# Patient Record
Sex: MALE | Race: WHITE
[De-identification: names, ages, dates, MRNs, and addresses within clinical notes are randomized per-mention and may not be internally consistent; named-entity substitution may affect disease eponyms.]

---

## 2018-04-04 ENCOUNTER — HOSPITAL ENCOUNTER (OUTPATIENT)
Dept: HOSPITAL 89 - RAD | Age: 68
End: 2018-04-04
Attending: FAMILY MEDICINE
Payer: COMMERCIAL

## 2018-04-04 DIAGNOSIS — M47.896: Primary | ICD-10-CM

## 2018-04-04 PROCEDURE — 72100 X-RAY EXAM L-S SPINE 2/3 VWS: CPT

## 2018-04-04 NOTE — RADIOLOGY IMAGING REPORT
FACILITY: [facility] - [place] 

 

PATIENT NAME: Darryl Gannon

: 1950

MR: [id number]

V: [id number]

EXAM DATE: 

ORDERING PHYSICIAN: ISRRAEL DONG

TECHNOLOGIST: 

 

Location: [facility] - [place]

Patient: Darryl Gannon

: 1950

MRN: [id number]

Visit/Account:[id number]

Date of Sevice:  2018

 

ACCESSION #: [id number]

 

Exam type: LUMBAR SPINE 2 OR 3 VIEW

 

History: Low back pain, no known injury

 

Comparison: None.

 

Findings:

 

There are five nonrib-bearing lumbar-type vertebral bodies present.  There is no evidence of acute fr
actures or subluxations within the lumbar spine.  Small anterior osteophytes are seen throughout the 
lumbar spine.  There is suggestion of mild disc space narrowing L1-2 and T12-L1 and L5-S1.  Mild vasc
ular calcination occasions are present in the abdominal aorta.

 

IMPRESSION:

 

1.  Mild spondylotic changes lumbar spine as described.  If patient's symptoms persist MR is recommen
ded

 

Report Dictated By: Dottie Hanna MD at 2018 2:21 PM

 

Report E-Signed By: Dottie Hanna MD  at 2018 2:24 PM

 

WSN:[email or web address]

## 2018-05-01 ENCOUNTER — HOSPITAL ENCOUNTER (OUTPATIENT)
Dept: HOSPITAL 89 - CT | Age: 68
LOS: 2 days | Discharge: HOME | End: 2018-05-03
Attending: UROLOGY
Payer: COMMERCIAL

## 2018-05-01 DIAGNOSIS — M54.5: ICD-10-CM

## 2018-05-01 DIAGNOSIS — R31.0: ICD-10-CM

## 2018-05-01 DIAGNOSIS — N20.0: Primary | ICD-10-CM

## 2018-05-01 DIAGNOSIS — N28.1: ICD-10-CM

## 2018-05-01 LAB — PLATELET COUNT, AUTOMATED: 204 K/UL (ref 150–450)

## 2018-05-01 PROCEDURE — 82247 BILIRUBIN TOTAL: CPT

## 2018-05-01 PROCEDURE — 82248 BILIRUBIN DIRECT: CPT

## 2018-05-01 PROCEDURE — 82947 ASSAY GLUCOSE BLOOD QUANT: CPT

## 2018-05-01 PROCEDURE — 84520 ASSAY OF UREA NITROGEN: CPT

## 2018-05-01 PROCEDURE — 84155 ASSAY OF PROTEIN SERUM: CPT

## 2018-05-01 PROCEDURE — 82565 ASSAY OF CREATININE: CPT

## 2018-05-01 PROCEDURE — 85025 COMPLETE CBC W/AUTO DIFF WBC: CPT

## 2018-05-01 PROCEDURE — 84075 ASSAY ALKALINE PHOSPHATASE: CPT

## 2018-05-01 PROCEDURE — 74178 CT ABD&PLV WO CNTR FLWD CNTR: CPT

## 2018-05-01 PROCEDURE — 82040 ASSAY OF SERUM ALBUMIN: CPT

## 2018-05-01 PROCEDURE — 87088 URINE BACTERIA CULTURE: CPT

## 2018-05-01 PROCEDURE — 84295 ASSAY OF SERUM SODIUM: CPT

## 2018-05-01 PROCEDURE — 81001 URINALYSIS AUTO W/SCOPE: CPT

## 2018-05-01 PROCEDURE — 36415 COLL VENOUS BLD VENIPUNCTURE: CPT

## 2018-05-01 PROCEDURE — 84460 ALANINE AMINO (ALT) (SGPT): CPT

## 2018-05-01 PROCEDURE — 84132 ASSAY OF SERUM POTASSIUM: CPT

## 2018-05-01 PROCEDURE — 82374 ASSAY BLOOD CARBON DIOXIDE: CPT

## 2018-05-01 PROCEDURE — 84450 TRANSFERASE (AST) (SGOT): CPT

## 2018-05-01 PROCEDURE — 82435 ASSAY OF BLOOD CHLORIDE: CPT

## 2018-05-01 PROCEDURE — 82310 ASSAY OF CALCIUM: CPT

## 2018-08-06 ENCOUNTER — HOSPITAL ENCOUNTER (EMERGENCY)
Dept: HOSPITAL 89 - ER | Age: 68
Discharge: HOME | End: 2018-08-06
Payer: COMMERCIAL

## 2018-08-06 VITALS — SYSTOLIC BLOOD PRESSURE: 131 MMHG | DIASTOLIC BLOOD PRESSURE: 77 MMHG

## 2018-08-06 DIAGNOSIS — I25.2: ICD-10-CM

## 2018-08-06 DIAGNOSIS — J44.9: ICD-10-CM

## 2018-08-06 DIAGNOSIS — M16.12: ICD-10-CM

## 2018-08-06 DIAGNOSIS — R11.2: Primary | ICD-10-CM

## 2018-08-06 DIAGNOSIS — I10: ICD-10-CM

## 2018-08-06 DIAGNOSIS — F17.210: ICD-10-CM

## 2018-08-06 DIAGNOSIS — Z79.82: ICD-10-CM

## 2018-08-06 DIAGNOSIS — Z79.899: ICD-10-CM

## 2018-08-06 LAB — PLATELET COUNT, AUTOMATED: 234 K/UL (ref 150–450)

## 2018-08-06 PROCEDURE — 84075 ASSAY ALKALINE PHOSPHATASE: CPT

## 2018-08-06 PROCEDURE — 84155 ASSAY OF PROTEIN SERUM: CPT

## 2018-08-06 PROCEDURE — 82247 BILIRUBIN TOTAL: CPT

## 2018-08-06 PROCEDURE — 96375 TX/PRO/DX INJ NEW DRUG ADDON: CPT

## 2018-08-06 PROCEDURE — 84132 ASSAY OF SERUM POTASSIUM: CPT

## 2018-08-06 PROCEDURE — 84520 ASSAY OF UREA NITROGEN: CPT

## 2018-08-06 PROCEDURE — 96374 THER/PROPH/DIAG INJ IV PUSH: CPT

## 2018-08-06 PROCEDURE — 96376 TX/PRO/DX INJ SAME DRUG ADON: CPT

## 2018-08-06 PROCEDURE — 82435 ASSAY OF BLOOD CHLORIDE: CPT

## 2018-08-06 PROCEDURE — 82947 ASSAY GLUCOSE BLOOD QUANT: CPT

## 2018-08-06 PROCEDURE — 82040 ASSAY OF SERUM ALBUMIN: CPT

## 2018-08-06 PROCEDURE — 82310 ASSAY OF CALCIUM: CPT

## 2018-08-06 PROCEDURE — 82374 ASSAY BLOOD CARBON DIOXIDE: CPT

## 2018-08-06 PROCEDURE — 99284 EMERGENCY DEPT VISIT MOD MDM: CPT

## 2018-08-06 PROCEDURE — 84295 ASSAY OF SERUM SODIUM: CPT

## 2018-08-06 PROCEDURE — 81001 URINALYSIS AUTO W/SCOPE: CPT

## 2018-08-06 PROCEDURE — 84460 ALANINE AMINO (ALT) (SGPT): CPT

## 2018-08-06 PROCEDURE — 84450 TRANSFERASE (AST) (SGOT): CPT

## 2018-08-06 PROCEDURE — 82150 ASSAY OF AMYLASE: CPT

## 2018-08-06 PROCEDURE — 85025 COMPLETE CBC W/AUTO DIFF WBC: CPT

## 2018-08-06 PROCEDURE — 82565 ASSAY OF CREATININE: CPT

## 2018-08-06 NOTE — ER REPORT
History and Physical


Time Seen By MD:  14:46


Hx. of Stated Complaint:  


VOMITTING FOR 6 DAYS.  NOTHING TO EAT FOR 4 DAYS


HPI/ROS


CHIEF COMPLAINT: Nausea and vomiting





HISTORY OF PRESENT ILLNESS: This is a 67-year-old male who presents to the 

emergency department for nausea and vomiting. Patient states that over the last 

6 days he's had no acute onset of nausea and vomiting, unable to keep anything 

down not able to eat over the last 4 days. Patient states he is scheduled to 

have left hip replacement in about 5 weeks. Patient states he is being very 

careful with his medications avoiding medications such as NSAIDs to prevent a 

delay in the surgery. Patient states his primary care provider gave him some 

tramadol for the hip pain. Patient states he is also very opposed to narcotic 

pain medications. Patient denies fevers, chest pain, shortness of breath. He is 

agreeable to a small dose of morphine and the emergency department for his left 

hip pain.





REVIEW OF SYSTEMS:


Respiratory: No cough, no dyspnea.


Cardiovascular: No chest pain, no palpitations.


Gastrointestinal: As above.


Musculoskeletal: As above.


Allergies:  


Coded Allergies:  


     No Known Drug Allergies (Verified , 11/2/17)


Home Meds


Active Scripts


Ondansetron (ZOFRAN ODT) 4 Mg Tab.rapdis, 4 MG PO Q6H Y for NAUSEA/VOMITING, #

20 TAB.SOL 0 Refills


   Prov:AMELIA MURRAY [facility]         8/6/18


Hydrocodone Bit/Acetaminophen (HYDROCODON-ACETAMINOPHEN 5-325) 1 Each Tablet, 1 

EACH PO Q4-6H Y for PAIN, #8 TAB 0 Refills


   Take 1/2 a tab every 6 hours as needed for pain.


   Prov:AMELIA MURRAY [facility]         8/6/18


Reported Medications


Atorvastatin Calcium (LIPITOR) 40 Mg Tablet, 1 TAB PO QHS, TAB


   8/13/17


Aspirin (ASPIR 81) 81 Mg Tablet.dr, 81 MG PO QDAY, TAB


   8/13/17


Amlodipine Besylate (AMLODIPINE BESYLATE) 10 Mg Tablet, 1 TAB PO QDAY, TAB


   8/13/17


Enalapril Maleate (ENALAPRIL MALEATE) 20 Mg Tablet, 20 MG PO QDAY


   6/18/17


Discontinued Reported Medications


Nitroglycerin (NITROGLYCERIN) 0.4 Mg Tab.subl, 0.4 MG SL Q5MIN Y for PAIN


   8/13/17


Albuterol Sulfate (PROVENTIL HFA) 6.7 Gm Inh, 2 PUFF INH Q6H, INH


   8/13/17


Past Medical/Surgical History


The patient has a past medical surgical history of arthritis, hypertension, 

heart attack, COPD, chronic stomachache, celiac disease, lumbar back pain, back 

pain.


Reviewed Nurses Notes:  Yes


Hx Smoking:  Yes (1/2 PPD)


Smoking Status:  Current: Every Day Smoker


Constitutional





Vital Sign - Last 24 Hours








 8/6/18





 14:38


 


Temp 98.1


 


Pulse 78


 


Resp 16


 


B/P (MAP) 134/101


 


Pulse Ox 94


 


O2 Delivery Room Air








Physical Exam


  General Appearance: The patient is alert, has no immediate need for airway 

protection and no current signs of toxicity, cachectic appearing.


Eyes: Pupils equal and round no injection.


Respiratory: Chest is non tender, lungs are clear to auscultation.


Cardiac: regular rate and rhythm, no murmurs, clicks or rubs.


Gastrointestinal: Abdomen is soft mild discomfort to the epigastrium with 

palpation. No masses, bowel sounds normal.


Musculoskeletal:  Neck: Neck is supple and non tender.


   Extremities have full range of motion and are non tender.


Skin: No rashes or lesions.





DIFFERENTIAL DIAGNOSIS: After history and physical exam differential diagnosis 

was considered for nausea and vomiting including but not limited to 

gastroenteritis, gastritis, appendicitis, and medication side effect.





Medical Decision Making


Data Points


Result Diagram:  


8/6/18 1449                                                                    

            8/6/18 1449





Laboratory





Hematology








Test


  8/6/18


14:49 8/6/18


16:26


 


Red Blood Count


  4.21 M/uL


(4.00-5.60) 


 


 


Mean Corpuscular Volume


  101.4 fL


(80.0-96.0) 


 


 


Mean Corpuscular Hemoglobin


  36.0 pg


(26.0-33.0) 


 


 


Mean Corpuscular Hemoglobin


Concent 35.5 g/dL


(32.0-36.0) 


 


 


Red Cell Distribution Width


  13.2 %


(11.5-14.5) 


 


 


Mean Platelet Volume


  7.2 fL


(7.2-11.1) 


 


 


Neutrophils (%) (Auto)


  78.9 %


(39.4-72.5) 


 


 


Lymphocytes (%) (Auto)


  11.9 %


(17.6-49.6) 


 


 


Monocytes (%) (Auto)


  7.7 %


(4.1-12.4) 


 


 


Eosinophils (%) (Auto)


  0.1 %


(0.4-6.7) 


 


 


Basophils (%) (Auto)


  1.4 %


(0.3-1.4) 


 


 


Nucleated RBC Relative Count


(auto) 0.0 /100WBC 


  


 


 


Neutrophils # (Auto)


  7.6 K/uL


(2.0-7.4) 


 


 


Lymphocytes # (Auto)


  1.1 K/uL


(1.3-3.6) 


 


 


Monocytes # (Auto)


  0.7 K/uL


(0.3-1.0) 


 


 


Eosinophils # (Auto)


  0.0 K/uL


(0.0-0.5) 


 


 


Basophils # (Auto)


  0.1 K/uL


(0.0-0.1) 


 


 


Nucleated RBC Absolute Count


(auto) 0.00 K/uL 


  


 


 


Peripheral Blood Smear No Y/N  


 


Sodium Level


  140 mmol/L


(137-145) 


 


 


Potassium Level


  4.0 mmol/L


(3.5-5.0) 


 


 


Chloride Level


  105 mmol/L


() 


 


 


Carbon Dioxide Level


  20 mmol/L


(22-30) 


 


 


Blood Urea Nitrogen


  25 mg/dl


(9-21) 


 


 


Creatinine


  1.30 mg/dl


(0.66-1.25) 


 


 


Glomerular Filtration Rate


Calc 54.9 


  


 


 


Random Glucose


  96 mg/dl


() 


 


 


Calcium Level


  9.0 mg/dl


(8.4-10.2) 


 


 


Total Bilirubin


  0.8 mg/dl


(0.2-1.3) 


 


 


Aspartate Amino Transf


(AST/SGOT) 23 U/L (0-35) 


  


 


 


Alanine Aminotransferase


(ALT/SGPT) 15 U/L (0-56) 


  


 


 


Alkaline Phosphatase 64 U/L (0-126)  


 


Total Protein


  6.4 g/dl


(6.3-8.2) 


 


 


Albumin


  4.0 g/dl


(3.5-5.0) 


 


 


Amylase Level 65 U/L (0-110)  


 


Urine Color  Yellow 


 


Urine Clarity  Clear 


 


Urine pH


  


  5.0 pH


(4.8-9.5)


 


Urine Specific Gravity  1.023 


 


Urine Protein


  


  30 mg/dL


(NEGATIVE)


 


Urine Glucose (UA)


  


  Negative mg/dL


(NEGATIVE)


 


Urine Ketones


  


  80 mg/dL


(NEGATIVE)


 


Urine Blood


  


  Negative


(NEGATIVE)


 


Urine Nitrite


  


  Negative


(NEGATIVE)


 


Urine Bilirubin


  


  Negative


(NEGATIVE)


 


Urine Urobilinogen


  


  2.0 mg/dL


(0.2-1.9)


 


Urine Leukocyte Esterase


  


  Negative


(NEGATIVE)


 


Urine RBC


  


  1 /HPF


(0-2/HPF)


 


Urine WBC


  


  1 /HPF


(0-5/HPF)


 


Urine Squamous Epithelial


Cells 


  None /LPF


(</=FEW)


 


Urine Bacteria


  


  Negative /HPF


(NONE-FEW)


 


Urine Mucus


  


  Few /HPF


(NONE-FEW)








Chemistry








Test


  8/6/18


14:49 8/6/18


16:26


 


White Blood Count


  9.6 k/uL


(4.5-11.0) 


 


 


Red Blood Count


  4.21 M/uL


(4.00-5.60) 


 


 


Hemoglobin


  15.1 g/dL


(14.0-18.0) 


 


 


Hematocrit


  42.7 %


(42.0-52.0) 


 


 


Mean Corpuscular Volume


  101.4 fL


(80.0-96.0) 


 


 


Mean Corpuscular Hemoglobin


  36.0 pg


(26.0-33.0) 


 


 


Mean Corpuscular Hemoglobin


Concent 35.5 g/dL


(32.0-36.0) 


 


 


Red Cell Distribution Width


  13.2 %


(11.5-14.5) 


 


 


Platelet Count


  234 K/uL


(150-450) 


 


 


Mean Platelet Volume


  7.2 fL


(7.2-11.1) 


 


 


Neutrophils (%) (Auto)


  78.9 %


(39.4-72.5) 


 


 


Lymphocytes (%) (Auto)


  11.9 %


(17.6-49.6) 


 


 


Monocytes (%) (Auto)


  7.7 %


(4.1-12.4) 


 


 


Eosinophils (%) (Auto)


  0.1 %


(0.4-6.7) 


 


 


Basophils (%) (Auto)


  1.4 %


(0.3-1.4) 


 


 


Nucleated RBC Relative Count


(auto) 0.0 /100WBC 


  


 


 


Neutrophils # (Auto)


  7.6 K/uL


(2.0-7.4) 


 


 


Lymphocytes # (Auto)


  1.1 K/uL


(1.3-3.6) 


 


 


Monocytes # (Auto)


  0.7 K/uL


(0.3-1.0) 


 


 


Eosinophils # (Auto)


  0.0 K/uL


(0.0-0.5) 


 


 


Basophils # (Auto)


  0.1 K/uL


(0.0-0.1) 


 


 


Nucleated RBC Absolute Count


(auto) 0.00 K/uL 


  


 


 


Peripheral Blood Smear No Y/N  


 


Glomerular Filtration Rate


Calc 54.9 


  


 


 


Calcium Level


  9.0 mg/dl


(8.4-10.2) 


 


 


Total Bilirubin


  0.8 mg/dl


(0.2-1.3) 


 


 


Aspartate Amino Transf


(AST/SGOT) 23 U/L (0-35) 


  


 


 


Alanine Aminotransferase


(ALT/SGPT) 15 U/L (0-56) 


  


 


 


Alkaline Phosphatase 64 U/L (0-126)  


 


Total Protein


  6.4 g/dl


(6.3-8.2) 


 


 


Albumin


  4.0 g/dl


(3.5-5.0) 


 


 


Amylase Level 65 U/L (0-110)  


 


Urine Color  Yellow 


 


Urine Clarity  Clear 


 


Urine pH


  


  5.0 pH


(4.8-9.5)


 


Urine Specific Gravity  1.023 


 


Urine Protein


  


  30 mg/dL


(NEGATIVE)


 


Urine Glucose (UA)


  


  Negative mg/dL


(NEGATIVE)


 


Urine Ketones


  


  80 mg/dL


(NEGATIVE)


 


Urine Blood


  


  Negative


(NEGATIVE)


 


Urine Nitrite


  


  Negative


(NEGATIVE)


 


Urine Bilirubin


  


  Negative


(NEGATIVE)


 


Urine Urobilinogen


  


  2.0 mg/dL


(0.2-1.9)


 


Urine Leukocyte Esterase


  


  Negative


(NEGATIVE)


 


Urine RBC


  


  1 /HPF


(0-2/HPF)


 


Urine WBC


  


  1 /HPF


(0-5/HPF)


 


Urine Squamous Epithelial


Cells 


  None /LPF


(</=FEW)


 


Urine Bacteria


  


  Negative /HPF


(NONE-FEW)


 


Urine Mucus


  


  Few /HPF


(NONE-FEW)








Urinalysis








Test


  8/6/18


16:26


 


Urine Color Yellow 


 


Urine Clarity Clear 


 


Urine pH


  5.0 pH


(4.8-9.5)


 


Urine Specific Gravity 1.023 


 


Urine Protein


  30 mg/dL


(NEGATIVE)


 


Urine Glucose (UA)


  Negative mg/dL


(NEGATIVE)


 


Urine Ketones


  80 mg/dL


(NEGATIVE)


 


Urine Blood


  Negative


(NEGATIVE)


 


Urine Nitrite


  Negative


(NEGATIVE)


 


Urine Bilirubin


  Negative


(NEGATIVE)


 


Urine Urobilinogen


  2.0 mg/dL


(0.2-1.9)


 


Urine Leukocyte Esterase


  Negative


(NEGATIVE)


 


Urine RBC


  1 /HPF


(0-2/HPF)


 


Urine WBC


  1 /HPF


(0-5/HPF)


 


Urine Squamous Epithelial


Cells None /LPF


(</=FEW)


 


Urine Bacteria


  Negative /HPF


(NONE-FEW)


 


Urine Mucus


  Few /HPF


(NONE-FEW)











ED Course/Re-evaluation


Clinical Indication for ER IV:  Hydration, IV Access


ED Course


The patient was admitted to room. A history and physical were obtained. 

Differential diagnoses were considered. IV was started. CBC, CMP and UA were 

obtained. .4, MCH 36.0, I did compare these to the previous studies and 

this is within his normal limits. Chemistry showing BUN 25, creatinine 1.30 

this is similar to previous studies. UA showing ketones and concentrated urine 

otherwise unremarkable. Patient was given a 1 L normal saline bolus. 4 mg IV 

Zofran, 4 mg IV morphine for his hip pain. Patient states he is feeling better 

however still having a little nausea, did repeat a 4 mg IV Zofran, he also 

received 20 mg IV and famotidine. Patient states he is feeling much better and 

would like to go home. I did send a prescription in for her Zofran and 

hydrocodone. I did tell the patient that he can go ahead and take the 

hydrocodone as needed for his discomfort till he follows up with his surgeon. 

The patient expressed understanding and is willing to do this as long as it 

doesn't delay his surgery. There were no other questions or concerns at this 

time and the patient was discharged home. Patient was feeling significantly 

better at the time of discharge.


Decision to Disposition Date:  Aug 6, 2018


Decision to Disposition Time:  16:42





Depart


Departure


Latest Vital Signs





Vital Signs








  Date Time  Temp Pulse Resp B/P (MAP) Pulse Ox O2 Delivery O2 Flow Rate FiO2


 


8/6/18 14:38 98.1 78 16 134/101 94 Room Air  








Impression:  


 Primary Impression:  


 Nausea & vomiting


 Additional Impression:  


 Hip arthritis


Condition:  Improved


Disposition:  HOME OR SELF-CARE


Referrals:  


ISRRAEL DONG MD (PCP)


2 Days


New Scripts


Ondansetron (ZOFRAN ODT) 4 Mg Tab.rapdis


4 MG PO Q6H Y for NAUSEA/VOMITING, #20 TAB.SOL 0 Refills


   Prov: AMELIA MURRAY [facility]         8/6/18 


Hydrocodone Bit/Acetaminophen (HYDROCODON-ACETAMINOPHEN 5-325) 1 Each Tablet


1 EACH PO Q4-6H Y for PAIN, #8 TAB 0 Refills


   Take 1/2 a tab every 6 hours as needed for pain.


   Prov: AMELIA MURRAY [facility]         8/6/18


Patient Instructions:  Acute Nausea and Vomiting (ED), Clear Liquid Diet (ED), 

Narcotic Pain Management (ED)





Additional Instructions:  


Try the Zofran for nausea and vomiting.


Clear liquid diet for the next 12 hours then slowly progress into a regular 

diet.


I did sent a prescription for hydrocodone to your pharmacy which you can use 

for severe pain until you have your surgery.


Take Tylenol as needed if not using hydrocodone, do not take both together.


Follow up with your primary care provider in 2-4 days if no improvement.


Return to the ED for any other concerns or worsening symptoms.





Problem Qualifiers








 Primary Impression:  


 Nausea & vomiting


 Vomiting type:  unspecified  Vomiting Intractability:  non-intractable  

Qualified Codes:  R11.2 - Nausea with vomiting, unspecified








AMELIA MURRAY [facility] Aug 6, 2018 14:50

## 2018-08-30 RX ORDER — TRAZODONE HYDROCHLORIDE 100 MG/1
100 TABLET ORAL NIGHTLY
COMMUNITY
End: 2019-05-16

## 2018-08-30 RX ORDER — ALPRAZOLAM 1 MG/1
1 TABLET ORAL 3 TIMES DAILY PRN
COMMUNITY

## 2018-08-30 RX ORDER — BUDESONIDE AND FORMOTEROL FUMARATE DIHYDRATE 160; 4.5 UG/1; UG/1
2 AEROSOL RESPIRATORY (INHALATION) DAILY
COMMUNITY

## 2018-09-05 ENCOUNTER — APPOINTMENT (OUTPATIENT)
Dept: LAB | Facility: HOSPITAL | Age: 68
End: 2018-09-05

## 2018-09-05 ENCOUNTER — OFFICE VISIT (OUTPATIENT)
Dept: GASTROENTEROLOGY | Facility: CLINIC | Age: 68
End: 2018-09-05

## 2018-09-05 VITALS
TEMPERATURE: 97 F | WEIGHT: 168 LBS | DIASTOLIC BLOOD PRESSURE: 80 MMHG | SYSTOLIC BLOOD PRESSURE: 124 MMHG | HEIGHT: 70 IN | HEART RATE: 100 BPM | BODY MASS INDEX: 24.05 KG/M2 | OXYGEN SATURATION: 96 %

## 2018-09-05 DIAGNOSIS — K74.60 CIRRHOSIS OF LIVER WITHOUT ASCITES, UNSPECIFIED HEPATIC CIRRHOSIS TYPE (HCC): Primary | ICD-10-CM

## 2018-09-05 LAB
ALBUMIN SERPL-MCNC: 4.5 G/DL (ref 3.5–5)
ALBUMIN/GLOB SERPL: 1.1 G/DL (ref 1.1–2.5)
ALP SERPL-CCNC: 257 U/L (ref 24–120)
ALT SERPL W P-5'-P-CCNC: 39 U/L (ref 0–54)
ANION GAP SERPL CALCULATED.3IONS-SCNC: 11 MMOL/L (ref 4–13)
AST SERPL-CCNC: 73 U/L (ref 7–45)
BILIRUB SERPL-MCNC: 1 MG/DL (ref 0.1–1)
BUN BLD-MCNC: 5 MG/DL (ref 5–21)
BUN/CREAT SERPL: 7.9 (ref 7–25)
CALCIUM SPEC-SCNC: 9.8 MG/DL (ref 8.4–10.4)
CHLORIDE SERPL-SCNC: 99 MMOL/L (ref 98–110)
CO2 SERPL-SCNC: 31 MMOL/L (ref 24–31)
CREAT BLD-MCNC: 0.63 MG/DL (ref 0.5–1.4)
DEPRECATED RDW RBC AUTO: 49.7 FL (ref 40–54)
ERYTHROCYTE [DISTWIDTH] IN BLOOD BY AUTOMATED COUNT: 12.9 % (ref 12–15)
GFR SERPL CREATININE-BSD FRML MDRD: 126 ML/MIN/1.73
GLOBULIN UR ELPH-MCNC: 4 GM/DL
GLUCOSE BLD-MCNC: 140 MG/DL (ref 70–100)
HCT VFR BLD AUTO: 45.8 % (ref 40–52)
HGB BLD-MCNC: 15.9 G/DL (ref 14–18)
INR PPP: 1.02 (ref 0.91–1.09)
MCH RBC QN AUTO: 36 PG (ref 28–32)
MCHC RBC AUTO-ENTMCNC: 34.7 G/DL (ref 33–36)
MCV RBC AUTO: 103.6 FL (ref 82–95)
PLATELET # BLD AUTO: 112 10*3/MM3 (ref 130–400)
PMV BLD AUTO: 10.4 FL (ref 6–12)
POTASSIUM BLD-SCNC: 4.2 MMOL/L (ref 3.5–5.3)
PROT SERPL-MCNC: 8.5 G/DL (ref 6.3–8.7)
PROTHROMBIN TIME: 13.7 SECONDS (ref 11.9–14.6)
RBC # BLD AUTO: 4.42 10*6/MM3 (ref 4.8–5.9)
SODIUM BLD-SCNC: 141 MMOL/L (ref 135–145)
WBC NRBC COR # BLD: 11.98 10*3/MM3 (ref 4.8–10.8)

## 2018-09-05 PROCEDURE — 82105 ALPHA-FETOPROTEIN SERUM: CPT | Performed by: NURSE PRACTITIONER

## 2018-09-05 PROCEDURE — 80053 COMPREHEN METABOLIC PANEL: CPT | Performed by: NURSE PRACTITIONER

## 2018-09-05 PROCEDURE — 85610 PROTHROMBIN TIME: CPT | Performed by: NURSE PRACTITIONER

## 2018-09-05 PROCEDURE — 99204 OFFICE O/P NEW MOD 45 MIN: CPT | Performed by: NURSE PRACTITIONER

## 2018-09-05 PROCEDURE — 36415 COLL VENOUS BLD VENIPUNCTURE: CPT | Performed by: NURSE PRACTITIONER

## 2018-09-05 PROCEDURE — 85027 COMPLETE CBC AUTOMATED: CPT | Performed by: NURSE PRACTITIONER

## 2018-09-05 NOTE — PROGRESS NOTES
Chief Complaint   Patient presents with   • Liver Eval     had ct showed spot on liver        Subjective     HPI    Diagnosed with cirrhosis during hospitalization at [facility] Aug 2018.  Pt treated for bladder cancer (found on CT, eval for hematuria).  Repeat CT at [facility] showed  suspicious bowel perforation, cirrhotic liver with 2.1 cm lesion to right hepatic lobe.  He has a significant hx of etoh consumption.  Currently consumes 6-8 beer per day and has drank larger amounts in the past.  Denies heartburn/indigestion/dysphagia.  No abdominal pain.  No changes in bowels.  No BRBPR or melena.  Weight is stable.  No tattoo, no hx of blood transfusion, mother had liver disease.    No previous cscope/egd    Past Medical History:   Diagnosis Date   • Anxiety    • COPD (chronic obstructive pulmonary disease) (CMS/HCC)        History reviewed. No pertinent surgical history.    Outpatient Prescriptions Marked as Taking for the 9/5/18 encounter (Office Visit) with Jarrod Cherry APRN   Medication Sig Dispense Refill   • ALPRAZolam (XANAX) 1 MG tablet Take 1 mg by mouth 2 (Two) Times a Day As Needed for Anxiety.     • budesonide-formoterol (SYMBICORT) 160-4.5 MCG/ACT inhaler Inhale 2 puffs 2 (Two) Times a Day.     • traZODone (DESYREL) 100 MG tablet Take 100 mg by mouth Every Night.         Allergies   Allergen Reactions   • Penicillins Other (See Comments)     unsure       Social History     Social History   • Marital status:      Spouse name: N/A   • Number of children: N/A   • Years of education: N/A     Occupational History   • Not on file.     Social History Main Topics   • Smoking status: Current Every Day Smoker     Packs/day: 1.00     Years: 40.00   • Smokeless tobacco: Never Used   • Alcohol use Yes      Comment: daily    • Drug use: No   • Sexual activity: Not on file     Other Topics Concern   • Not on file     Social History Narrative   • No narrative on file       Family History   Problem  "Relation Age of Onset   • Colon cancer Father        Review of Systems   Constitutional: Negative for fatigue, fever and unexpected weight change.   HENT: Negative for hearing loss, sore throat and voice change.    Eyes: Negative for visual disturbance.   Respiratory: Negative for cough, shortness of breath and wheezing.    Cardiovascular: Negative for chest pain and palpitations.   Gastrointestinal: Negative for abdominal pain, blood in stool and vomiting.   Endocrine: Negative for polydipsia and polyuria.   Genitourinary: Negative for difficulty urinating, dysuria, hematuria and urgency.   Musculoskeletal: Negative for joint swelling and myalgias.   Skin: Negative for color change, rash and wound.   Neurological: Negative for dizziness, tremors, seizures and syncope.   Hematological: Does not bruise/bleed easily.   Psychiatric/Behavioral: Negative for agitation and confusion. The patient is not nervous/anxious.        Objective     Vitals:    09/05/18 1028   BP: 124/80   Pulse: 100   Temp: 97 °F (36.1 °C)   SpO2: 96%   Weight: 76.2 kg (168 lb)   Height: 177.8 cm (70\")     Body mass index is 24.11 kg/m².    Physical Exam   Constitutional: He is oriented to person, place, and time. He appears well-developed and well-nourished. He is cooperative.   HENT:   Head: Normocephalic and atraumatic.   Eyes: Pupils are equal, round, and reactive to light. Conjunctivae are normal. No scleral icterus.   Neck: Normal range of motion. Neck supple. No JVD present. No thyroid mass and no thyromegaly present.   Cardiovascular: Normal rate, regular rhythm and normal heart sounds.  Exam reveals no gallop and no friction rub.    No murmur heard.  Pulmonary/Chest: Effort normal and breath sounds normal. No accessory muscle usage. No respiratory distress. He has no wheezes. He has no rales.   Abdominal: Soft. Normal appearance and bowel sounds are normal. He exhibits no distension, no ascites and no mass. There is no hepatosplenomegaly. " There is no tenderness. There is no rebound and no guarding.   Musculoskeletal: Normal range of motion. He exhibits no edema or tenderness.     Vascular Status -  His right foot exhibits normal foot vasculature  and no edema. His left foot exhibits normal foot vasculature  and no edema.  Lymphadenopathy:     He has no cervical adenopathy.   Neurological: He is alert and oriented to person, place, and time. He has normal strength. Gait normal.   Skin: Skin is warm, dry and intact. No rash noted.       Imaging Results (most recent)     None          Body mass index is 24.11 kg/m².    Assessment/Plan     Elder was seen today for liver eval.    Diagnoses and all orders for this visit:    Cirrhosis of liver without ascites, unspecified hepatic cirrhosis type (CMS/HCC)  -     MRI Abdomen With & Without Contrast; Future  -     AFP Tumor Marker  -     Comprehensive Metabolic Panel  -     Protime-INR  -     CBC (No Diff)  -     Case Request; Standing  -     Follow Anesthesia Guidelines / Standing Orders; Future  -     Implement Anesthesia Orders Day of Procedure; Standing  -     Obtain Informed Consent; Standing  -     Case Request        ESOPHAGOGASTRODUODENOSCOPY WITH ANESTHESIA (N/A)    Case discussed with Dr Barahona, agreement with treatment plan  CT triple phase v MRI discussed with Dr LIBAN Varela, Radiology  MRI prior to EGD, Dr Barahona will review result at time of EGD  EGD to r/o varices  1500 mg Na diet, avoid etoh  Encouraged daily weight, call office with greater than 10 lb gain  Explained concern for HCC, cirrhosis places pt at risk for HCC  Recommend cscope, not until verify perforation healed  Will request notes from [place]  Further recommendation pending result of ordered testing    The risk of the endoscopy were discussed in detail.  We discussed the risk of perforation (one out of [id number], riskier with dilation), bleeding (one out of 500), and the rare risks of infection, adverse reaction to anesthesia,  respiratory failure, cardiac failure including MI and adverse reaction to medications, etc.  We discussed consequences that could occur if a risk were to develop such as the need for hospitalization, blood transfusion, surgical intervention, medications, pain and disability and death.  Alternatives include not doing anything, or pursuing an UGI series which only offers a diagnosis with potential less accuracy compared to egd.  The patient verbalizes understanding and agrees to proceed.        There are no Patient Instructions on file for this visit.

## 2018-09-06 PROBLEM — K74.60 CIRRHOSIS OF LIVER WITHOUT ASCITES (HCC): Status: ACTIVE | Noted: 2018-09-06

## 2018-09-06 LAB — AFP-TM SERPL-MCNC: 13.1 NG/ML (ref 0–8.3)

## 2018-09-10 ENCOUNTER — TELEPHONE (OUTPATIENT)
Dept: GASTROENTEROLOGY | Facility: CLINIC | Age: 68
End: 2018-09-10

## 2018-09-10 ENCOUNTER — OFFICE VISIT (OUTPATIENT)
Dept: UROLOGY | Facility: CLINIC | Age: 68
End: 2018-09-10

## 2018-09-10 VITALS — BODY MASS INDEX: 24.34 KG/M2 | WEIGHT: 170 LBS | TEMPERATURE: 98.7 F | HEIGHT: 70 IN

## 2018-09-10 DIAGNOSIS — C67.9 MALIGNANT NEOPLASM OF URINARY BLADDER, UNSPECIFIED SITE (HCC): Primary | ICD-10-CM

## 2018-09-10 LAB
BILIRUB BLD-MCNC: NEGATIVE MG/DL
CLARITY, POC: CLEAR
COLOR UR: YELLOW
GLUCOSE UR STRIP-MCNC: NEGATIVE MG/DL
KETONES UR QL: NEGATIVE
LEUKOCYTE EST, POC: ABNORMAL
NITRITE UR-MCNC: NEGATIVE MG/ML
PH UR: 5.5 [PH] (ref 5–8)
PROT UR STRIP-MCNC: NEGATIVE MG/DL
RBC # UR STRIP: ABNORMAL /UL
SP GR UR: 1.01 (ref 1–1.03)
UROBILINOGEN UR QL: NORMAL

## 2018-09-10 PROCEDURE — 99203 OFFICE O/P NEW LOW 30 MIN: CPT | Performed by: UROLOGY

## 2018-09-10 PROCEDURE — 81001 URINALYSIS AUTO W/SCOPE: CPT | Performed by: UROLOGY

## 2018-09-10 NOTE — PROGRESS NOTES
Mr. Mina is 68 y.o. male    Chief Complaint   Patient presents with   • Blood in Urine       Blood in Urine   This is a new problem. The current episode started 1 to 4 weeks ago. The problem has been resolved since onset. He describes the hematuria as gross hematuria. He is experiencing no pain. He describes his urine color as dark red. Irritative symptoms do not include frequency or urgency. Pertinent negatives include no abdominal pain, chills, dysuria, facial swelling, fever, flank pain, nausea or vomiting. His past medical history is significant for tobacco use.       The following portions of the patient's history were reviewed and updated as appropriate: allergies, current medications, past family history, past medical history, past social history, past surgical history and problem list.    Review of Systems   Constitutional: Negative for appetite change, chills, fever and unexpected weight change.   HENT: Negative for congestion, ear pain, facial swelling, hearing loss, nosebleeds, trouble swallowing and voice change.    Eyes: Negative for photophobia, pain, discharge and visual disturbance.   Respiratory: Negative for cough, choking, chest tightness and shortness of breath.    Cardiovascular: Negative for chest pain and palpitations.   Gastrointestinal: Negative for abdominal distention, abdominal pain, blood in stool, constipation, diarrhea, nausea and vomiting.   Endocrine: Negative for cold intolerance, heat intolerance and polydipsia.   Genitourinary: Negative for decreased urine volume, difficulty urinating, discharge, dysuria, enuresis, flank pain, frequency, genital sores, hematuria, penile pain, penile swelling, scrotal swelling, testicular pain and urgency.   Musculoskeletal: Negative for arthralgias, joint swelling, neck pain and neck stiffness.   Skin: Negative for pallor and rash.   Allergic/Immunologic: Negative for immunocompromised state.   Neurological: Negative for dizziness, tremors,  "seizures, syncope, light-headedness and headaches.   Hematological: Negative for adenopathy. Does not bruise/bleed easily.   Psychiatric/Behavioral: Negative for agitation, confusion, dysphoric mood, hallucinations, self-injury and suicidal ideas.         Current Outpatient Prescriptions:   •  ALPRAZolam (XANAX) 1 MG tablet, Take 1 mg by mouth 2 (Two) Times a Day As Needed for Anxiety., Disp: , Rfl:   •  budesonide-formoterol (SYMBICORT) 160-4.5 MCG/ACT inhaler, Inhale 2 puffs 2 (Two) Times a Day., Disp: , Rfl:   •  traZODone (DESYREL) 100 MG tablet, Take 100 mg by mouth Every Night., Disp: , Rfl:     Past Medical History:   Diagnosis Date   • Anxiety    • Cancer (CMS/HCC)     bladder - karla dr trotter   • COPD (chronic obstructive pulmonary disease) (CMS/HCC)        History reviewed. No pertinent surgical history.    Social History     Social History   • Marital status:      Social History Main Topics   • Smoking status: Current Every Day Smoker     Packs/day: 1.00     Years: 40.00   • Smokeless tobacco: Never Used   • Alcohol use Yes      Comment: daily    • Drug use: No   • Sexual activity: Defer     Other Topics Concern   • Not on file       Family History   Problem Relation Age of Onset   • Colon cancer Father        Objective    Temp 98.7 °F (37.1 °C)   Ht 177.8 cm (70\")   Wt 77.1 kg (170 lb)   BMI 24.39 kg/m²     Physical Exam  Constitutional: Well nourished, Well developed; No apparent distress  Psychiatric: Appropriate affect; Alert and oriented  Eyes: Unremarkable  Musculoskeletal: Normal gait and station  GI: Abdomen is soft, non-tender  Respiratory: No distress; Unlabored movement; No accessory musculature needed with symmetric movements  Skin: No pallor or diaphoresis  ; Penis and testicles are normal; Prostate 40 mL without nodule        Office Visit on 09/05/2018   Component Date Value Ref Range Status   • AFP Tumor Marker 09/05/2018 13.1* 0.0 - 8.3 ng/mL Final    Roche ECLIA methodology "   • Glucose 09/05/2018 140* 70 - 100 mg/dL Final   • BUN 09/05/2018 5  5 - 21 mg/dL Final   • Creatinine 09/05/2018 0.63  0.50 - 1.40 mg/dL Final   • Sodium 09/05/2018 141  135 - 145 mmol/L Final   • Potassium 09/05/2018 4.2  3.5 - 5.3 mmol/L Final   • Chloride 09/05/2018 99  98 - 110 mmol/L Final   • CO2 09/05/2018 31.0  24.0 - 31.0 mmol/L Final   • Calcium 09/05/2018 9.8  8.4 - 10.4 mg/dL Final   • Total Protein 09/05/2018 8.5  6.3 - 8.7 g/dL Final   • Albumin 09/05/2018 4.50  3.50 - 5.00 g/dL Final   • ALT (SGPT) 09/05/2018 39  0 - 54 U/L Final   • AST (SGOT) 09/05/2018 73* 7 - 45 U/L Final   • Alkaline Phosphatase 09/05/2018 257* 24 - 120 U/L Final   • Total Bilirubin 09/05/2018 1.0  0.1 - 1.0 mg/dL Final   • eGFR Non African Amer 09/05/2018 126  >60 mL/min/1.73 Final   • Globulin 09/05/2018 4.0  gm/dL Final   • A/G Ratio 09/05/2018 1.1  1.1 - 2.5 g/dL Final   • BUN/Creatinine Ratio 09/05/2018 7.9  7.0 - 25.0 Final   • Anion Gap 09/05/2018 11.0  4.0 - 13.0 mmol/L Final   • Protime 09/05/2018 13.7  11.9 - 14.6 Seconds Final   • INR 09/05/2018 1.02  0.91 - 1.09 Final   • WBC 09/05/2018 11.98* 4.80 - 10.80 10*3/mm3 Final   • RBC 09/05/2018 4.42* 4.80 - 5.90 10*6/mm3 Final   • Hemoglobin 09/05/2018 15.9  14.0 - 18.0 g/dL Final   • Hematocrit 09/05/2018 45.8  40.0 - 52.0 % Final   • MCV 09/05/2018 103.6* 82.0 - 95.0 fL Final   • MCH 09/05/2018 36.0* 28.0 - 32.0 pg Final   • MCHC 09/05/2018 34.7  33.0 - 36.0 g/dL Final   • RDW 09/05/2018 12.9  12.0 - 15.0 % Final   • RDW-SD 09/05/2018 49.7  40.0 - 54.0 fl Final   • MPV 09/05/2018 10.4  6.0 - 12.0 fL Final   • Platelets 09/05/2018 112* 130 - 400 10*3/mm3 Final       Results for orders placed or performed in visit on 09/10/18   POC Urinalysis Dipstick, Multipro   Result Value Ref Range    Color Yellow Yellow, Straw, Dark Yellow, Gabrielle    Clarity, UA Clear Clear    Glucose, UA Negative Negative, 1000 mg/dL (3+) mg/dL    Bilirubin Negative Negative    Ketones, UA Negative  Negative    Specific Gravity  1.010 1.005 - 1.030    Blood, UA Trace (A) Negative    pH, Urine 5.5 5.0 - 8.0    Protein, POC Negative Negative mg/dL    Urobilinogen, UA Normal Normal    Nitrite, UA Negative Negative    Leukocytes Small (1+) (A) Negative     Assessment and Plan    Elder was seen today for blood in urine.    Diagnoses and all orders for this visit:    Malignant neoplasm of urinary bladder, unspecified site (CMS/HCC)  -     POC Urinalysis Dipstick, Multipro    I reviewed the records that I received from his primary care physician.  It does sound like at the beginning of August he had an episode of gross hematuria and was having abdominal pain was seen in the emergency room at [facility].  At that point he was transferred to [place] due to the finding of a bladder mass as well as free air.  Unfortunately I do not have any of the records from [place].  Per patient, he underwent a exploratory laparoscopy and they found a mass which was removed but I do not have any pathology results from this and the patient has not received pathology from this.  He did undergo a transurethral resection of tumor per patient and was told that this was a cancer.  Unfortunately again he does not have the pathology for this.    At this point, I am unable to make any clinical decisions on his care moving forward without the records from [place] to ascertain what was done for him laparoscopically as well as the pathology from this, as and most importantly from my standpoint the pathology from the bladder.  I did discuss with him that superficial cancer is typically treated with repeat cystoscopy.  High-grade or T1 disease is typically treated with BCG, but I would be hesitant to start this on him because he does have cirrhosis of the liver and is high-definition immunocompromised.    I will see him back next week with his records from [place] have asked him #release today.

## 2018-09-10 NOTE — TELEPHONE ENCOUNTER
Alk phos level from [place] reviewed   Resulted 9/1/18    Alk phos - 255 (h)    Fractionated: liver - 71, Bone - 27, intestinal 2 (all WNL)

## 2018-09-12 ENCOUNTER — HOSPITAL ENCOUNTER (OUTPATIENT)
Dept: MRI IMAGING | Facility: HOSPITAL | Age: 68
Discharge: HOME | End: 2018-09-12

## 2018-09-12 DIAGNOSIS — K74.60 CIRRHOSIS OF LIVER WITHOUT ASCITES, UNSPECIFIED HEPATIC CIRRHOSIS TYPE (HCC): ICD-10-CM

## 2018-09-14 ENCOUNTER — HOSPITAL ENCOUNTER (OUTPATIENT)
Dept: MRI IMAGING | Facility: HOSPITAL | Age: 68
Discharge: HOME | End: 2018-09-14
Admitting: NURSE PRACTITIONER

## 2018-09-14 PROCEDURE — 74183 MRI ABD W/O CNTR FLWD CNTR: CPT

## 2018-09-14 PROCEDURE — A9577 INJ MULTIHANCE: HCPCS | Performed by: NURSE PRACTITIONER

## 2018-09-14 PROCEDURE — 0 GADOBENATE DIMEGLUMINE 529 MG/ML SOLUTION: Performed by: NURSE PRACTITIONER

## 2018-09-14 RX ADMIN — GADOBENATE DIMEGLUMINE 10 ML: 529 INJECTION, SOLUTION INTRAVENOUS at 09:23

## 2018-09-16 ENCOUNTER — TELEPHONE (OUTPATIENT)
Dept: GASTROENTEROLOGY | Facility: CLINIC | Age: 68
End: 2018-09-16

## 2018-09-16 NOTE — TELEPHONE ENCOUNTER
Ct from 8/13/18 - [place]    Multiple foci of extraluminal shanice posterior to cecum  Abdominopelvic free fluid with large cetnrally calcified structure in pouch of Cyrus.  Potentially r/t possible bowel perf, however, more than likely this is suspected to more likely be chronic and of indeterminate etiology.  A clear urinary or biliary source is not identified  Borderline cirrhotic hepatic configuration, probable mild varices adjacent to distal esophagus and prox stomach.  Non specific fat stranding in upper abdominal mesentery and retroperitoneum.  Mild gastrohepatic adenopathy, uncertain etiology possibly reactive; neoplastic etiology cannot be excluded.      Physicians concerned he had a perforated colon cancer v diverticulitis.    Urology pathology:  Left bladder wall - noninvasive high grade papillary urothelial carcinoma  Right bladder wall -  Noninvasive high grade papillary urothelial carcinoma with extensive cautery artifact    I will place records to be scanned to chart

## 2018-09-18 ENCOUNTER — OFFICE VISIT (OUTPATIENT)
Dept: UROLOGY | Facility: CLINIC | Age: 68
End: 2018-09-18

## 2018-09-18 VITALS — HEIGHT: 70 IN | WEIGHT: 170 LBS | BODY MASS INDEX: 24.34 KG/M2 | TEMPERATURE: 98.7 F

## 2018-09-18 DIAGNOSIS — C67.9 MALIGNANT NEOPLASM OF URINARY BLADDER, UNSPECIFIED SITE (HCC): Primary | ICD-10-CM

## 2018-09-18 LAB
BILIRUB BLD-MCNC: NEGATIVE MG/DL
CLARITY, POC: CLEAR
COLOR UR: YELLOW
GLUCOSE UR STRIP-MCNC: NEGATIVE MG/DL
KETONES UR QL: NEGATIVE
LEUKOCYTE EST, POC: ABNORMAL
NITRITE UR-MCNC: NEGATIVE MG/ML
PH UR: 5 [PH] (ref 5–8)
PROT UR STRIP-MCNC: NEGATIVE MG/DL
RBC # UR STRIP: ABNORMAL /UL
SP GR UR: 1 (ref 1–1.03)
UROBILINOGEN UR QL: NORMAL

## 2018-09-18 PROCEDURE — 99213 OFFICE O/P EST LOW 20 MIN: CPT | Performed by: UROLOGY

## 2018-09-18 PROCEDURE — 81001 URINALYSIS AUTO W/SCOPE: CPT | Performed by: UROLOGY

## 2018-09-18 NOTE — PROGRESS NOTES
Mr. Mina is 68 y.o. 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 This is a new diagnois diagnosis.. The patient was initially diagnosed several week(s) ago. Severity is best is explained as HG no muscle. Previous management includes TURBT. Symptoms include no hematuria, dysuria or flank pain.  Last upper tract imaging was CT urogram done approximately  1  month(s) ago.             The following portions of the patient's history were reviewed and updated as appropriate: allergies, current medications, past family history, past medical history, past social history, past surgical history and problem list.    Review of Systems   Constitutional: Negative for chills and fever.   Gastrointestinal: Negative for abdominal pain, anal bleeding and blood in stool.   Genitourinary: Negative for decreased urine volume, difficulty urinating, discharge, dysuria, enuresis, flank pain, frequency, genital sores, hematuria, penile pain, penile swelling, scrotal swelling, testicular pain and urgency.         Current Outpatient Prescriptions:   •  ALPRAZolam (XANAX) 1 MG tablet, Take 1 mg by mouth 2 (Two) Times a Day As Needed for Anxiety., Disp: , Rfl:   •  budesonide-formoterol (SYMBICORT) 160-4.5 MCG/ACT inhaler, Inhale 2 puffs 2 (Two) Times a Day., Disp: , Rfl:   •  traZODone (DESYREL) 100 MG tablet, Take 100 mg by mouth Every Night., Disp: , Rfl:     Past Medical History:   Diagnosis Date   • Anxiety    • Cancer (CMS/[facility])     bladder - karla trotter   • COPD (chronic obstructive pulmonary disease) (CMS/[facility])        History reviewed. No pertinent surgical history.    Social History     Social History   • Marital status:      Social History Main Topics   • Smoking status: Current Every Day Smoker     Packs/day: 1.00     Years: 40.00   • Smokeless tobacco: Never Used   • Alcohol use Yes     "  Comment: daily    • Drug use: No   • Sexual activity: Defer     Other Topics Concern   • Not on file       Family History   Problem Relation Age of Onset   • Colon cancer Father        Objective    Temp 98.7 °F (37.1 °C)   Ht 177.8 cm (70\")   Wt 77.1 kg (170 lb)   BMI 24.39 kg/m²     Physical Exam    Office Visit on 09/10/2018   Component Date Value Ref Range Status   • Color 09/10/2018 Yellow  Yellow, Straw, Dark Yellow, Gabrielle Final   • Clarity, UA 09/10/2018 Clear  Clear Final   • Glucose, UA 09/10/2018 Negative  Negative, 1000 mg/dL (3+) mg/dL Final   • Bilirubin 09/10/2018 Negative  Negative Final   • Ketones, UA 09/10/2018 Negative  Negative Final   • Specific Gravity  09/10/2018 1.010  1.005 - 1.030 Final   • Blood, UA 09/10/2018 Trace* Negative Final   • pH, Urine 09/10/2018 5.5  5.0 - 8.0 Final   • Protein, POC 09/10/2018 Negative  Negative mg/dL Final   • Urobilinogen, UA 09/10/2018 Normal  Normal Final   • Nitrite, UA 09/10/2018 Negative  Negative Final   • Leukocytes 09/10/2018 Small (1+)* Negative Final       Results for orders placed or performed in visit on 09/18/18   POC Urinalysis Dipstick, Multipro   Result Value Ref Range    Color Yellow Yellow, Straw, Dark Yellow, Gabrielle    Clarity, UA Clear Clear    Glucose, UA Negative Negative, 1000 mg/dL (3+) mg/dL    Bilirubin Negative Negative    Ketones, UA Negative Negative    Specific Gravity  1.005 1.005 - 1.030    Blood, UA Trace (A) Negative    pH, Urine 5.0 5.0 - 8.0    Protein, POC Negative Negative mg/dL    Urobilinogen, UA Normal Normal    Nitrite, UA Negative Negative    Leukocytes Trace (A) Negative     Assessment and Plan    Elder was seen today for bladder cancer.    Diagnoses and all orders for this visit:    Malignant neoplasm of urinary bladder, unspecified site (CMS/HCC)  -     POC Urinalysis Dipstick, Multipro    I was able to review the records from [place].  In terms of the general surgery findings, I will defer to his general " surgeon for further discussion of these.  His urologic findings were high grade disease, with no muscle in the final specimen.    I did discuss with Mr. Mina that high-grade disease with no muscle in the specimen needs a repeat resection to determine the depth of invasion.  I did also discuss with him the outcomes of this reresection.  I discussed with him that T2 disease typically need cystectomy or chemotherapy and radiation, and that T1 disease or even high grade superficial disease often requires a BCG treatments.  With that in mind, I discussed with him that his cirrhosis of the liver puts him in an immunocompromised state and I would be hesitant to start him on BCG.  Because of this, I discussed with him that any of the pathology results for resection if done here I would like for him to seek a second opinion at [facility] to see if he qualifies for further intervention with either cystectomy or BCG.  I would however be happy to do his reresection here in order to save him an extra trip to [place].  We discussed the risks and benefits of a resection and this would include bleeding, infection, damage to the urethra with urethral stricture, postoperative pain, bladder perforation requiring catheterization or open surgical repair.    At this point he is unsure if he would like to have the reresection done at our facility or [facility] and he will contact me further in the future.  I will make an appointment to see me in a few weeks so that he is not lost to follow-up.    Approximately 15 minutes was spent with the patient with greater than 50% spent counseling

## 2018-09-20 ENCOUNTER — ANESTHESIA EVENT (OUTPATIENT)
Dept: GASTROENTEROLOGY | Facility: HOSPITAL | Age: 68
End: 2018-09-20

## 2018-09-20 ENCOUNTER — ANESTHESIA (OUTPATIENT)
Dept: GASTROENTEROLOGY | Facility: HOSPITAL | Age: 68
End: 2018-09-20

## 2018-09-20 ENCOUNTER — HOSPITAL ENCOUNTER (OUTPATIENT)
Facility: HOSPITAL | Age: 68
Setting detail: HOSPITAL OUTPATIENT SURGERY
Discharge: HOME | End: 2018-09-20
Attending: INTERNAL MEDICINE | Admitting: INTERNAL MEDICINE

## 2018-09-20 VITALS
RESPIRATION RATE: 19 BRPM | WEIGHT: 161 LBS | SYSTOLIC BLOOD PRESSURE: 123 MMHG | DIASTOLIC BLOOD PRESSURE: 59 MMHG | BODY MASS INDEX: 23.05 KG/M2 | HEART RATE: 102 BPM | TEMPERATURE: 97.2 F | OXYGEN SATURATION: 96 % | HEIGHT: 70 IN

## 2018-09-20 DIAGNOSIS — K74.60 CIRRHOSIS OF LIVER WITHOUT ASCITES, UNSPECIFIED HEPATIC CIRRHOSIS TYPE (HCC): ICD-10-CM

## 2018-09-20 PROCEDURE — 43239 EGD BIOPSY SINGLE/MULTIPLE: CPT | Performed by: INTERNAL MEDICINE

## 2018-09-20 PROCEDURE — 25010000002 PROPOFOL 10 MG/ML EMULSION: Performed by: NURSE ANESTHETIST, CERTIFIED REGISTERED

## 2018-09-20 PROCEDURE — 87081 CULTURE SCREEN ONLY: CPT | Performed by: INTERNAL MEDICINE

## 2018-09-20 RX ORDER — LIDOCAINE HYDROCHLORIDE 20 MG/ML
INJECTION, SOLUTION INFILTRATION; PERINEURAL AS NEEDED
Status: DISCONTINUED | OUTPATIENT
Start: 2018-09-20 | End: 2018-09-20 | Stop reason: SURG

## 2018-09-20 RX ORDER — SODIUM CHLORIDE 9 MG/ML
500 INJECTION, SOLUTION INTRAVENOUS CONTINUOUS PRN
Status: DISCONTINUED | OUTPATIENT
Start: 2018-09-20 | End: 2018-09-20 | Stop reason: HOSPADM

## 2018-09-20 RX ORDER — SODIUM CHLORIDE 0.9 % (FLUSH) 0.9 %
3 SYRINGE (ML) INJECTION AS NEEDED
Status: DISCONTINUED | OUTPATIENT
Start: 2018-09-20 | End: 2018-09-20 | Stop reason: HOSPADM

## 2018-09-20 RX ORDER — PROPOFOL 10 MG/ML
VIAL (ML) INTRAVENOUS AS NEEDED
Status: DISCONTINUED | OUTPATIENT
Start: 2018-09-20 | End: 2018-09-20 | Stop reason: SURG

## 2018-09-20 RX ADMIN — SODIUM CHLORIDE 500 ML: 9 INJECTION, SOLUTION INTRAVENOUS at 08:25

## 2018-09-20 RX ADMIN — PROPOFOL 50 MG: 10 INJECTION, EMULSION INTRAVENOUS at 09:20

## 2018-09-20 RX ADMIN — LIDOCAINE HYDROCHLORIDE 100 MG: 20 INJECTION, SOLUTION INFILTRATION; PERINEURAL at 09:20

## 2018-09-20 RX ADMIN — LIDOCAINE HYDROCHLORIDE 0.5 ML: 10 INJECTION, SOLUTION EPIDURAL; INFILTRATION; INTRACAUDAL; PERINEURAL at 08:25

## 2018-09-20 RX ADMIN — PROPOFOL 50 MG: 10 INJECTION, EMULSION INTRAVENOUS at 09:23

## 2018-09-20 NOTE — H&P (VIEW-ONLY)
Chief Complaint   Patient presents with   • Liver Eval     had ct showed spot on liver        Subjective     HPI    Diagnosed with cirrhosis during hospitalization at [place] Aug 2018.  Pt treated for bladder cancer (found on CT, eval for hematuria).  Repeat CT at [place] showed  suspicious bowel perforation, cirrhotic liver with 2.1 cm lesion to right hepatic lobe.  He has a significant hx of etoh consumption.  Currently consumes 6-8 beer per day and has drank larger amounts in the past.  Denies heartburn/indigestion/dysphagia.  No abdominal pain.  No changes in bowels.  No BRBPR or melena.  Weight is stable.  No tattoo, no hx of blood transfusion, mother had liver disease.    No previous cscope/egd    Past Medical History:   Diagnosis Date   • Anxiety    • COPD (chronic obstructive pulmonary disease) (CMS/HCC)        History reviewed. No pertinent surgical history.    Outpatient Prescriptions Marked as Taking for the 9/5/18 encounter (Office Visit) with Jarrod Cherry APRN   Medication Sig Dispense Refill   • ALPRAZolam (XANAX) 1 MG tablet Take 1 mg by mouth 2 (Two) Times a Day As Needed for Anxiety.     • budesonide-formoterol (SYMBICORT) 160-4.5 MCG/ACT inhaler Inhale 2 puffs 2 (Two) Times a Day.     • traZODone (DESYREL) 100 MG tablet Take 100 mg by mouth Every Night.         Allergies   Allergen Reactions   • Penicillins Other (See Comments)     unsure       Social History     Social History   • Marital status:      Spouse name: N/A   • Number of children: N/A   • Years of education: N/A     Occupational History   • Not on file.     Social History Main Topics   • Smoking status: Current Every Day Smoker     Packs/day: 1.00     Years: 40.00   • Smokeless tobacco: Never Used   • Alcohol use Yes      Comment: daily    • Drug use: No   • Sexual activity: Not on file     Other Topics Concern   • Not on file     Social History Narrative   • No narrative on file       Family History   Problem  "Relation Age of Onset   • Colon cancer Father        Review of Systems   Constitutional: Negative for fatigue, fever and unexpected weight change.   HENT: Negative for hearing loss, sore throat and voice change.    Eyes: Negative for visual disturbance.   Respiratory: Negative for cough, shortness of breath and wheezing.    Cardiovascular: Negative for chest pain and palpitations.   Gastrointestinal: Negative for abdominal pain, blood in stool and vomiting.   Endocrine: Negative for polydipsia and polyuria.   Genitourinary: Negative for difficulty urinating, dysuria, hematuria and urgency.   Musculoskeletal: Negative for joint swelling and myalgias.   Skin: Negative for color change, rash and wound.   Neurological: Negative for dizziness, tremors, seizures and syncope.   Hematological: Does not bruise/bleed easily.   Psychiatric/Behavioral: Negative for agitation and confusion. The patient is not nervous/anxious.        Objective     Vitals:    09/05/18 1028   BP: 124/80   Pulse: 100   Temp: 97 °F (36.1 °C)   SpO2: 96%   Weight: 76.2 kg (168 lb)   Height: 177.8 cm (70\")     Body mass index is 24.11 kg/m².    Physical Exam   Constitutional: He is oriented to person, place, and time. He appears well-developed and well-nourished. He is cooperative.   HENT:   Head: Normocephalic and atraumatic.   Eyes: Pupils are equal, round, and reactive to light. Conjunctivae are normal. No scleral icterus.   Neck: Normal range of motion. Neck supple. No JVD present. No thyroid mass and no thyromegaly present.   Cardiovascular: Normal rate, regular rhythm and normal heart sounds.  Exam reveals no gallop and no friction rub.    No murmur heard.  Pulmonary/Chest: Effort normal and breath sounds normal. No accessory muscle usage. No respiratory distress. He has no wheezes. He has no rales.   Abdominal: Soft. Normal appearance and bowel sounds are normal. He exhibits no distension, no ascites and no mass. There is no hepatosplenomegaly. " There is no tenderness. There is no rebound and no guarding.   Musculoskeletal: Normal range of motion. He exhibits no edema or tenderness.     Vascular Status -  His right foot exhibits normal foot vasculature  and no edema. His left foot exhibits normal foot vasculature  and no edema.  Lymphadenopathy:     He has no cervical adenopathy.   Neurological: He is alert and oriented to person, place, and time. He has normal strength. Gait normal.   Skin: Skin is warm, dry and intact. No rash noted.       Imaging Results (most recent)     None          Body mass index is 24.11 kg/m².    Assessment/Plan     Elder was seen today for liver eval.    Diagnoses and all orders for this visit:    Cirrhosis of liver without ascites, unspecified hepatic cirrhosis type (CMS/HCC)  -     MRI Abdomen With & Without Contrast; Future  -     AFP Tumor Marker  -     Comprehensive Metabolic Panel  -     Protime-INR  -     CBC (No Diff)  -     Case Request; Standing  -     Follow Anesthesia Guidelines / Standing Orders; Future  -     Implement Anesthesia Orders Day of Procedure; Standing  -     Obtain Informed Consent; Standing  -     Case Request        ESOPHAGOGASTRODUODENOSCOPY WITH ANESTHESIA (N/A)    Case discussed with Dr Barahona, agreement with treatment plan  CT triple phase v MRI discussed with Dr LIBAN Varela, Radiology  MRI prior to EGD, Dr Barahona will review result at time of EGD  EGD to r/o varices  1500 mg Na diet, avoid etoh  Encouraged daily weight, call office with greater than 10 lb gain  Explained concern for HCC, cirrhosis places pt at risk for HCC  Recommend cscope, not until verify perforation healed  Will request notes from [facility]  Further recommendation pending result of ordered testing    The risk of the endoscopy were discussed in detail.  We discussed the risk of perforation (one out of [id number], riskier with dilation), bleeding (one out of 500), and the rare risks of infection, adverse reaction to anesthesia,  respiratory failure, cardiac failure including MI and adverse reaction to medications, etc.  We discussed consequences that could occur if a risk were to develop such as the need for hospitalization, blood transfusion, surgical intervention, medications, pain and disability and death.  Alternatives include not doing anything, or pursuing an UGI series which only offers a diagnosis with potential less accuracy compared to egd.  The patient verbalizes understanding and agrees to proceed.        There are no Patient Instructions on file for this visit.

## 2018-09-20 NOTE — ANESTHESIA PREPROCEDURE EVALUATION
Anesthesia Evaluation     Patient summary reviewed   no history of anesthetic complications:  NPO Solid Status: > 8 hours             Airway   Mallampati: II  TM distance: >3 FB  Neck ROM: full  Dental    (+) edentulous    Pulmonary    (+) a smoker, COPD,   Cardiovascular - negative cardio ROS  Exercise tolerance: good (4-7 METS)        Neuro/Psych- negative ROS  GI/Hepatic/Renal/Endo    (+)   liver disease (cirrhosis),     Musculoskeletal     Abdominal    Substance History      OB/GYN          Other      history of cancer (bladder)                    Anesthesia Plan    ASA 3     general     intravenous induction   Anesthetic plan, all risks, benefits, and alternatives have been provided, discussed and informed consent has been obtained with: patient.

## 2018-09-20 NOTE — ANESTHESIA POSTPROCEDURE EVALUATION
Patient: Elder Mina    Procedure Summary     Date:  09/20/18 Room / Location:  [facility] ENDOSCOPY 6 / [facility]    Anesthesia Start:  0915 Anesthesia Stop:  0926    Procedure:  ESOPHAGOGASTRODUODENOSCOPY WITH ANESTHESIA (N/A ) Diagnosis:       Cirrhosis of liver without ascites, unspecified hepatic cirrhosis type (CMS/HCC)      (Cirrhosis of liver without ascites, unspecified hepatic cirrhosis type (CMS/HCC) [K74.60])    Surgeon:  Sanford Barahona DO Provider:  Abby Lockett CRNA    Anesthesia Type:  general ASA Status:  3          Anesthesia Type: general  Last vitals  BP   142/66 (09/20/18 0808)   Temp   97.2 °F (36.2 °C) (09/20/18 0808)   Pulse   104 (09/20/18 0808)   Resp   20 (09/20/18 0808)     SpO2   93 % (recheck) (09/20/18 0813)     Post Anesthesia Care and Evaluation    Patient location during evaluation: PHASE II  Patient participation: complete - patient participated  Level of consciousness: awake and alert  Pain score: 0  Pain management: adequate  Airway patency: patent  Anesthetic complications: No anesthetic complications    Cardiovascular status: acceptable and stable  Respiratory status: acceptable and unassisted  Hydration status: stable

## 2018-09-21 LAB — UREASE TISS QL: NEGATIVE

## 2018-09-24 ENCOUNTER — TELEPHONE (OUTPATIENT)
Dept: UROLOGY | Facility: CLINIC | Age: 68
End: 2018-09-24

## 2018-09-24 NOTE — TELEPHONE ENCOUNTER
Patient was seen 9/18/19 and he has been deciding where to have surgery. He has decided he wants Dr Giang to do it here at [facility]. He wants to know if he needs to keep the follow up 10/2/18 or can we just go ahead and set up surgery?

## 2018-09-25 ENCOUNTER — PREP FOR SURGERY (OUTPATIENT)
Dept: OTHER | Facility: HOSPITAL | Age: 68
End: 2018-09-25

## 2018-09-25 DIAGNOSIS — C67.9 MALIGNANT NEOPLASM OF URINARY BLADDER, UNSPECIFIED SITE (HCC): Primary | ICD-10-CM

## 2018-09-25 RX ORDER — SODIUM CHLORIDE 0.9 % (FLUSH) 0.9 %
1-10 SYRINGE (ML) INJECTION AS NEEDED
Status: CANCELLED | OUTPATIENT
Start: 2018-09-25

## 2018-09-25 RX ORDER — LEVOFLOXACIN 5 MG/ML
500 INJECTION, SOLUTION INTRAVENOUS ONCE
Status: CANCELLED | OUTPATIENT
Start: 2018-09-25 | End: 2018-09-25

## 2018-09-26 ENCOUNTER — APPOINTMENT (OUTPATIENT)
Dept: PREADMISSION TESTING | Facility: HOSPITAL | Age: 68
End: 2018-09-26

## 2018-09-26 VITALS
HEIGHT: 70 IN | BODY MASS INDEX: 23.77 KG/M2 | HEART RATE: 95 BPM | OXYGEN SATURATION: 89 % | RESPIRATION RATE: 20 BRPM | WEIGHT: 166.01 LBS | SYSTOLIC BLOOD PRESSURE: 148 MMHG | DIASTOLIC BLOOD PRESSURE: 74 MMHG

## 2018-09-26 LAB
ALBUMIN SERPL-MCNC: 4.2 G/DL (ref 3.5–5)
ALBUMIN/GLOB SERPL: 1.1 G/DL (ref 1.1–2.5)
ALP SERPL-CCNC: 200 U/L (ref 24–120)
ALT SERPL W P-5'-P-CCNC: 56 U/L (ref 0–54)
ANION GAP SERPL CALCULATED.3IONS-SCNC: 11 MMOL/L (ref 4–13)
AST SERPL-CCNC: 80 U/L (ref 7–45)
BILIRUB SERPL-MCNC: 0.9 MG/DL (ref 0.1–1)
BUN BLD-MCNC: 11 MG/DL (ref 5–21)
BUN/CREAT SERPL: 16.7 (ref 7–25)
CALCIUM SPEC-SCNC: 9.8 MG/DL (ref 8.4–10.4)
CHLORIDE SERPL-SCNC: 99 MMOL/L (ref 98–110)
CO2 SERPL-SCNC: 30 MMOL/L (ref 24–31)
CREAT BLD-MCNC: 0.66 MG/DL (ref 0.5–1.4)
DEPRECATED RDW RBC AUTO: 50.4 FL (ref 40–54)
ERYTHROCYTE [DISTWIDTH] IN BLOOD BY AUTOMATED COUNT: 13 % (ref 12–15)
GFR SERPL CREATININE-BSD FRML MDRD: 120 ML/MIN/1.73
GLOBULIN UR ELPH-MCNC: 3.7 GM/DL
GLUCOSE BLD-MCNC: 102 MG/DL (ref 70–100)
HCT VFR BLD AUTO: 44.2 % (ref 40–52)
HGB BLD-MCNC: 15.3 G/DL (ref 14–18)
MCH RBC QN AUTO: 35.8 PG (ref 28–32)
MCHC RBC AUTO-ENTMCNC: 34.6 G/DL (ref 33–36)
MCV RBC AUTO: 103.5 FL (ref 82–95)
PLATELET # BLD AUTO: 113 10*3/MM3 (ref 130–400)
PMV BLD AUTO: 10.9 FL (ref 6–12)
POTASSIUM BLD-SCNC: 4.1 MMOL/L (ref 3.5–5.3)
PROT SERPL-MCNC: 7.9 G/DL (ref 6.3–8.7)
RBC # BLD AUTO: 4.27 10*6/MM3 (ref 4.8–5.9)
SODIUM BLD-SCNC: 140 MMOL/L (ref 135–145)
WBC NRBC COR # BLD: 9.41 10*3/MM3 (ref 4.8–10.8)

## 2018-09-26 PROCEDURE — 93010 ELECTROCARDIOGRAM REPORT: CPT | Performed by: INTERNAL MEDICINE

## 2018-09-26 PROCEDURE — 36415 COLL VENOUS BLD VENIPUNCTURE: CPT

## 2018-09-26 PROCEDURE — 93005 ELECTROCARDIOGRAM TRACING: CPT

## 2018-09-26 PROCEDURE — 80053 COMPREHEN METABOLIC PANEL: CPT | Performed by: UROLOGY

## 2018-09-26 PROCEDURE — 85027 COMPLETE CBC AUTOMATED: CPT | Performed by: UROLOGY

## 2018-09-28 ENCOUNTER — HOSPITAL ENCOUNTER (OUTPATIENT)
Facility: HOSPITAL | Age: 68
Setting detail: HOSPITAL OUTPATIENT SURGERY
Discharge: HOME | End: 2018-09-28
Attending: UROLOGY | Admitting: UROLOGY

## 2018-09-28 ENCOUNTER — ANESTHESIA EVENT (OUTPATIENT)
Dept: PERIOP | Facility: HOSPITAL | Age: 68
End: 2018-09-28

## 2018-09-28 ENCOUNTER — ANESTHESIA (OUTPATIENT)
Dept: PERIOP | Facility: HOSPITAL | Age: 68
End: 2018-09-28

## 2018-09-28 VITALS
TEMPERATURE: 97.2 F | DIASTOLIC BLOOD PRESSURE: 54 MMHG | HEART RATE: 82 BPM | RESPIRATION RATE: 16 BRPM | OXYGEN SATURATION: 95 % | SYSTOLIC BLOOD PRESSURE: 122 MMHG

## 2018-09-28 DIAGNOSIS — C67.9 MALIGNANT NEOPLASM OF URINARY BLADDER, UNSPECIFIED SITE (HCC): ICD-10-CM

## 2018-09-28 PROCEDURE — 88307 TISSUE EXAM BY PATHOLOGIST: CPT | Performed by: UROLOGY

## 2018-09-28 PROCEDURE — 25010000002 FENTANYL CITRATE (PF) 100 MCG/2ML SOLUTION: Performed by: NURSE ANESTHETIST, CERTIFIED REGISTERED

## 2018-09-28 PROCEDURE — 25010000002 MIDAZOLAM PER 1 MG: Performed by: ANESTHESIOLOGY

## 2018-09-28 PROCEDURE — 25010000002 PROPOFOL 10 MG/ML EMULSION: Performed by: NURSE ANESTHETIST, CERTIFIED REGISTERED

## 2018-09-28 PROCEDURE — 52235 CYSTOSCOPY AND TREATMENT: CPT | Performed by: UROLOGY

## 2018-09-28 PROCEDURE — 25010000002 LEVOFLOXACIN PER 250 MG: Performed by: UROLOGY

## 2018-09-28 PROCEDURE — 88342 IMHCHEM/IMCYTCHM 1ST ANTB: CPT | Performed by: UROLOGY

## 2018-09-28 RX ORDER — FAMOTIDINE 10 MG/ML
20 INJECTION, SOLUTION INTRAVENOUS
Status: DISCONTINUED | OUTPATIENT
Start: 2018-09-28 | End: 2018-09-28 | Stop reason: HOSPADM

## 2018-09-28 RX ORDER — MIDAZOLAM HYDROCHLORIDE 1 MG/ML
2 INJECTION INTRAMUSCULAR; INTRAVENOUS
Status: DISCONTINUED | OUTPATIENT
Start: 2018-09-28 | End: 2018-09-28 | Stop reason: HOSPADM

## 2018-09-28 RX ORDER — ONDANSETRON 2 MG/ML
4 INJECTION INTRAMUSCULAR; INTRAVENOUS ONCE AS NEEDED
Status: DISCONTINUED | OUTPATIENT
Start: 2018-09-28 | End: 2018-09-28 | Stop reason: HOSPADM

## 2018-09-28 RX ORDER — FENTANYL CITRATE 50 UG/ML
INJECTION, SOLUTION INTRAMUSCULAR; INTRAVENOUS AS NEEDED
Status: DISCONTINUED | OUTPATIENT
Start: 2018-09-28 | End: 2018-09-28 | Stop reason: SURG

## 2018-09-28 RX ORDER — SODIUM CHLORIDE 0.9 % (FLUSH) 0.9 %
1-10 SYRINGE (ML) INJECTION AS NEEDED
Status: DISCONTINUED | OUTPATIENT
Start: 2018-09-28 | End: 2018-09-28 | Stop reason: HOSPADM

## 2018-09-28 RX ORDER — ACETAMINOPHEN 500 MG
1000 TABLET ORAL ONCE
Status: COMPLETED | OUTPATIENT
Start: 2018-09-28 | End: 2018-09-28

## 2018-09-28 RX ORDER — PROPOFOL 10 MG/ML
VIAL (ML) INTRAVENOUS AS NEEDED
Status: DISCONTINUED | OUTPATIENT
Start: 2018-09-28 | End: 2018-09-28 | Stop reason: SURG

## 2018-09-28 RX ORDER — SORBITOL 30 G/1000ML
IRRIGANT IRRIGATION AS NEEDED
Status: DISCONTINUED | OUTPATIENT
Start: 2018-09-28 | End: 2018-09-28 | Stop reason: HOSPADM

## 2018-09-28 RX ORDER — OXYCODONE AND ACETAMINOPHEN 7.5; 325 MG/1; MG/1
2 TABLET ORAL ONCE AS NEEDED
Status: DISCONTINUED | OUTPATIENT
Start: 2018-09-28 | End: 2018-09-28 | Stop reason: HOSPADM

## 2018-09-28 RX ORDER — MIDAZOLAM HYDROCHLORIDE 1 MG/ML
1 INJECTION INTRAMUSCULAR; INTRAVENOUS
Status: DISCONTINUED | OUTPATIENT
Start: 2018-09-28 | End: 2018-09-28 | Stop reason: HOSPADM

## 2018-09-28 RX ORDER — SODIUM CHLORIDE 0.9 % (FLUSH) 0.9 %
3 SYRINGE (ML) INJECTION AS NEEDED
Status: DISCONTINUED | OUTPATIENT
Start: 2018-09-28 | End: 2018-09-28 | Stop reason: HOSPADM

## 2018-09-28 RX ORDER — SODIUM CHLORIDE, SODIUM LACTATE, POTASSIUM CHLORIDE, CALCIUM CHLORIDE 600; 310; 30; 20 MG/100ML; MG/100ML; MG/100ML; MG/100ML
1000 INJECTION, SOLUTION INTRAVENOUS CONTINUOUS
Status: DISCONTINUED | OUTPATIENT
Start: 2018-09-28 | End: 2018-09-28 | Stop reason: HOSPADM

## 2018-09-28 RX ORDER — HYDROCODONE BITARTRATE AND ACETAMINOPHEN 5; 325 MG/1; MG/1
1 TABLET ORAL EVERY 6 HOURS PRN
Qty: 12 TABLET | Refills: 0 | Status: SHIPPED | OUTPATIENT
Start: 2018-09-28 | End: 2019-01-16

## 2018-09-28 RX ORDER — METOCLOPRAMIDE HYDROCHLORIDE 5 MG/ML
5 INJECTION INTRAMUSCULAR; INTRAVENOUS
Status: DISCONTINUED | OUTPATIENT
Start: 2018-09-28 | End: 2018-09-28 | Stop reason: HOSPADM

## 2018-09-28 RX ORDER — MEPERIDINE HYDROCHLORIDE 25 MG/ML
12.5 INJECTION INTRAMUSCULAR; INTRAVENOUS; SUBCUTANEOUS
Status: DISCONTINUED | OUTPATIENT
Start: 2018-09-28 | End: 2018-09-28 | Stop reason: HOSPADM

## 2018-09-28 RX ORDER — NALOXONE HCL 0.4 MG/ML
0.4 VIAL (ML) INJECTION AS NEEDED
Status: DISCONTINUED | OUTPATIENT
Start: 2018-09-28 | End: 2018-09-28 | Stop reason: HOSPADM

## 2018-09-28 RX ORDER — LIDOCAINE HYDROCHLORIDE 20 MG/ML
INJECTION, SOLUTION INFILTRATION; PERINEURAL AS NEEDED
Status: DISCONTINUED | OUTPATIENT
Start: 2018-09-28 | End: 2018-09-28 | Stop reason: SURG

## 2018-09-28 RX ORDER — OXYCODONE HYDROCHLORIDE AND ACETAMINOPHEN 5; 325 MG/1; MG/1
1 TABLET ORAL ONCE AS NEEDED
Status: DISCONTINUED | OUTPATIENT
Start: 2018-09-28 | End: 2018-09-28 | Stop reason: HOSPADM

## 2018-09-28 RX ORDER — LEVOFLOXACIN 5 MG/ML
500 INJECTION, SOLUTION INTRAVENOUS ONCE
Status: COMPLETED | OUTPATIENT
Start: 2018-09-28 | End: 2018-09-28

## 2018-09-28 RX ORDER — LABETALOL HYDROCHLORIDE 5 MG/ML
5 INJECTION, SOLUTION INTRAVENOUS
Status: DISCONTINUED | OUTPATIENT
Start: 2018-09-28 | End: 2018-09-28 | Stop reason: HOSPADM

## 2018-09-28 RX ORDER — SODIUM CHLORIDE, SODIUM LACTATE, POTASSIUM CHLORIDE, CALCIUM CHLORIDE 600; 310; 30; 20 MG/100ML; MG/100ML; MG/100ML; MG/100ML
100 INJECTION, SOLUTION INTRAVENOUS CONTINUOUS
Status: DISCONTINUED | OUTPATIENT
Start: 2018-09-28 | End: 2018-09-28 | Stop reason: HOSPADM

## 2018-09-28 RX ORDER — FENTANYL CITRATE 50 UG/ML
25 INJECTION, SOLUTION INTRAMUSCULAR; INTRAVENOUS AS NEEDED
Status: DISCONTINUED | OUTPATIENT
Start: 2018-09-28 | End: 2018-09-28 | Stop reason: HOSPADM

## 2018-09-28 RX ORDER — OXYCODONE AND ACETAMINOPHEN 10; 325 MG/1; MG/1
1 TABLET ORAL ONCE AS NEEDED
Status: COMPLETED | OUTPATIENT
Start: 2018-09-28 | End: 2018-09-28

## 2018-09-28 RX ORDER — MAGNESIUM HYDROXIDE 1200 MG/15ML
LIQUID ORAL AS NEEDED
Status: DISCONTINUED | OUTPATIENT
Start: 2018-09-28 | End: 2018-09-28 | Stop reason: HOSPADM

## 2018-09-28 RX ORDER — IBUPROFEN 600 MG/1
600 TABLET ORAL ONCE AS NEEDED
Status: DISCONTINUED | OUTPATIENT
Start: 2018-09-28 | End: 2018-09-28 | Stop reason: HOSPADM

## 2018-09-28 RX ORDER — IPRATROPIUM BROMIDE AND ALBUTEROL SULFATE 2.5; .5 MG/3ML; MG/3ML
3 SOLUTION RESPIRATORY (INHALATION) ONCE
Status: COMPLETED | OUTPATIENT
Start: 2018-09-28 | End: 2018-09-28

## 2018-09-28 RX ORDER — SULFAMETHOXAZOLE AND TRIMETHOPRIM 800; 160 MG/1; MG/1
1 TABLET ORAL 2 TIMES DAILY
Qty: 6 TABLET | Refills: 0 | Status: SHIPPED | OUTPATIENT
Start: 2018-09-28 | End: 2019-01-09

## 2018-09-28 RX ORDER — IPRATROPIUM BROMIDE AND ALBUTEROL SULFATE 2.5; .5 MG/3ML; MG/3ML
3 SOLUTION RESPIRATORY (INHALATION) ONCE AS NEEDED
Status: DISCONTINUED | OUTPATIENT
Start: 2018-09-28 | End: 2018-09-28 | Stop reason: HOSPADM

## 2018-09-28 RX ADMIN — LEVOFLOXACIN 500 MG: 5 INJECTION, SOLUTION INTRAVENOUS at 13:28

## 2018-09-28 RX ADMIN — SODIUM CHLORIDE, POTASSIUM CHLORIDE, SODIUM LACTATE AND CALCIUM CHLORIDE 1000 ML: 600; 310; 30; 20 INJECTION, SOLUTION INTRAVENOUS at 11:50

## 2018-09-28 RX ADMIN — PROPOFOL 150 MG: 10 INJECTION, EMULSION INTRAVENOUS at 14:30

## 2018-09-28 RX ADMIN — ACETAMINOPHEN 1000 MG: 500 TABLET, FILM COATED ORAL at 13:57

## 2018-09-28 RX ADMIN — OXYCODONE HYDROCHLORIDE AND ACETAMINOPHEN 1 TABLET: 10; 325 TABLET ORAL at 15:16

## 2018-09-28 RX ADMIN — LIDOCAINE HYDROCHLORIDE 0.5 ML: 10 INJECTION, SOLUTION EPIDURAL; INFILTRATION; INTRACAUDAL; PERINEURAL at 11:50

## 2018-09-28 RX ADMIN — IPRATROPIUM BROMIDE AND ALBUTEROL SULFATE 3 ML: 2.5; .5 SOLUTION RESPIRATORY (INHALATION) at 13:57

## 2018-09-28 RX ADMIN — FENTANYL CITRATE 100 MCG: 50 INJECTION, SOLUTION INTRAMUSCULAR; INTRAVENOUS at 14:30

## 2018-09-28 RX ADMIN — MIDAZOLAM HYDROCHLORIDE 2 MG: 1 INJECTION, SOLUTION INTRAMUSCULAR; INTRAVENOUS at 13:57

## 2018-09-28 RX ADMIN — FAMOTIDINE 20 MG: 10 INJECTION, SOLUTION INTRAVENOUS at 13:56

## 2018-09-28 RX ADMIN — LIDOCAINE HYDROCHLORIDE 80 MG: 20 INJECTION, SOLUTION INFILTRATION; PERINEURAL at 14:30

## 2018-09-28 NOTE — ANESTHESIA PROCEDURE NOTES
Airway  Urgency: elective    Airway not difficult    General Information and Staff    Patient location during procedure: OR  CRNA: ELANA ROWE    Indications and Patient Condition  Indications for airway management: airway protection    Preoxygenated: yes  MILS maintained throughout  Mask difficulty assessment: 1 - vent by mask    Final Airway Details  Final airway type: supraglottic airway      Successful airway: I-gel  Size 5    Number of attempts at approach: 1

## 2018-09-28 NOTE — ANESTHESIA PREPROCEDURE EVALUATION
Anesthesia Evaluation     Patient summary reviewed and Nursing notes reviewed   no history of anesthetic complications:  NPO Solid Status: > 8 hours  NPO Liquid Status: > 8 hours           Airway   Mallampati: I  TM distance: >3 FB  Neck ROM: full  No difficulty expected  Dental    (+) edentulous    Pulmonary     breath sounds clear to auscultation  (+) a smoker Current, COPD,   Cardiovascular   Exercise tolerance: poor (<4 METS)    ECG reviewed  Rhythm: regular  Rate: normal        Neuro/Psych  (+) psychiatric history Anxiety,     (-) seizures, TIA, CVA  GI/Hepatic/Renal/Endo    (+)   liver disease,   (-) no renal disease, diabetes    Musculoskeletal     Abdominal    Substance History   (+) alcohol use (now 3-5 beers/day, prior to a couple weeks ago was 9 daily),      OB/GYN          Other      history of cancer (bladder cancer) active                    Anesthesia Plan    ASA 3     general     intravenous induction   Anesthetic plan, all risks, benefits, and alternatives have been provided, discussed and informed consent has been obtained with: patient.

## 2018-09-28 NOTE — ANESTHESIA POSTPROCEDURE EVALUATION
Patient: Elder Mina    Procedure Summary     Date:  09/28/18 Room / Location:   [facility]  [facility]    Anesthesia Start:  1426 Anesthesia Stop:  1500    Procedure:  CYSTOSCOPY TRANSURETHRAL RESECTION OF BLADDER TUMOR (N/A Bladder) Diagnosis:       Malignant neoplasm of urinary bladder, unspecified site (CMS/HCC)      (Malignant neoplasm of urinary bladder, unspecified site (CMS/HCC) [C67.9])    Surgeon:  Paul Giang MD Provider:  Adam Hemphill CRNA    Anesthesia Type:  general ASA Status:  3          Anesthesia Type: general  Last vitals  BP   122/54 (09/28/18 1600)   Temp   97.2 °F (36.2 °C) (09/28/18 1523)   Pulse   82 (09/28/18 1600)   Resp   16 (09/28/18 1600)     SpO2   95 % (09/28/18 1600)     Post Anesthesia Care and Evaluation    Patient location during evaluation: PACU  Patient participation: complete - patient participated  Level of consciousness: awake and alert  Pain management: adequate  Airway patency: patent  Anesthetic complications: No anesthetic complications  PONV Status: none  Cardiovascular status: acceptable and hemodynamically stable  Respiratory status: acceptable  Hydration status: acceptable    Comments: Blood pressure 122/54, pulse 82, temperature 97.2 °F (36.2 °C), temperature source Temporal Artery , resp. rate 16, SpO2 95 %.    Patient discharged from PACU based upon Arielle score. Please see RN notes for further details

## 2018-10-02 LAB
CYTO UR: NORMAL
LAB AP CASE REPORT: NORMAL
LAB AP SYNOPTIC CHECKLIST: NORMAL
PATH REPORT.FINAL DX SPEC: NORMAL
PATH REPORT.GROSS SPEC: NORMAL

## 2018-10-03 ENCOUNTER — TELEPHONE (OUTPATIENT)
Dept: GASTROENTEROLOGY | Facility: CLINIC | Age: 68
End: 2018-10-03

## 2018-10-03 DIAGNOSIS — R16.0 MASS OF RIGHT LOBE OF LIVER: Primary | ICD-10-CM

## 2018-10-12 ENCOUNTER — OFFICE VISIT (OUTPATIENT)
Dept: UROLOGY | Facility: CLINIC | Age: 68
End: 2018-10-12

## 2018-10-12 VITALS — BODY MASS INDEX: 23.77 KG/M2 | WEIGHT: 166 LBS | TEMPERATURE: 98.7 F | HEIGHT: 70 IN

## 2018-10-12 DIAGNOSIS — C67.9 MALIGNANT NEOPLASM OF URINARY BLADDER, UNSPECIFIED SITE (HCC): Primary | ICD-10-CM

## 2018-10-12 LAB
BILIRUB BLD-MCNC: NEGATIVE MG/DL
CLARITY, POC: CLEAR
COLOR UR: YELLOW
GLUCOSE UR STRIP-MCNC: NEGATIVE MG/DL
KETONES UR QL: NEGATIVE
LEUKOCYTE EST, POC: ABNORMAL
NITRITE UR-MCNC: NEGATIVE MG/ML
PH UR: 7 [PH] (ref 5–8)
PROT UR STRIP-MCNC: NEGATIVE MG/DL
RBC # UR STRIP: ABNORMAL /UL
SP GR UR: 1.02 (ref 1–1.03)
UROBILINOGEN UR QL: ABNORMAL

## 2018-10-12 PROCEDURE — 81001 URINALYSIS AUTO W/SCOPE: CPT | Performed by: UROLOGY

## 2018-10-12 PROCEDURE — 99213 OFFICE O/P EST LOW 20 MIN: CPT | Performed by: UROLOGY

## 2018-10-12 NOTE — PROGRESS NOTES
Mr. Mina is 68 y.o. 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 This is a Previously diagnosed diagnosis.. The patient was initially diagnosed several week(s) ago. Severity is best is explained as high grade superficial disease. Previous management includes TURBT. Symptoms include no hematuria, dysuria or flank pain.  Last upper tract imaging was CT urogram done approximately  several  week(s) ago.             The following portions of the patient's history were reviewed and updated as appropriate: allergies, current medications, past family history, past medical history, past social history, past surgical history and problem list.    Review of Systems   Constitutional: Negative for chills and fever.   Gastrointestinal: Negative for abdominal pain, anal bleeding and blood in stool.   Genitourinary: Negative for decreased urine volume, difficulty urinating, discharge, dysuria, enuresis, flank pain, frequency, genital sores, hematuria, penile pain, penile swelling, scrotal swelling, testicular pain and urgency.         Current Outpatient Prescriptions:   •  ALPRAZolam (XANAX) 1 MG tablet, Take 1 mg by mouth 2 (Two) Times a Day As Needed for Anxiety., Disp: , Rfl:   •  budesonide-formoterol (SYMBICORT) 160-4.5 MCG/ACT inhaler, Inhale 2 puffs 2 (Two) Times a Day., Disp: , Rfl:   •  HYDROcodone-acetaminophen (NORCO) 5-325 MG per tablet, Take 1 tablet by mouth Every 6 (Six) Hours As Needed for Moderate Pain ., Disp: 12 tablet, Rfl: 0  •  ipratropium-albuterol (COMBIVENT RESPIMAT)  MCG/ACT inhaler, Inhale 1 puff 4 (Four) Times a Day As Needed for Wheezing., Disp: , Rfl:   •  sulfamethoxazole-trimethoprim (BACTRIM DS) 800-160 MG per tablet, Take 1 tablet by mouth 2 (Two) Times a Day., Disp: 6 tablet, Rfl: 0  •  traZODone (DESYREL) 100 MG tablet, Take 100 mg by mouth Every Night.,  "Disp: , Rfl:     Past Medical History:   Diagnosis Date   • Anxiety    • Cancer (CMS/HCC)     bladder - kar[facility] dr trotter   • Cirrhosis (CMS/HCC)    • COPD (chronic obstructive pulmonary disease) (CMS/HCC)    • Hematuria        Past Surgical History:   Procedure Laterality Date   • CYSTOSCOPY     • ENDOSCOPY N/A 9/20/2018    Procedure: ESOPHAGOGASTRODUODENOSCOPY WITH ANESTHESIA;  Surgeon: Sanford Barahona DO;  Location: [facility] ENDOSCOPY;  Service: Gastroenterology   • EXPLORATORY LAPAROTOMY     • TRANSURETHRAL RESECTION OF BLADDER TUMOR N/A 9/28/2018    Procedure: CYSTOSCOPY TRANSURETHRAL RESECTION OF BLADDER TUMOR;  Surgeon: Paul Giang MD;  Location: [facility] OR;  Service: Urology       Social History     Social History   • Marital status:      Social History Main Topics   • Smoking status: Current Every Day Smoker     Packs/day: 1.00     Years: 40.00   • Smokeless tobacco: Never Used   • Alcohol use Yes      Comment: 1/2 case beer daily, cutting back to  5 beers a day   • Drug use: No   • Sexual activity: Defer     Other Topics Concern   • Not on file       Family History   Problem Relation Age of Onset   • Colon cancer Father        Objective    Temp 98.7 °F (37.1 °C)   Ht 177 cm (69.69\")   Wt 75.3 kg (166 lb)   BMI 24.03 kg/m²     Physical Exam    Admission on 09/28/2018, Discharged on 09/28/2018   Component Date Value Ref Range Status   • Case Report 09/28/2018    Final                    Value:Surgical Pathology Report                         Case: [id number]                                  Authorizing Provider:  Paul Giang MD    Collected:           09/28/2018 02:48 PM          [facility]  Received:            10/01/2018 07:57 AM          Pathologist:           Yomaira Beltran MD                                                          Specimens:   1) - Urinary Bladder, BLADDER TUMOR LATERAL                                                    "      2) - Urinary Bladder, BLADDER TUMOR POSTERIOR                                                       3) - Urinary Bladder, BLADDER TUMOR RIGHT LATERAL BLADDER WALL                            • Final Diagnosis 09/28/2018    Final                    Value:This result contains rich text formatting which cannot be displayed here.   • Synoptic Checklist 09/28/2018    Final                    Value:URINARY BLADDER: Biopsy and Transurethral Resection of Bladder Tumor (TURBT)  (Bladder Bx - All Specimens)                                                                                    SPECIMEN                               Procedure:    TURBT                                                         TUMOR                               Tumor Site:    Posterior wall                                Tumor Site:    Lateral                                Histologic Type:    Papillary urothelial carcinoma, noninvasive                                Histologic Grade:    High-grade                                Tumor Extent:                                     Tumor Extension:    Noninvasive papillary carcinoma                                Accessory Findings:                                     Muscularis Propria Presence:    Muscularis propria (detrusor muscle) present                                  Lymphovascular Invasion:    Not identified      • Gross Description 09/28/2018    Final                    Value:This result contains rich text formatting which cannot be displayed here.   • Microscopic Description 09/28/2018    Final                    Value:This result contains rich text formatting which cannot be displayed here.       Results for orders placed or performed in visit on 10/12/18   POC Urinalysis Dipstick, Multipro   Result Value Ref Range    Color Yellow Yellow, Straw, Dark Yellow, Gabrielle    Clarity, UA Clear Clear    Glucose, UA Negative Negative, 1000 mg/dL (3+) mg/dL    Bilirubin Negative Negative    Ketones,  UA Negative Negative    Specific Gravity  1.020 1.005 - 1.030    Blood, UA Small (A) Negative    pH, Urine 7.0 5.0 - 8.0    Protein, POC Negative Negative mg/dL    Urobilinogen, UA 1 E.U./dL  (A) Normal    Nitrite, UA Negative Negative    Leukocytes Moderate (2+) (A) Negative     Assessment and Plan    Elder was seen today for bladder cancer.    Diagnoses and all orders for this visit:    Malignant neoplasm of urinary bladder, unspecified site (CMS/HCC)  -     POC Urinalysis Dipstick, Multipro    Today we discussed pathology.  High-grade TA disease.  I discussed with him that typically in the presence of a high grade superficial tumor I would offer BCG.  However, he does have cirrhosis of the liver and is at baseline immunocompromised due to his poor health and I am somewhat concerned of this.  We discussed this and he is interested in hearing is second opinion at [facility] where he was initially diagnosed of whether or not he is a candidate for BCG.  Patient is scheduled to see them on October 23.  He will call me in the future if he needs to see me back to further manage this.

## 2018-10-15 ENCOUNTER — TELEPHONE (OUTPATIENT)
Dept: GASTROENTEROLOGY | Facility: CLINIC | Age: 68
End: 2018-10-15

## 2018-10-24 ENCOUNTER — TELEPHONE (OUTPATIENT)
Dept: GASTROENTEROLOGY | Facility: CLINIC | Age: 68
End: 2018-10-24

## 2018-10-24 NOTE — TELEPHONE ENCOUNTER
Pt evaluated by Dr Ninfa Hernandez at [place]    Note from 10/23/18 reviewed    Not candidate for liver transplant related to ongoing tx for bladder cancer and ongoing tobacco and etoh use  Recommend to wean off Xanax

## 2018-10-25 ENCOUNTER — TELEPHONE (OUTPATIENT)
Dept: UROLOGY | Facility: CLINIC | Age: 68
End: 2018-10-25

## 2018-10-25 NOTE — TELEPHONE ENCOUNTER
Called and spoke with nurse from Dr. Velasquez's office in [facility]. She clarified that the patient saw Dr. Velasquez's PA and it was explained that Dr. Velasquez nor his PA treat for bladder cancer and they would refer him to another doctor in the practice that treats for bladder cancer, a urology oncologist. This visit with that doctor has to happen and say it is okay for patient to get BCG based on the patient's medical history before Dr. Giang will treat his bladder cancer with BCG here in [place]. Pt's wife voiced understanding.

## 2018-10-25 NOTE — TELEPHONE ENCOUNTER
Nurse called about patient that they are treating for bladder cancer. Patient was telling them that they needed to call and talk to Dr. Giang on how to treat his bladder cancer. The nurse was trying to clarify on treatment plan and to make sure there was nothing that was done that they should know about. She said you could call her back at [phone].

## 2018-10-30 ENCOUNTER — HOSPITAL ENCOUNTER (OUTPATIENT)
Dept: HOSPITAL 89 - US | Age: 68
End: 2018-10-30
Attending: ORTHOPAEDIC SURGERY
Payer: MEDICARE

## 2018-10-30 VITALS — BODY MASS INDEX: 13.14 KG/M2

## 2018-10-30 DIAGNOSIS — M25.475: Primary | ICD-10-CM

## 2018-10-30 NOTE — RADIOLOGY IMAGING REPORT
FACILITY: [facility] [place] 

 

PATIENT NAME: Darryl Gannon

: 1950

MR: [id number]

V: [id number]

EXAM DATE: 

ORDERING PHYSICIAN: KATHERIN LISA

TECHNOLOGIST: 

 

Location: [facility]

Patient: Darryl Gannon

: 1950

MRN: [id number]

Visit/Account:[id number]

Date of Sevice: 10/30/2018

 

ACCESSION #: [id number]

 

Exam type: VENOUS DOPP LOW LEFT EXTREMITY

 

History: Left foot swelling total hip replacement 2018

 

Comparison: None.

 

Findings:

 

The left lower extremity veins were imaged including the left common femoral vein, greater saphenous 
vein superficial femoral vein, popliteal vein, posterior tibial vein, peroneal vein, and anterior tib
ial veins revealing no evidence intraluminal thrombi.  The veins were compressible and demonstrated a
ugmentation.

 

IMPRESSION:

 

1.  No sonographic evidence of DVT involving the left lower extremity veins

 

Report Dictated By: Dottie Hanna MD at 10/30/2018 11:53 AM

 

Report E-Signed By: Dottie Hanna MD  at 10/30/2018 11:56 AM

 

WSN:[id number]

## 2018-11-08 ENCOUNTER — HOSPITAL ENCOUNTER (OUTPATIENT)
Dept: HOSPITAL 89 - US | Age: 68
End: 2018-11-08
Attending: FAMILY MEDICINE
Payer: COMMERCIAL

## 2018-11-08 VITALS — BODY MASS INDEX: 13.14 KG/M2

## 2018-11-08 DIAGNOSIS — I73.9: Primary | ICD-10-CM

## 2018-11-08 NOTE — RADIOLOGY IMAGING REPORT
FACILITY: [facility] [place] 

 

PATIENT NAME: Darryl Ganonn

: 1950

MR: [id number]

V: [id number]

EXAM DATE: 

ORDERING PHYSICIAN: ISRRAEL DONG

TECHNOLOGIST: 

 

Location: [facility] [place]

Patient: Darryl Gannon

: 1950

MRN: [id number]

Visit/Account:[id number]

Date of Sevice: 2018

 

ACCESSION #: [id number]

 

ARTERIAL SEGMENTAL PRESSURES

 

EXAMINATION: Noninvasive vascular imaging of the right and left lower extremities.

 

Technique: There has been calculation of the ankle-brachial indices using brachial artery systolic bl
ood pressure and ankle systolic blood pressure.

 

HISTORY:   Left foot and lower leg swelling.  Cold.

 

COMPARISON:   CT examination the abdomen and pelvis May 3, 2018

 

Findings:

 

Right side:

 

Right JEANETTE: 1.10

Right TBI: 0.95

 

Grayscale imaging findings: Not included.

 

Right thigh pressure: 129 mm Hg.

Low thigh: 149 mmHg

Calf pressure: 156 mm Hg

Ankle pressure (PT): 127 mm Hg

Ankle pressure (DP): 146 mm Hg.

 

Pulse volume recordings.  Mildly decreased amplitude at the right high thigh as compared to the contr
alateral side.  Looking at the CT with contrast, there is not appear to be any evidence of asymmetric
 pelvic inflow disease on the CT examination from May 3, 2018.  There is augmentation from the high t
high to the calf with maintenance of the amplitude from the low thigh to the ankle.  Normal waveform 
morphology with a maintained dicrotic notch is noted.  Normal waveform at the Toe.

 

 

Left side:

 

Left JEANETTE: 1.16

Left TBI: 0.95

 

Grayscale imaging findings: No images obtained.

 

Left thigh pressure: 147 mm Hg

Low thigh pressure: 150 mmHg

Left calf pressure: 160 mm Hg

Left ankle pressure (PT): 143 mm Hg

Left ankle pressure (DP): 154  mm Hg.

 

Pulse volume recording: Normal waveform morphology at the high thigh.  There is slight decreased ampl
itude from the high thigh to the calf suggestive of some degree of underlying femoral-popliteal disea
se.  There is no significant pressure gradient to suggest that this is hemodynamically significant.  
There is augmentation from the low thigh to the ankle.  Normal waveform morphology of the Toe.

 

IMPRESSION:

1.  On the right, JEANETTE 1.10, normal at rest.  Normal toe brachial index without noninvasive evidence o
f hemodynamically significant peripheral artery disease.

2.  On the left: 1.16, normal at rest.  Normal toe brachial index.  No evidence of significant underl
alyssa peripheral artery disease

 

Report Dictated By: Rome Olivas MD at 2018 5:44 PM

 

Report E-Signed By: Rome Olivas MD  at 2018 5:53 PM

 

WSN:[id number]

## 2019-01-09 ENCOUNTER — OFFICE VISIT (OUTPATIENT)
Dept: UROLOGY | Facility: CLINIC | Age: 69
End: 2019-01-09

## 2019-01-09 VITALS — WEIGHT: 165 LBS | TEMPERATURE: 98.7 F | BODY MASS INDEX: 23.62 KG/M2 | HEIGHT: 70 IN

## 2019-01-09 DIAGNOSIS — C67.9 MALIGNANT NEOPLASM OF URINARY BLADDER, UNSPECIFIED SITE (HCC): Primary | ICD-10-CM

## 2019-01-09 LAB
BILIRUB BLD-MCNC: NEGATIVE MG/DL
CLARITY, POC: CLEAR
COLOR UR: YELLOW
GLUCOSE UR STRIP-MCNC: NEGATIVE MG/DL
KETONES UR QL: NEGATIVE
LEUKOCYTE EST, POC: NEGATIVE
NITRITE UR-MCNC: NEGATIVE MG/ML
PH UR: 5.5 [PH] (ref 5–8)
PROT UR STRIP-MCNC: NEGATIVE MG/DL
RBC # UR STRIP: ABNORMAL /UL
SP GR UR: 1.02 (ref 1–1.03)
UROBILINOGEN UR QL: NORMAL

## 2019-01-09 PROCEDURE — 99212 OFFICE O/P EST SF 10 MIN: CPT | Performed by: UROLOGY

## 2019-01-09 PROCEDURE — 81003 URINALYSIS AUTO W/O SCOPE: CPT | Performed by: UROLOGY

## 2019-01-09 NOTE — PROGRESS NOTES
Mr. Mina is 68 y.o. 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 This is a Previously diagnosed diagnosis.. The patient was initially diagnosed 3 month(s) ago. Severity is best is explained as high grade superficial disease. Previous management includes TURBT. Symptoms include no hematuria, dysuria or flank pain.          The following portions of the patient's history were reviewed and updated as appropriate: allergies, current medications, past family history, past medical history, past social history, past surgical history and problem list.    Review of Systems   Constitutional: Negative for chills and fever.   Gastrointestinal: Negative for abdominal pain, anal bleeding and blood in stool.   Genitourinary: Negative for decreased urine volume, difficulty urinating, discharge, dysuria, enuresis, flank pain, frequency, genital sores, hematuria, penile pain, penile swelling, scrotal swelling, testicular pain and urgency.         Current Outpatient Medications:   •  ALPRAZolam (XANAX) 1 MG tablet, Take 1 mg by mouth 2 (Two) Times a Day As Needed for Anxiety., Disp: , Rfl:   •  budesonide-formoterol (SYMBICORT) 160-4.5 MCG/ACT inhaler, Inhale 2 puffs 2 (Two) Times a Day., Disp: , Rfl:   •  HYDROcodone-acetaminophen (NORCO) 5-325 MG per tablet, Take 1 tablet by mouth Every 6 (Six) Hours As Needed for Moderate Pain ., Disp: 12 tablet, Rfl: 0  •  ipratropium-albuterol (COMBIVENT RESPIMAT)  MCG/ACT inhaler, Inhale 1 puff 4 (Four) Times a Day As Needed for Wheezing., Disp: , Rfl:   •  traZODone (DESYREL) 100 MG tablet, Take 100 mg by mouth Every Night., Disp: , Rfl:     Past Medical History:   Diagnosis Date   • Anxiety    • Cancer (CMS/HCC)     bladder - karla dr trotter   • Cirrhosis (CMS/HCC)    • COPD (chronic obstructive pulmonary disease) (CMS/HCC)    • Hematuria   "      Past Surgical History:   Procedure Laterality Date   • CYSTOSCOPY     • ENDOSCOPY N/A 9/20/2018    Procedure: ESOPHAGOGASTRODUODENOSCOPY WITH ANESTHESIA;  Surgeon: Sanford Barahona DO;  Location: [facility] ENDOSCOPY;  Service: Gastroenterology   • EXPLORATORY LAPAROTOMY     • LIVER SURGERY  2018   • TRANSURETHRAL RESECTION OF BLADDER TUMOR N/A 9/28/2018    Procedure: CYSTOSCOPY TRANSURETHRAL RESECTION OF BLADDER TUMOR;  Surgeon: Paul Giang MD;  Location: [facility] OR;  Service: Urology       Social History     Socioeconomic History   • Marital status:      Spouse name: Not on file   • Number of children: Not on file   • Years of education: Not on file   • Highest education level: Not on file   Tobacco Use   • Smoking status: Current Every Day Smoker     Packs/day: 1.00     Years: 40.00     Pack years: 40.00   • Smokeless tobacco: Never Used   Substance and Sexual Activity   • Alcohol use: Yes     Comment: 1/2 case beer daily, cutting back to  5 beers a day   • Drug use: No   • Sexual activity: Defer       Family History   Problem Relation Age of Onset   • Colon cancer Father        Objective    Temp 98.7 °F (37.1 °C)   Ht 177 cm (69.69\")   Wt 74.8 kg (165 lb)   BMI 23.89 kg/m²     Physical Exam    Office Visit on 10/12/2018   Component Date Value Ref Range Status   • Color 10/12/2018 Yellow  Yellow, Straw, Dark Yellow, Gabrielle Final   • Clarity, UA 10/12/2018 Clear  Clear Final   • Glucose, UA 10/12/2018 Negative  Negative, 1000 mg/dL (3+) mg/dL Final   • Bilirubin 10/12/2018 Negative  Negative Final   • Ketones, UA 10/12/2018 Negative  Negative Final   • Specific Gravity  10/12/2018 1.020  1.005 - 1.030 Final   • Blood, UA 10/12/2018 Small* Negative Final   • pH, Urine 10/12/2018 7.0  5.0 - 8.0 Final   • Protein, POC 10/12/2018 Negative  Negative mg/dL Final   • Urobilinogen, UA 10/12/2018 1 E.U./dL * Normal Final   • Nitrite, UA 10/12/2018 Negative  Negative Final   • Leukocytes 10/12/2018 " Moderate (2+)* Negative Final       Results for orders placed or performed in visit on 01/09/19   POC Urinalysis Dipstick, Multipro   Result Value Ref Range    Color Yellow Yellow, Straw, Dark Yellow, Gabrielle    Clarity, UA Clear Clear    Glucose, UA Negative Negative, 1000 mg/dL (3+) mg/dL    Bilirubin Negative Negative    Ketones, UA Negative Negative    Specific Gravity  1.020 1.005 - 1.030    Blood, UA Trace (A) Negative    pH, Urine 5.5 5.0 - 8.0    Protein, POC Negative Negative mg/dL    Urobilinogen, UA Normal Normal    Nitrite, UA Negative Negative    Leukocytes Negative Negative     Patient's Body mass index is 23.89 kg/m². BMI is within normal parameters. No follow-up required.    Assessment and Plan    Elder was seen today for bladder cancer.    Diagnoses and all orders for this visit:    Malignant neoplasm of urinary bladder, unspecified site (CMS/HCC)  -     POC Urinalysis Dipstick, Multipro    Patient with history of high-grade TA bladder cancer.  I initially saw him and his reresection from a previous resection done at [place].  At time he and I discussed doing BCG but because of his issues with his liver I was concerned about starting him on this and his possibly immunocompromised state.  He was sent back to [place] and it was felt by the urologist that he could be placed on BCG.  However this was not started as he did unfortunately have a new finding of a hepatocellular carcinoma.  He underwent radiotherapy of the liver as well as chemotherapy which he finished a few weeks ago.    It is my opinion that I would not consider BCG at this point because he is immunocompromised in the setting of chemotherapy.  He is due for cystoscopy would like to that in the office for him and move forward from there.    Cystoscopy as the definitive lower urinary tract study is discussed . The risks of pain and discomfort, infection, and urethral stricture are discussed with the patient including the technique  used in the office setting.  All patient questions were answered.

## 2019-01-15 ENCOUNTER — PROCEDURE VISIT (OUTPATIENT)
Dept: UROLOGY | Facility: CLINIC | Age: 69
End: 2019-01-15

## 2019-01-15 DIAGNOSIS — C67.9 MALIGNANT NEOPLASM OF URINARY BLADDER, UNSPECIFIED SITE (HCC): Primary | ICD-10-CM

## 2019-01-15 LAB
BILIRUB BLD-MCNC: NEGATIVE MG/DL
CLARITY, POC: CLEAR
COLOR UR: YELLOW
GLUCOSE UR STRIP-MCNC: NEGATIVE MG/DL
KETONES UR QL: NEGATIVE
LEUKOCYTE EST, POC: ABNORMAL
NITRITE UR-MCNC: NEGATIVE MG/ML
PH UR: 5.5 [PH] (ref 5–8)
PROT UR STRIP-MCNC: NEGATIVE MG/DL
RBC # UR STRIP: ABNORMAL /UL
SP GR UR: 1.02 (ref 1–1.03)
UROBILINOGEN UR QL: NORMAL

## 2019-01-15 PROCEDURE — 52000 CYSTOURETHROSCOPY: CPT | Performed by: UROLOGY

## 2019-01-15 PROCEDURE — 99213 OFFICE O/P EST LOW 20 MIN: CPT | Performed by: UROLOGY

## 2019-01-15 PROCEDURE — 81003 URINALYSIS AUTO W/O SCOPE: CPT | Performed by: UROLOGY

## 2019-01-15 NOTE — PROGRESS NOTES
Pre- operative diagnosis:  Bladder cancer surveillance    Post operative diagnosis:  Bladder Tumor    Procedure:  The patient was prepped and draped in a normal sterile fashion.  The urethra was anesthetized with 2% lidocaine jelly.  A flexible cystoscope was introduced per urethra.      Urethra:  Normal    Bladder:  At least one frondular tumor in the posterior aspect the bladder with multiple erythematous patches throughout the bladder concerning for possible disease    Ureteral orifices:  Normal position bilaterally    Prostate:  normal    Patient tolerated the procedure well    Complications: none    Blood loss: minimal    Follow up:    Schedule for OR  TURBT    Mr. Mina is 68 y.o. male    Chief Complaint   Patient presents with   • Bladder Cancer       History of Present Illness  History of bladder cancer treated with transurethral resection of the bladder.  Previous disease is high grade T1.  No BCG was used in this case due to the fact that he does have a liver cancer and is receiving chemotherapy.  Recurrent lesions found on cystoscopy today    The following portions of the patient's history were reviewed and updated as appropriate: allergies, current medications, past family history, past medical history, past social history, past surgical history and problem list.    Review of Systems   Constitutional: Negative for chills and fever.   Gastrointestinal: Negative for abdominal pain, anal bleeding and blood in stool.   Genitourinary: Negative for decreased urine volume, difficulty urinating, discharge, dysuria, enuresis, flank pain, frequency, genital sores, hematuria, penile pain, penile swelling, scrotal swelling, testicular pain and urgency.       I have reviewed the review of systems      Current Outpatient Medications:   •  ALPRAZolam (XANAX) 1 MG tablet, Take 1 mg by mouth 2 (Two) Times a Day As Needed for Anxiety., Disp: , Rfl:   •  budesonide-formoterol (SYMBICORT) 160-4.5 MCG/ACT inhaler, Inhale 2  puffs 2 (Two) Times a Day., Disp: , Rfl:   •  HYDROcodone-acetaminophen (NORCO) 5-325 MG per tablet, Take 1 tablet by mouth Every 6 (Six) Hours As Needed for Moderate Pain ., Disp: 12 tablet, Rfl: 0  •  ipratropium-albuterol (COMBIVENT RESPIMAT)  MCG/ACT inhaler, Inhale 1 puff 4 (Four) Times a Day As Needed for Wheezing., Disp: , Rfl:   •  traZODone (DESYREL) 100 MG tablet, Take 100 mg by mouth Every Night., Disp: , Rfl:     Past Medical History:   Diagnosis Date   • Anxiety    • Cancer (CMS/HCC)     bladder - [facility] dr trotter   • Cirrhosis (CMS/HCC)    • COPD (chronic obstructive pulmonary disease) (CMS/HCC)    • Hematuria        Past Surgical History:   Procedure Laterality Date   • CYSTOSCOPY     • ENDOSCOPY N/A 9/20/2018    Procedure: ESOPHAGOGASTRODUODENOSCOPY WITH ANESTHESIA;  Surgeon: Sanford Barahona DO;  Location: [facility] ENDOSCOPY;  Service: Gastroenterology   • EXPLORATORY LAPAROTOMY     • LIVER SURGERY  2018   • TRANSURETHRAL RESECTION OF BLADDER TUMOR N/A 9/28/2018    Procedure: CYSTOSCOPY TRANSURETHRAL RESECTION OF BLADDER TUMOR;  Surgeon: Paul Giang MD;  Location: [facility] OR;  Service: Urology       Social History     Socioeconomic History   • Marital status:      Spouse name: Not on file   • Number of children: Not on file   • Years of education: Not on file   • Highest education level: Not on file   Tobacco Use   • Smoking status: Current Every Day Smoker     Packs/day: 1.00     Years: 40.00     Pack years: 40.00   • Smokeless tobacco: Never Used   Substance and Sexual Activity   • Alcohol use: Yes     Comment: 1/2 case beer daily, cutting back to  5 beers a day   • Drug use: No   • Sexual activity: Defer       Family History   Problem Relation Age of Onset   • Colon cancer Father        Objective    There were no vitals taken for this visit.    Physical Exam   Constitutional: He is oriented to person, place, and time. He appears well-developed and well-nourished. No distress.    Pulmonary/Chest: Effort normal.   Abdominal: Soft. He exhibits no distension and no mass. There is no tenderness. There is no rebound and no guarding. No hernia.   Neurological: He is alert and oriented to person, place, and time.   Skin: Skin is warm and dry. He is not diaphoretic.   Psychiatric: He has a normal mood and affect.   Vitals reviewed.      Office Visit on 01/09/2019   Component Date Value Ref Range Status   • Color 01/09/2019 Yellow  Yellow, Straw, Dark Yellow, Gabrielle Final   • Clarity, UA 01/09/2019 Clear  Clear Final   • Glucose, UA 01/09/2019 Negative  Negative, 1000 mg/dL (3+) mg/dL Final   • Bilirubin 01/09/2019 Negative  Negative Final   • Ketones, UA 01/09/2019 Negative  Negative Final   • Specific Gravity  01/09/2019 1.020  1.005 - 1.030 Final   • Blood, UA 01/09/2019 Trace* Negative Final   • pH, Urine 01/09/2019 5.5  5.0 - 8.0 Final   • Protein, POC 01/09/2019 Negative  Negative mg/dL Final   • Urobilinogen, UA 01/09/2019 Normal  Normal Final   • Nitrite, UA 01/09/2019 Negative  Negative Final   • Leukocytes 01/09/2019 Negative  Negative Final       Results for orders placed or performed in visit on 01/15/19   POC Urinalysis Dipstick, Multipro   Result Value Ref Range    Color Yellow Yellow, Straw, Dark Yellow, Gabrielle    Clarity, UA Clear Clear    Glucose, UA Negative Negative, 1000 mg/dL (3+) mg/dL    Bilirubin Negative Negative    Ketones, UA Negative Negative    Specific Gravity  1.020 1.005 - 1.030    Blood, UA Trace (A) Negative    pH, Urine 5.5 5.0 - 8.0    Protein, POC Negative Negative mg/dL    Urobilinogen, UA Normal Normal    Nitrite, UA Negative Negative    Leukocytes Trace (A) Negative     Assessment and Plan    Elder was seen today for bladder cancer.    Diagnoses and all orders for this visit:    Malignant neoplasm of urinary bladder, unspecified site (CMS/HCC)  -     POC Urinalysis Dipstick, Multipro  -     Case Request; Standing  -     levoFLOXacin (LEVAQUIN) 500 mg in  Sodium chloride 0.9 % 150 mL IVPB; Infuse 500 mg into a venous catheter 1 (One) Time.  -     Case Request    Other orders  -     Follow Anesthesia Guidelines / Standing Orders; Future  -     Obtain informed consent  -     Provide NPO Instructions to Patient; Future  -     Follow Anesthesia Guidelines / Standing Orders; Standing  -     Verify NPO Status; Standing  -     Verify / Perform Chlorhexidine Skin Prep if Indicated (If Not Already Completed); Standing    Multiple erythematous passages with at least one frondular tumor seen on cystoscopy today.  I do think he likely has recurrence of his bladder cancer.  We discussed options and he would like to undergo transurethral resection of bladder tumor.  He understands the risk including but not limited to bleeding, infection, damage to bladder, bladder perforation requiring repair, urethral stricture or scarring, need for further surgery.    Patient is under the care of an oncologist in [place] for his liver cancer.  He is to follow-up with that oncologist later this week    A 25 modifier was added to this cystoscopy due to the finding of a bladder tumor and the discussion on the risk and benefits of resection.

## 2019-01-15 NOTE — H&P (VIEW-ONLY)
Pre- operative diagnosis:  Bladder cancer surveillance    Post operative diagnosis:  Bladder Tumor    Procedure:  The patient was prepped and draped in a normal sterile fashion.  The urethra was anesthetized with 2% lidocaine jelly.  A flexible cystoscope was introduced per urethra.      Urethra:  Normal    Bladder:  At least one frondular tumor in the posterior aspect the bladder with multiple erythematous patches throughout the bladder concerning for possible disease    Ureteral orifices:  Normal position bilaterally    Prostate:  normal    Patient tolerated the procedure well    Complications: none    Blood loss: minimal    Follow up:    Schedule for OR  TURBT    Mr. Mina is 68 y.o. male    Chief Complaint   Patient presents with   • Bladder Cancer       History of Present Illness  History of bladder cancer treated with transurethral resection of the bladder.  Previous disease is high grade T1.  No BCG was used in this case due to the fact that he does have a liver cancer and is receiving chemotherapy.  Recurrent lesions found on cystoscopy today    The following portions of the patient's history were reviewed and updated as appropriate: allergies, current medications, past family history, past medical history, past social history, past surgical history and problem list.    Review of Systems   Constitutional: Negative for chills and fever.   Gastrointestinal: Negative for abdominal pain, anal bleeding and blood in stool.   Genitourinary: Negative for decreased urine volume, difficulty urinating, discharge, dysuria, enuresis, flank pain, frequency, genital sores, hematuria, penile pain, penile swelling, scrotal swelling, testicular pain and urgency.       I have reviewed the review of systems      Current Outpatient Medications:   •  ALPRAZolam (XANAX) 1 MG tablet, Take 1 mg by mouth 2 (Two) Times a Day As Needed for Anxiety., Disp: , Rfl:   •  budesonide-formoterol (SYMBICORT) 160-4.5 MCG/ACT inhaler, Inhale 2  puffs 2 (Two) Times a Day., Disp: , Rfl:   •  HYDROcodone-acetaminophen (NORCO) 5-325 MG per tablet, Take 1 tablet by mouth Every 6 (Six) Hours As Needed for Moderate Pain ., Disp: 12 tablet, Rfl: 0  •  ipratropium-albuterol (COMBIVENT RESPIMAT)  MCG/ACT inhaler, Inhale 1 puff 4 (Four) Times a Day As Needed for Wheezing., Disp: , Rfl:   •  traZODone (DESYREL) 100 MG tablet, Take 100 mg by mouth Every Night., Disp: , Rfl:     Past Medical History:   Diagnosis Date   • Anxiety    • Cancer (CMS/HCC)     bladder - [facility] dr trotter   • Cirrhosis (CMS/HCC)    • COPD (chronic obstructive pulmonary disease) (CMS/HCC)    • Hematuria        Past Surgical History:   Procedure Laterality Date   • CYSTOSCOPY     • ENDOSCOPY N/A 9/20/2018    Procedure: ESOPHAGOGASTRODUODENOSCOPY WITH ANESTHESIA;  Surgeon: Sanford Barahona DO;  Location: [facility] ENDOSCOPY;  Service: Gastroenterology   • EXPLORATORY LAPAROTOMY     • LIVER SURGERY  2018   • TRANSURETHRAL RESECTION OF BLADDER TUMOR N/A 9/28/2018    Procedure: CYSTOSCOPY TRANSURETHRAL RESECTION OF BLADDER TUMOR;  Surgeon: Paul Giang MD;  Location: [facility] OR;  Service: Urology       Social History     Socioeconomic History   • Marital status:      Spouse name: Not on file   • Number of children: Not on file   • Years of education: Not on file   • Highest education level: Not on file   Tobacco Use   • Smoking status: Current Every Day Smoker     Packs/day: 1.00     Years: 40.00     Pack years: 40.00   • Smokeless tobacco: Never Used   Substance and Sexual Activity   • Alcohol use: Yes     Comment: 1/2 case beer daily, cutting back to  5 beers a day   • Drug use: No   • Sexual activity: Defer       Family History   Problem Relation Age of Onset   • Colon cancer Father        Objective    There were no vitals taken for this visit.    Physical Exam   Constitutional: He is oriented to person, place, and time. He appears well-developed and well-nourished. No distress.    Pulmonary/Chest: Effort normal.   Abdominal: Soft. He exhibits no distension and no mass. There is no tenderness. There is no rebound and no guarding. No hernia.   Neurological: He is alert and oriented to person, place, and time.   Skin: Skin is warm and dry. He is not diaphoretic.   Psychiatric: He has a normal mood and affect.   Vitals reviewed.      Office Visit on 01/09/2019   Component Date Value Ref Range Status   • Color 01/09/2019 Yellow  Yellow, Straw, Dark Yellow, Gabrielle Final   • Clarity, UA 01/09/2019 Clear  Clear Final   • Glucose, UA 01/09/2019 Negative  Negative, 1000 mg/dL (3+) mg/dL Final   • Bilirubin 01/09/2019 Negative  Negative Final   • Ketones, UA 01/09/2019 Negative  Negative Final   • Specific Gravity  01/09/2019 1.020  1.005 - 1.030 Final   • Blood, UA 01/09/2019 Trace* Negative Final   • pH, Urine 01/09/2019 5.5  5.0 - 8.0 Final   • Protein, POC 01/09/2019 Negative  Negative mg/dL Final   • Urobilinogen, UA 01/09/2019 Normal  Normal Final   • Nitrite, UA 01/09/2019 Negative  Negative Final   • Leukocytes 01/09/2019 Negative  Negative Final       Results for orders placed or performed in visit on 01/15/19   POC Urinalysis Dipstick, Multipro   Result Value Ref Range    Color Yellow Yellow, Straw, Dark Yellow, Gabrielle    Clarity, UA Clear Clear    Glucose, UA Negative Negative, 1000 mg/dL (3+) mg/dL    Bilirubin Negative Negative    Ketones, UA Negative Negative    Specific Gravity  1.020 1.005 - 1.030    Blood, UA Trace (A) Negative    pH, Urine 5.5 5.0 - 8.0    Protein, POC Negative Negative mg/dL    Urobilinogen, UA Normal Normal    Nitrite, UA Negative Negative    Leukocytes Trace (A) Negative     Assessment and Plan    Elder was seen today for bladder cancer.    Diagnoses and all orders for this visit:    Malignant neoplasm of urinary bladder, unspecified site (CMS/HCC)  -     POC Urinalysis Dipstick, Multipro  -     Case Request; Standing  -     levoFLOXacin (LEVAQUIN) 500 mg in  Sodium chloride 0.9 % 150 mL IVPB; Infuse 500 mg into a venous catheter 1 (One) Time.  -     Case Request    Other orders  -     Follow Anesthesia Guidelines / Standing Orders; Future  -     Obtain informed consent  -     Provide NPO Instructions to Patient; Future  -     Follow Anesthesia Guidelines / Standing Orders; Standing  -     Verify NPO Status; Standing  -     Verify / Perform Chlorhexidine Skin Prep if Indicated (If Not Already Completed); Standing    Multiple erythematous passages with at least one frondular tumor seen on cystoscopy today.  I do think he likely has recurrence of his bladder cancer.  We discussed options and he would like to undergo transurethral resection of bladder tumor.  He understands the risk including but not limited to bleeding, infection, damage to bladder, bladder perforation requiring repair, urethral stricture or scarring, need for further surgery.    Patient is under the care of an oncologist in [place] for his liver cancer.  He is to follow-up with that oncologist later this week    A 25 modifier was added to this cystoscopy due to the finding of a bladder tumor and the discussion on the risk and benefits of resection.

## 2019-01-16 ENCOUNTER — APPOINTMENT (OUTPATIENT)
Dept: PREADMISSION TESTING | Facility: HOSPITAL | Age: 69
End: 2019-01-16

## 2019-01-16 VITALS
WEIGHT: 163.14 LBS | HEART RATE: 104 BPM | HEIGHT: 69 IN | DIASTOLIC BLOOD PRESSURE: 66 MMHG | OXYGEN SATURATION: 92 % | BODY MASS INDEX: 24.16 KG/M2 | RESPIRATION RATE: 18 BRPM | SYSTOLIC BLOOD PRESSURE: 137 MMHG

## 2019-01-16 LAB
ALBUMIN SERPL-MCNC: 4.2 G/DL (ref 3.5–5)
ALBUMIN/GLOB SERPL: 1 G/DL (ref 1.1–2.5)
ALP SERPL-CCNC: 232 U/L (ref 24–120)
ALT SERPL W P-5'-P-CCNC: 44 U/L (ref 0–54)
ANION GAP SERPL CALCULATED.3IONS-SCNC: 9 MMOL/L (ref 4–13)
AST SERPL-CCNC: 73 U/L (ref 7–45)
BILIRUB SERPL-MCNC: 0.9 MG/DL (ref 0.1–1)
BUN BLD-MCNC: 15 MG/DL (ref 5–21)
BUN/CREAT SERPL: 25.9 (ref 7–25)
CALCIUM SPEC-SCNC: 9.5 MG/DL (ref 8.4–10.4)
CHLORIDE SERPL-SCNC: 96 MMOL/L (ref 98–110)
CO2 SERPL-SCNC: 33 MMOL/L (ref 24–31)
CREAT BLD-MCNC: 0.58 MG/DL (ref 0.5–1.4)
DEPRECATED RDW RBC AUTO: 54.2 FL (ref 40–54)
ERYTHROCYTE [DISTWIDTH] IN BLOOD BY AUTOMATED COUNT: 14.3 % (ref 12–15)
GFR SERPL CREATININE-BSD FRML MDRD: 139 ML/MIN/1.73
GLOBULIN UR ELPH-MCNC: 4.2 GM/DL
GLUCOSE BLD-MCNC: 104 MG/DL (ref 70–100)
HCT VFR BLD AUTO: 44.4 % (ref 40–52)
HGB BLD-MCNC: 15.2 G/DL (ref 14–18)
MCH RBC QN AUTO: 35.3 PG (ref 28–32)
MCHC RBC AUTO-ENTMCNC: 34.2 G/DL (ref 33–36)
MCV RBC AUTO: 103.3 FL (ref 82–95)
PLATELET # BLD AUTO: 134 10*3/MM3 (ref 130–400)
PMV BLD AUTO: 10.5 FL (ref 6–12)
POTASSIUM BLD-SCNC: 4.1 MMOL/L (ref 3.5–5.3)
PROT SERPL-MCNC: 8.4 G/DL (ref 6.3–8.7)
RBC # BLD AUTO: 4.3 10*6/MM3 (ref 4.8–5.9)
SODIUM BLD-SCNC: 138 MMOL/L (ref 135–145)
WBC NRBC COR # BLD: 7.51 10*3/MM3 (ref 4.8–10.8)

## 2019-01-16 PROCEDURE — 85027 COMPLETE CBC AUTOMATED: CPT | Performed by: UROLOGY

## 2019-01-16 PROCEDURE — 36415 COLL VENOUS BLD VENIPUNCTURE: CPT

## 2019-01-16 PROCEDURE — 80053 COMPREHEN METABOLIC PANEL: CPT | Performed by: UROLOGY

## 2019-01-16 NOTE — DISCHARGE INSTRUCTIONS
DAY OF SURGERY INSTRUCTIONS        YOUR SURGEON: IRINA SCOTT    PROCEDURE: CYSTOSCOPY, TRANSURETHRAL RESECTION OF BLADDER TUMOR    DATE OF SURGERY: January 21, 2019    ARRIVAL TIME: AS DIRECTED BY OFFICE    YOU MAY TAKE THE FOLLOWING MEDICATION(S) THE MORNING OF SURGERY WITH A SIP OF WATER: INHALER, XANAX              MANAGING PAIN AFTER SURGERY    We know you are probably wondering what your pain will be like after surgery.  Following surgery it is unrealistic to expect you will not have pain.   Pain is how our bodies let us know that something is wrong or cautions us to be careful.  That said, our goal is to make your pain tolerable.    Methods we may use to treat your pain include (oral or IV medications, PCAs, epidurals, nerve blocks, etc.)   While some procedures require IV pain medications for a short time after surgery, transitioning to pain medications by mouth allows for better management of pain.   Your nurse will encourage you to take oral pain medications whenever possible.  IV medications work almost immediately, but only last a short while.  Taking medications by mouth allows for a more constant level of medication in your blood stream for a longer period of time.      Once your pain is out of control it is harder to get back under control.  It is important you are aware when your next dose of pain medication is due.  If you are admitted, your nurse may write the time of your next dose on the white board in your room to help you remember.      We are interested in your pain and encourage you to inform us about aggravating factors during your visit.   Many times a simple repositioning every few hours can make a big difference.    If your physician says it is okay, do not let your pain prevent you from getting out of bed. Be sure to call your nurse for assistance prior to getting up so you do not fall.      Before surgery, please decide your tolerable pain goal.  These faces help describe the pain  ratings we use on a 0-10 scale.   Be prepared to tell us your goal and whether or not you take pain or anxiety medications at home.            BEFORE YOU COME TO THE HOSPITAL  (Pre-op instructions)  • Do not eat, drink, smoke or chew gum after midnight the night before surgery.  This also includes no mints.  • Morning of surgery take only the medicines you have been instructed with a sip of water unless otherwise instructed  by your physician.  • Do not shave, wear makeup or dark nail polish.  • Remove all jewelry including rings.  • Leave anything you consider valuable at home.  • Leave your suitcase in the car until after your surgery.  • Bring the following with you if applicable:  o Picture ID and insurance, Medicare or Medicaid cards  o Co-pay/deductible required by insurance (cash, check, credit card)  o Copy of advance directive, living will or power-of- documents if not brought to PAT  o CPAP or BIPAP mask and tubing  o Relaxation aids (MP3 player, book, magazine)  • On the day of surgery check in at registration located at the main entrance of the hospital.       Outpatient Surgery Guidelines, Adult  Outpatient procedures are those for which the person having the procedure is allowed to go home the same day as the procedure. Various procedures are done on an outpatient basis. You should follow some general guidelines if you will be having an outpatient procedure.  LET YOUR HEALTH CARE PROVIDER KNOW ABOUT:  · Any allergies you have.  · All medicines you are taking, including vitamins, herbs, eye drops, creams, and over-the-counter medicines.  · Previous problems you or members of your family have had with the use of anesthetics.  · Any blood disorders you have.  · Previous surgeries you have had.  · Medical conditions you have.  RISKS AND COMPLICATIONS  Your health care provider will discuss possible risks and complications with you before surgery. Common risks and complications include:     · Problems due to the use of anesthetics.  · Blood loss and replacement (does not apply to minor surgical procedures).  · Temporary increase in pain due to surgery.  · Uncorrected pain or problems that the surgery was meant to correct.  · Infection.  · New damage.  BEFORE THE PROCEDURE  · Ask your health care provider about changing or stopping your regular medicines. You may need to stop taking certain medicines in the days or weeks before the procedure.  · Stop smoking at least 2 weeks before surgery. This lowers your risk for complications during and after surgery. Ask your health care provider for help with this if needed.  · Eat your usual meals and a light supper the day before surgery. Continue fluid intake. Do not drink alcohol.  · Do not eat or drink after midnight the night before your surgery.   · Arrange for someone to take you home and to stay with you for 24 hours after the procedure. Medicine given for your procedure may affect your ability to drive or to care for yourself.  · Call your health care provider's office if you develop an illness or problem that may prevent you from safely having your procedure.  AFTER THE PROCEDURE  After surgery, you will be taken to a recovery area, where your progress will be monitored. If there are no complications, you will be allowed to go home when you are awake, stable, and taking fluids well. You may have numbness around the surgical site. Healing will take some time. You will have tenderness at the surgical site and may have some swelling and bruising. You may also have some nausea.  HOME CARE INSTRUCTIONS  · Do not drive for 24 hours, or as directed by your health care provider. Do not drive while taking prescription pain medicines.  · Do not drink alcohol for 24 hours.  · Do not make important decisions or sign legal documents for 24 hours.  · You may resume a normal diet and activities as directed.  · Do not lift anything heavier than 10 pounds (4.5 kg) or  play contact sports until your health care provider says it is okay.  · Change your bandages (dressings) as directed.  · Only take over-the-counter or prescription medicines as directed by your health care provider.  · Follow up with your health care provider as directed.  SEEK MEDICAL CARE IF:  · You have increased bleeding (more than a small spot) from the surgical site.  · You have redness, swelling, or increasing pain in the wound.  · You see pus coming from the wound.  · You have a fever.  · You notice a bad smell coming from the wound or dressing.  · You feel lightheaded or faint.  · You develop a rash.  · You have trouble breathing.  · You develop allergies.  MAKE SURE YOU:  · Understand these instructions.  · Will watch your condition.  · Will get help right away if you are not doing well or get worse.     This information is not intended to replace advice given to you by your health care provider. Make sure you discuss any questions you have with your health care provider.     Document Released: 09/12/2002 Document Revised: 05/03/2016 Document Reviewed: 05/22/2014  [employer] Interactive Patient Education ©2016 [employer]       Fall Prevention in Hospitals, Adult  As a hospital patient, your condition and the treatments you receive can increase your risk for falls. Some additional risk factors for falls in a hospital include:  · Being in an unfamiliar environment.  · Being on bed rest.  · Your surgery.  · Taking certain medicines.  · Your tubing requirements, such as intravenous (IV) therapy or catheters.  It is important that you learn how to decrease fall risks while at the hospital. Below are important tips that can help prevent falls.  SAFETY TIPS FOR PREVENTING FALLS  Talk about your risk of falling.  · Ask your health care provider why you are at risk for falling. Is it your medicine, illness, tubing placement, or something else?  · Make a plan with your health care provider to keep you safe from  falls.  · Ask your health care provider or pharmacist about side effects of your medicines. Some medicines can make you dizzy or affect your coordination.  Ask for help.  · Ask for help before getting out of bed. You may need to press your call button.  · Ask for assistance in getting safely to the toilet.  · Ask for a walker or cane to be put at your bedside. Ask that most of the side rails on your bed be placed up before your health care provider leaves the room.  · Ask family or friends to sit with you.  · Ask for things that are out of your reach, such as your glasses, hearing aids, telephone, bedside table, or call button.  Follow these tips to avoid falling:  · Stay lying or seated, rather than standing, while waiting for help.  · Wear rubber-soled slippers or shoes whenever you walk in the hospital.  · Avoid quick, sudden movements.  ¨ Change positions slowly.  ¨ Sit on the side of your bed before standing.  ¨ Stand up slowly and wait before you start to walk.  · Let your health care provider know if there is a spill on the floor.  · Pay careful attention to the medical equipment, electrical cords, and tubes around you.  · When you need help, use your call button by your bed or in the bathroom. Wait for one of your health care providers to help you.  · If you feel dizzy or unsure of your footing, return to bed and wait for assistance.  · Avoid being distracted by the TV, telephone, or another person in your room.  · Do not lean or support yourself on rolling objects, such as IV poles or bedside tables.     This information is not intended to replace advice given to you by your health care provider. Make sure you discuss any questions you have with your health care provider.     Document Released: 12/15/2001 Document Revised: 01/08/2016 Document Reviewed: 08/25/2013  [employer] Interactive Patient Education ©2016 [employer]       Surgical Site Infections FAQs  What is a Surgical Site Infection (SSI)?  A  surgical site infection is an infection that occurs after surgery in the part of the body where the surgery took place. Most patients who have surgery do not develop an infection. However, infections develop in about 1 to 3 out of every 100 patients who have surgery.  Some of the common symptoms of a surgical site infection are:  · Redness and pain around the area where you had surgery  · Drainage of cloudy fluid from your surgical wound  · Fever  Can SSIs be treated?  Yes. Most surgical site infections can be treated with antibiotics. The antibiotic given to you depends on the bacteria (germs) causing the infection. Sometimes patients with SSIs also need another surgery to treat the infection.  What are some of the things that hospitals are doing to prevent SSIs?  To prevent SSIs, doctors, nurses, and other healthcare providers:  · Clean their hands and arms up to their elbows with an antiseptic agent just before the surgery.  · Clean their hands with soap and water or an alcohol-based hand rub before and after caring for each patient.  · May remove some of your hair immediately before your surgery using electric clippers if the hair is in the same area where the procedure will occur. They should not shave you with a razor.  · Wear special hair covers, masks, gowns, and gloves during surgery to keep the surgery area clean.  · Give you antibiotics before your surgery starts. In most cases, you should get antibiotics within 60 minutes before the surgery starts and the antibiotics should be stopped within 24 hours after surgery.  · Clean the skin at the site of your surgery with a special soap that kills germs.  What can I do to help prevent SSIs?  Before your surgery:  · Tell your doctor about other medical problems you may have. Health problems such as allergies, diabetes, and obesity could affect your surgery and your treatment.  · Quit smoking. Patients who smoke get more infections. Talk to your doctor about how  you can quit before your surgery.  · Do not shave near where you will have surgery. Shaving with a razor can irritate your skin and make it easier to develop an infection.  At the time of your surgery:  · Speak up if someone tries to shave you with a razor before surgery. Ask why you need to be shaved and talk with your surgeon if you have any concerns.  · Ask if you will get antibiotics before surgery.  After your surgery:  · Make sure that your healthcare providers clean their hands before examining you, either with soap and water or an alcohol-based hand rub.  · If you do not see your providers clean their hands, please ask them to do so.  · Family and friends who visit you should not touch the surgical wound or dressings.  · Family and friends should clean their hands with soap and water or an alcohol-based hand rub before and after visiting you. If you do not see them clean their hands, ask them to clean their hands.  What do I need to do when I go home from the hospital?  · Before you go home, your doctor or nurse should explain everything you need to know about taking care of your wound. Make sure you understand how to care for your wound before you leave the hospital.  · Always clean your hands before and after caring for your wound.  · Before you go home, make sure you know who to contact if you have questions or problems after you get home.  · If you have any symptoms of an infection, such as redness and pain at the surgery site, drainage, or fever, call your doctor immediately.  If you have additional questions, please ask your doctor or nurse.  Developed and co-sponsored by The Society for Healthcare Epidemiology of [place] (SHEA); Infectious Diseases Society of [place] (IDSA); American Hospital Association; Association for Professionals in Infection Control and Epidemiology (APIC); Centers for Disease Control and Prevention (CDC); and The Joint Commission.     This information is not intended to replace  advice given to you by your health care provider. Make sure you discuss any questions you have with your health care provider.     Document Released: 12/23/2014 Document Revised: 01/08/2016 Document Reviewed: 03/02/2016  [employer] Interactive Patient Education ©2016 [employer]     PATIENT/FAMILY/RESPONSIBLE PARTY VERBALIZES UNDERSTANDING OF ABOVE EDUCATION.  COPY OF PAIN SCALE GIVEN AND REVIEWED WITH VERBALIZED UNDERSTANDING.

## 2019-01-21 ENCOUNTER — ANESTHESIA EVENT (OUTPATIENT)
Dept: PERIOP | Facility: HOSPITAL | Age: 69
End: 2019-01-21

## 2019-01-21 ENCOUNTER — ANESTHESIA (OUTPATIENT)
Dept: PERIOP | Facility: HOSPITAL | Age: 69
End: 2019-01-21

## 2019-01-21 ENCOUNTER — HOSPITAL ENCOUNTER (OUTPATIENT)
Facility: HOSPITAL | Age: 69
Setting detail: HOSPITAL OUTPATIENT SURGERY
Discharge: HOME | End: 2019-01-21
Attending: UROLOGY | Admitting: UROLOGY

## 2019-01-21 VITALS
HEART RATE: 75 BPM | TEMPERATURE: 97.7 F | RESPIRATION RATE: 16 BRPM | DIASTOLIC BLOOD PRESSURE: 49 MMHG | OXYGEN SATURATION: 89 % | SYSTOLIC BLOOD PRESSURE: 95 MMHG

## 2019-01-21 DIAGNOSIS — C67.9 MALIGNANT NEOPLASM OF URINARY BLADDER, UNSPECIFIED SITE (HCC): ICD-10-CM

## 2019-01-21 PROCEDURE — 25010000002 LEVOFLOXACIN PER 250 MG: Performed by: UROLOGY

## 2019-01-21 PROCEDURE — 52235 CYSTOSCOPY AND TREATMENT: CPT | Performed by: UROLOGY

## 2019-01-21 PROCEDURE — 25010000002 LEVOFLOXACIN PER 250 MG: Performed by: NURSE ANESTHETIST, CERTIFIED REGISTERED

## 2019-01-21 PROCEDURE — 88342 IMHCHEM/IMCYTCHM 1ST ANTB: CPT | Performed by: UROLOGY

## 2019-01-21 PROCEDURE — 25010000002 ONDANSETRON PER 1 MG: Performed by: NURSE ANESTHETIST, CERTIFIED REGISTERED

## 2019-01-21 PROCEDURE — 25010000002 DEXAMETHASONE PER 1 MG: Performed by: NURSE ANESTHETIST, CERTIFIED REGISTERED

## 2019-01-21 PROCEDURE — 88305 TISSUE EXAM BY PATHOLOGIST: CPT | Performed by: UROLOGY

## 2019-01-21 PROCEDURE — 25010000002 PROPOFOL 10 MG/ML EMULSION: Performed by: NURSE ANESTHETIST, CERTIFIED REGISTERED

## 2019-01-21 PROCEDURE — 25010000002 SUCCINYLCHOLINE PER 20 MG: Performed by: NURSE ANESTHETIST, CERTIFIED REGISTERED

## 2019-01-21 RX ORDER — LEVOFLOXACIN 5 MG/ML
500 INJECTION, SOLUTION INTRAVENOUS ONCE
Status: COMPLETED | OUTPATIENT
Start: 2019-01-21 | End: 2019-01-21

## 2019-01-21 RX ORDER — LABETALOL HYDROCHLORIDE 5 MG/ML
5 INJECTION, SOLUTION INTRAVENOUS
Status: DISCONTINUED | OUTPATIENT
Start: 2019-01-21 | End: 2019-01-21 | Stop reason: HOSPADM

## 2019-01-21 RX ORDER — IBUPROFEN 600 MG/1
600 TABLET ORAL ONCE AS NEEDED
Status: DISCONTINUED | OUTPATIENT
Start: 2019-01-21 | End: 2019-01-21 | Stop reason: HOSPADM

## 2019-01-21 RX ORDER — IPRATROPIUM BROMIDE AND ALBUTEROL SULFATE 2.5; .5 MG/3ML; MG/3ML
3 SOLUTION RESPIRATORY (INHALATION) ONCE AS NEEDED
Status: COMPLETED | OUTPATIENT
Start: 2019-01-21 | End: 2019-01-21

## 2019-01-21 RX ORDER — LIDOCAINE HYDROCHLORIDE 40 MG/ML
SOLUTION TOPICAL AS NEEDED
Status: DISCONTINUED | OUTPATIENT
Start: 2019-01-21 | End: 2019-01-21 | Stop reason: SURG

## 2019-01-21 RX ORDER — SODIUM CHLORIDE, SODIUM LACTATE, POTASSIUM CHLORIDE, CALCIUM CHLORIDE 600; 310; 30; 20 MG/100ML; MG/100ML; MG/100ML; MG/100ML
100 INJECTION, SOLUTION INTRAVENOUS CONTINUOUS
Status: DISCONTINUED | OUTPATIENT
Start: 2019-01-21 | End: 2019-01-21 | Stop reason: HOSPADM

## 2019-01-21 RX ORDER — SODIUM CHLORIDE, SODIUM LACTATE, POTASSIUM CHLORIDE, CALCIUM CHLORIDE 600; 310; 30; 20 MG/100ML; MG/100ML; MG/100ML; MG/100ML
1000 INJECTION, SOLUTION INTRAVENOUS CONTINUOUS
Status: DISCONTINUED | OUTPATIENT
Start: 2019-01-21 | End: 2019-01-21 | Stop reason: HOSPADM

## 2019-01-21 RX ORDER — LEVOFLOXACIN 5 MG/ML
INJECTION, SOLUTION INTRAVENOUS AS NEEDED
Status: DISCONTINUED | OUTPATIENT
Start: 2019-01-21 | End: 2019-01-21 | Stop reason: SURG

## 2019-01-21 RX ORDER — ROCURONIUM BROMIDE 10 MG/ML
INJECTION, SOLUTION INTRAVENOUS AS NEEDED
Status: DISCONTINUED | OUTPATIENT
Start: 2019-01-21 | End: 2019-01-21 | Stop reason: SURG

## 2019-01-21 RX ORDER — METOCLOPRAMIDE HYDROCHLORIDE 5 MG/ML
5 INJECTION INTRAMUSCULAR; INTRAVENOUS
Status: DISCONTINUED | OUTPATIENT
Start: 2019-01-21 | End: 2019-01-21 | Stop reason: HOSPADM

## 2019-01-21 RX ORDER — MEPERIDINE HYDROCHLORIDE 25 MG/ML
12.5 INJECTION INTRAMUSCULAR; INTRAVENOUS; SUBCUTANEOUS
Status: DISCONTINUED | OUTPATIENT
Start: 2019-01-21 | End: 2019-01-21 | Stop reason: HOSPADM

## 2019-01-21 RX ORDER — SUCCINYLCHOLINE CHLORIDE 20 MG/ML
INJECTION INTRAMUSCULAR; INTRAVENOUS AS NEEDED
Status: DISCONTINUED | OUTPATIENT
Start: 2019-01-21 | End: 2019-01-21 | Stop reason: SURG

## 2019-01-21 RX ORDER — LEVOFLOXACIN 500 MG/1
500 TABLET, FILM COATED ORAL DAILY
Qty: 3 TABLET | Refills: 0 | Status: SHIPPED | OUTPATIENT
Start: 2019-01-21 | End: 2019-01-24

## 2019-01-21 RX ORDER — SODIUM CHLORIDE 0.9 % (FLUSH) 0.9 %
3 SYRINGE (ML) INJECTION EVERY 12 HOURS SCHEDULED
Status: DISCONTINUED | OUTPATIENT
Start: 2019-01-21 | End: 2019-01-21 | Stop reason: HOSPADM

## 2019-01-21 RX ORDER — ONDANSETRON 2 MG/ML
INJECTION INTRAMUSCULAR; INTRAVENOUS AS NEEDED
Status: DISCONTINUED | OUTPATIENT
Start: 2019-01-21 | End: 2019-01-21 | Stop reason: SURG

## 2019-01-21 RX ORDER — OXYCODONE AND ACETAMINOPHEN 7.5; 325 MG/1; MG/1
2 TABLET ORAL EVERY 4 HOURS PRN
Status: DISCONTINUED | OUTPATIENT
Start: 2019-01-21 | End: 2019-01-21 | Stop reason: HOSPADM

## 2019-01-21 RX ORDER — SORBITOL 30 G/1000ML
IRRIGANT IRRIGATION AS NEEDED
Status: DISCONTINUED | OUTPATIENT
Start: 2019-01-21 | End: 2019-01-21 | Stop reason: HOSPADM

## 2019-01-21 RX ORDER — SODIUM CHLORIDE 0.9 % (FLUSH) 0.9 %
3 SYRINGE (ML) INJECTION AS NEEDED
Status: DISCONTINUED | OUTPATIENT
Start: 2019-01-21 | End: 2019-01-21 | Stop reason: HOSPADM

## 2019-01-21 RX ORDER — ONDANSETRON 2 MG/ML
4 INJECTION INTRAMUSCULAR; INTRAVENOUS ONCE AS NEEDED
Status: DISCONTINUED | OUTPATIENT
Start: 2019-01-21 | End: 2019-01-21 | Stop reason: SDUPTHER

## 2019-01-21 RX ORDER — DEXAMETHASONE SODIUM PHOSPHATE 4 MG/ML
INJECTION, SOLUTION INTRA-ARTICULAR; INTRALESIONAL; INTRAMUSCULAR; INTRAVENOUS; SOFT TISSUE AS NEEDED
Status: DISCONTINUED | OUTPATIENT
Start: 2019-01-21 | End: 2019-01-21 | Stop reason: SURG

## 2019-01-21 RX ORDER — MAGNESIUM HYDROXIDE 1200 MG/15ML
LIQUID ORAL AS NEEDED
Status: DISCONTINUED | OUTPATIENT
Start: 2019-01-21 | End: 2019-01-21 | Stop reason: HOSPADM

## 2019-01-21 RX ORDER — VASOPRESSIN 20 U/ML
INJECTION PARENTERAL AS NEEDED
Status: DISCONTINUED | OUTPATIENT
Start: 2019-01-21 | End: 2019-01-21 | Stop reason: SURG

## 2019-01-21 RX ORDER — FENTANYL CITRATE 50 UG/ML
25 INJECTION, SOLUTION INTRAMUSCULAR; INTRAVENOUS AS NEEDED
Status: DISCONTINUED | OUTPATIENT
Start: 2019-01-21 | End: 2019-01-21 | Stop reason: HOSPADM

## 2019-01-21 RX ORDER — OXYCODONE AND ACETAMINOPHEN 10; 325 MG/1; MG/1
1 TABLET ORAL ONCE AS NEEDED
Status: DISCONTINUED | OUTPATIENT
Start: 2019-01-21 | End: 2019-01-21 | Stop reason: HOSPADM

## 2019-01-21 RX ORDER — IPRATROPIUM BROMIDE AND ALBUTEROL SULFATE 2.5; .5 MG/3ML; MG/3ML
3 SOLUTION RESPIRATORY (INHALATION) ONCE
Status: COMPLETED | OUTPATIENT
Start: 2019-01-21 | End: 2019-01-21

## 2019-01-21 RX ORDER — OXYCODONE HYDROCHLORIDE AND ACETAMINOPHEN 5; 325 MG/1; MG/1
1 TABLET ORAL ONCE AS NEEDED
Status: DISCONTINUED | OUTPATIENT
Start: 2019-01-21 | End: 2019-01-21 | Stop reason: HOSPADM

## 2019-01-21 RX ORDER — HYDROCODONE BITARTRATE AND ACETAMINOPHEN 5; 325 MG/1; MG/1
1 TABLET ORAL EVERY 4 HOURS PRN
Qty: 18 TABLET | Refills: 0 | Status: SHIPPED | OUTPATIENT
Start: 2019-01-21 | End: 2019-05-16

## 2019-01-21 RX ORDER — SUFENTANIL CITRATE 50 UG/ML
INJECTION EPIDURAL; INTRAVENOUS AS NEEDED
Status: DISCONTINUED | OUTPATIENT
Start: 2019-01-21 | End: 2019-01-21 | Stop reason: SURG

## 2019-01-21 RX ORDER — ONDANSETRON 2 MG/ML
4 INJECTION INTRAMUSCULAR; INTRAVENOUS ONCE AS NEEDED
Status: DISCONTINUED | OUTPATIENT
Start: 2019-01-21 | End: 2019-01-21 | Stop reason: HOSPADM

## 2019-01-21 RX ORDER — LIDOCAINE HYDROCHLORIDE 20 MG/ML
INJECTION, SOLUTION INFILTRATION; PERINEURAL AS NEEDED
Status: DISCONTINUED | OUTPATIENT
Start: 2019-01-21 | End: 2019-01-21 | Stop reason: SURG

## 2019-01-21 RX ORDER — SODIUM CHLORIDE 0.9 % (FLUSH) 0.9 %
1-10 SYRINGE (ML) INJECTION AS NEEDED
Status: DISCONTINUED | OUTPATIENT
Start: 2019-01-21 | End: 2019-01-21 | Stop reason: HOSPADM

## 2019-01-21 RX ORDER — NALOXONE HCL 0.4 MG/ML
0.4 VIAL (ML) INJECTION AS NEEDED
Status: DISCONTINUED | OUTPATIENT
Start: 2019-01-21 | End: 2019-01-21 | Stop reason: HOSPADM

## 2019-01-21 RX ORDER — PROPOFOL 10 MG/ML
VIAL (ML) INTRAVENOUS AS NEEDED
Status: DISCONTINUED | OUTPATIENT
Start: 2019-01-21 | End: 2019-01-21 | Stop reason: SURG

## 2019-01-21 RX ORDER — ACETAMINOPHEN 500 MG
1000 TABLET ORAL ONCE
Status: COMPLETED | OUTPATIENT
Start: 2019-01-21 | End: 2019-01-21

## 2019-01-21 RX ADMIN — ONDANSETRON HYDROCHLORIDE 4 MG: 2 SOLUTION INTRAMUSCULAR; INTRAVENOUS at 14:15

## 2019-01-21 RX ADMIN — DEXAMETHASONE SODIUM PHOSPHATE 4 MG: 4 INJECTION, SOLUTION INTRAMUSCULAR; INTRAVENOUS at 14:15

## 2019-01-21 RX ADMIN — SUCCINYLCHOLINE CHLORIDE 100 MG: 20 INJECTION, SOLUTION INTRAMUSCULAR; INTRAVENOUS at 13:43

## 2019-01-21 RX ADMIN — LEVOFLOXACIN 500 MG: 500 INJECTION, SOLUTION INTRAVENOUS at 13:43

## 2019-01-21 RX ADMIN — LIDOCAINE HYDROCHLORIDE 1 EACH: 40 SOLUTION TOPICAL at 13:43

## 2019-01-21 RX ADMIN — PROPOFOL 80 MG: 10 INJECTION, EMULSION INTRAVENOUS at 13:43

## 2019-01-21 RX ADMIN — ROCURONIUM BROMIDE 5 MG: 10 INJECTION INTRAVENOUS at 13:43

## 2019-01-21 RX ADMIN — LEVOFLOXACIN 500 MG: 5 INJECTION, SOLUTION INTRAVENOUS at 12:26

## 2019-01-21 RX ADMIN — ACETAMINOPHEN 1000 MG: 500 TABLET, FILM COATED ORAL at 12:41

## 2019-01-21 RX ADMIN — SUFENTANIL CITRATE 20 MCG: 50 INJECTION, SOLUTION EPIDURAL; INTRAVENOUS at 13:43

## 2019-01-21 RX ADMIN — LIDOCAINE HYDROCHLORIDE 0.5 ML: 10 INJECTION, SOLUTION EPIDURAL; INFILTRATION; INTRACAUDAL; PERINEURAL at 11:00

## 2019-01-21 RX ADMIN — IPRATROPIUM BROMIDE AND ALBUTEROL SULFATE 3 ML: 2.5; .5 SOLUTION RESPIRATORY (INHALATION) at 12:41

## 2019-01-21 RX ADMIN — IPRATROPIUM BROMIDE AND ALBUTEROL SULFATE 3 ML: 2.5; .5 SOLUTION RESPIRATORY (INHALATION) at 14:42

## 2019-01-21 RX ADMIN — SODIUM CHLORIDE, POTASSIUM CHLORIDE, SODIUM LACTATE AND CALCIUM CHLORIDE 1000 ML: 600; 310; 30; 20 INJECTION, SOLUTION INTRAVENOUS at 11:00

## 2019-01-21 RX ADMIN — LIDOCAINE HYDROCHLORIDE 100 MG: 20 INJECTION, SOLUTION INFILTRATION; PERINEURAL at 13:43

## 2019-01-21 RX ADMIN — VASOPRESSIN 2 UNITS: 20 INJECTION INTRAVENOUS at 13:43

## 2019-01-21 NOTE — OP NOTE
CYSTOSCOPY TRANSURETHRAL RESECTION OF BLADDER TUMOR  Procedure Note    Elder Mina  1/21/2019    Pre-op Diagnosis:   Malignant neoplasm of urinary bladder, unspecified site (CMS/HCC) [C67.9]    Post-op Diagnosis:     Post-Op Diagnosis Codes:     * Malignant neoplasm of urinary bladder, unspecified site (CMS/HCC) [C67.9]    Procedure/CPT® Codes:      Procedure(s):  CYSTOSCOPY TRANSURETHRAL RESECTION OF BLADDER TUMOR 2-5 cm     Surgeon(s):  Paul Giang MD    Anesthesia: General    Staff:   Circulator: Priscilla Bailey RN  Scrub Person: Nelson Mari; Sonam Burciaga, CASPER    Indications for procedure:  Recurrent bladder tumor    Procedure details:  Patient identified in preoperative anesthesia care unit.  He is admitted back to the operating theater and after induction of general anesthetic a proper timeout was performed.  After all were in agreement of patient procedure patient was prepped and draped in normal sterile fashion.  A 22 Slovenian cystoscope was introduced per urethra.  Anterior and posterior urethra revealed no abnormal lesions.  Bilobar hypertrophy.  Cystoscopy revealed multiple areas of concern.  There is a frondular tumor in the posterior aspect the bladder less than 2 cm with 2 areas of significant erythema and abnormal appearance, one in the posterior aspect the bladder and the second mesh to the right ureteral orifice.  I began by resecting the posterior frondular tumor using the cold cup biopsy forceps and then used a large cautery Bugbee to cauterize this area.  The 2 other areas of concern were then resected using the cold cup biopsy.  Then used electrocautery to achieve hemostasis.  Total area of electrocautery 2-5 cm.  At this point, the bladder was emptied and refilled multiple times.  No evidence of any bleeding.  The bladder was then emptied and the procedure is ended.        Estimated Blood Loss: minimal    Specimens:                ID Type Source Tests Collected by Time    A : POSTERIOR BLADDER TUMOR #1 Tissue Urinary Bladder TISSUE PATHOLOGY EXAM Paul Giang MD 1/21/2019 1356   B : AREA  ABOVE RIGHT UO Tissue Urinary Bladder TISSUE PATHOLOGY EXAM Paul Giang MD 1/21/2019 1356   C : POSTERIOR BLADDER TUMOR #2 Tissue Urinary Bladder TISSUE PATHOLOGY EXAM Paul Giang MD 1/21/2019 1356         Drains:      Complications: none    Follow up:   2 weeks pathology review    Paul Giang MD     Date: 1/21/2019  Time: 2:23 PM

## 2019-01-21 NOTE — DISCHARGE INSTRUCTIONS

## 2019-01-21 NOTE — ANESTHESIA PREPROCEDURE EVALUATION
Anesthesia Evaluation     Patient summary reviewed and Nursing notes reviewed   no history of anesthetic complications:  NPO Solid Status: > 8 hours  NPO Liquid Status: > 8 hours           Airway   Mallampati: I  TM distance: >3 FB  Neck ROM: full  No difficulty expected  Dental    (+) edentulous    Pulmonary     breath sounds clear to auscultation  (+) a smoker Current, COPD,   Cardiovascular   Exercise tolerance: poor (<4 METS)    ECG reviewed  Rhythm: regular  Rate: normal        Neuro/Psych  (+) psychiatric history Anxiety,     (-) seizures, TIA, CVA  GI/Hepatic/Renal/Endo    (+)   liver disease,   (-) no renal disease, diabetes    Musculoskeletal     Abdominal    Substance History   (+) alcohol use (now 2 beers/day, prior to a couple weeks ago was 9 daily),      OB/GYN          Other      history of cancer (bladder cancer) active                      Anesthesia Plan    ASA 3     general   (Spo2 91-92% on room air. Pt denies any new respiratory complaints. Lungs CTA. Will pre tx with duoneb. )  intravenous induction   Anesthetic plan, all risks, benefits, and alternatives have been provided, discussed and informed consent has been obtained with: patient.

## 2019-01-21 NOTE — ANESTHESIA POSTPROCEDURE EVALUATION
Patient: Elder Mina    Procedure Summary     Date:  01/21/19 Room / Location:   [facility] /  [facility]    Anesthesia Start:  1340 Anesthesia Stop:  1427    Procedure:  CYSTOSCOPY TRANSURETHRAL RESECTION OF BLADDER TUMOR (N/A Bladder) Diagnosis:       Malignant neoplasm of urinary bladder, unspecified site (CMS/HCC)      (Malignant neoplasm of urinary bladder, unspecified site (CMS/HCC) [C67.9])    Surgeon:  Paul Giang MD Provider:  Fred Ford CRNA    Anesthesia Type:  general ASA Status:  3          Anesthesia Type: general  Last vitals  BP   90/51 (01/21/19 1615)   Temp   97.7 °F (36.5 °C) (01/21/19 1500)   Pulse   76 (01/21/19 1615)   Resp   16 (01/21/19 1545)     SpO2   92 % (01/21/19 1615)     Post Anesthesia Care and Evaluation    PONV Status: none  Comments: Patient d/c from PACU prior to anes eval based on Arielle score.  Please see RN notes for details of d/c criteria.    Blood pressure 90/51, pulse 76, temperature 97.7 °F (36.5 °C), temperature source Temporal, resp. rate 16, SpO2 92 %.

## 2019-01-25 LAB
CYTO UR: NORMAL
LAB AP CASE REPORT: NORMAL
LAB AP INTRADEPARTMENTAL CONSULT: NORMAL
PATH REPORT.FINAL DX SPEC: NORMAL
PATH REPORT.GROSS SPEC: NORMAL

## 2019-01-28 ENCOUNTER — TELEPHONE (OUTPATIENT)
Dept: UROLOGY | Facility: CLINIC | Age: 69
End: 2019-01-28

## 2019-01-28 NOTE — TELEPHONE ENCOUNTER
"It is not abnormal to have hematuria post TURBT. The tissue he is passing is \"scab\" like tissue which is normal post TURBT.  If the patient starts having trouble urinating due to the clots being passed he will need to go to the ER since Dr. Giang is out of the office all week. If pt has any fever he will need to go to the ER.   "

## 2019-01-28 NOTE — TELEPHONE ENCOUNTER
Called pt and gave instructions to him when he would need to go to ER and explained that the hematuria he is experiencing is normal post op; verified understanding.

## 2019-01-28 NOTE — TELEPHONE ENCOUNTER
Patient of Dr Giang post bladder resection 1/21/19. He states that over the weekend he started having increased hematuria. He said he passed some pieces of tissue about the size of a quarter on Saturday and the hematuria worsened. He denies fever or other symptoms. Please advise.

## 2019-02-05 ENCOUNTER — OFFICE VISIT (OUTPATIENT)
Dept: UROLOGY | Facility: CLINIC | Age: 69
End: 2019-02-05

## 2019-02-05 VITALS
DIASTOLIC BLOOD PRESSURE: 70 MMHG | TEMPERATURE: 97.2 F | SYSTOLIC BLOOD PRESSURE: 108 MMHG | HEIGHT: 70 IN | WEIGHT: 157 LBS | BODY MASS INDEX: 22.48 KG/M2

## 2019-02-05 DIAGNOSIS — C67.9 MALIGNANT NEOPLASM OF URINARY BLADDER, UNSPECIFIED SITE (HCC): Primary | ICD-10-CM

## 2019-02-05 LAB
BILIRUB BLD-MCNC: NEGATIVE MG/DL
CLARITY, POC: CLEAR
COLOR UR: YELLOW
GLUCOSE UR STRIP-MCNC: NEGATIVE MG/DL
KETONES UR QL: NEGATIVE
LEUKOCYTE EST, POC: ABNORMAL
NITRITE UR-MCNC: NEGATIVE MG/ML
PH UR: 5.5 [PH] (ref 5–8)
PROT UR STRIP-MCNC: ABNORMAL MG/DL
RBC # UR STRIP: ABNORMAL /UL
SP GR UR: 1.02 (ref 1–1.03)
UROBILINOGEN UR QL: NORMAL

## 2019-02-05 PROCEDURE — 99212 OFFICE O/P EST SF 10 MIN: CPT | Performed by: UROLOGY

## 2019-02-05 PROCEDURE — 81003 URINALYSIS AUTO W/O SCOPE: CPT | Performed by: UROLOGY

## 2019-02-05 NOTE — PROGRESS NOTES
Mr. Mina is 68 y.o. 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 This is a recurrence of a previous diagnosis diagnosis.. The patient was initially diagnosed several month(s) ago. Severity is best is explained as carcinoma in situ. Previous management includes TURBT. Symptoms include obstructive symptoms including weak stream.  Last upper tract imaging was recently.             The following portions of the patient's history were reviewed and updated as appropriate: allergies, current medications, past family history, past medical history, past social history, past surgical history and problem list.    Review of Systems   Constitutional: Negative for chills and fever.   Gastrointestinal: Negative for abdominal pain, anal bleeding and blood in stool.   Genitourinary: Positive for difficulty urinating (incomp emptying), dysuria, hematuria and urgency. Negative for decreased urine volume, discharge, enuresis, flank pain, frequency, genital sores, penile pain, penile swelling, scrotal swelling and testicular pain.       I have reviewed the review of systems      Current Outpatient Medications:   •  ALPRAZolam (XANAX) 1 MG tablet, Take 1 mg by mouth 2 (Two) Times a Day As Needed for Anxiety., Disp: , Rfl:   •  budesonide-formoterol (SYMBICORT) 160-4.5 MCG/ACT inhaler, Inhale 2 puffs Daily., Disp: , Rfl:   •  HYDROcodone-acetaminophen (NORCO) 5-325 MG per tablet, Take 1 tablet by mouth Every 4 (Four) Hours As Needed for Moderate Pain ., Disp: 18 tablet, Rfl: 0  •  ipratropium-albuterol (COMBIVENT RESPIMAT)  MCG/ACT inhaler, Inhale 1 puff 4 (Four) Times a Day As Needed for Wheezing., Disp: , Rfl:   •  traZODone (DESYREL) 100 MG tablet, Take 100 mg by mouth Every Night., Disp: , Rfl:     Past Medical History:   Diagnosis Date   • Anxiety    • Cancer (CMS/HCC)     bladder - karla  "dr trotter   • Cataracts, bilateral    • Cirrhosis (CMS/HCC)    • COPD (chronic obstructive pulmonary disease) (CMS/HCC)    • Glaucoma    • Hematuria    • Liver cancer (CMS/HCC)    • Urinary retention        Past Surgical History:   Procedure Laterality Date   • CYSTOSCOPY     • ENDOSCOPY N/A 9/20/2018    Procedure: ESOPHAGOGASTRODUODENOSCOPY WITH ANESTHESIA;  Surgeon: Sanford Barahona DO;  Location: [facility] ENDOSCOPY;  Service: Gastroenterology   • EXPLORATORY LAPAROTOMY     • LIVER SURGERY  12/06/2018    went up through groin to cancerous liver nodule and cut off its blood supply and injected chemo into it   • TRANSURETHRAL RESECTION OF BLADDER TUMOR N/A 9/28/2018    Procedure: CYSTOSCOPY TRANSURETHRAL RESECTION OF BLADDER TUMOR;  Surgeon: Paul Giang MD;  Location:  [facility] OR;  Service: Urology   • TRANSURETHRAL RESECTION OF BLADDER TUMOR N/A 1/21/2019    Procedure: CYSTOSCOPY TRANSURETHRAL RESECTION OF BLADDER TUMOR;  Surgeon: Paul Giang MD;  Location:  [facility] OR;  Service: Urology       Social History     Socioeconomic History   • Marital status:      Spouse name: Not on file   • Number of children: Not on file   • Years of education: Not on file   • Highest education level: Not on file   Tobacco Use   • Smoking status: Current Every Day Smoker     Packs/day: 1.00     Years: 40.00     Pack years: 40.00   • Smokeless tobacco: Never Used   Substance and Sexual Activity   • Alcohol use: No     Frequency: Never   • Drug use: No   • Sexual activity: Defer       Family History   Problem Relation Age of Onset   • Colon cancer Father        Objective    /70   Temp 97.2 °F (36.2 °C)   Ht 177 cm (69.69\")   Wt 71.2 kg (157 lb)   BMI 22.73 kg/m²     Physical Exam    Admission on 01/21/2019, Discharged on 01/21/2019   Component Date Value Ref Range Status   • Case Report 01/21/2019    Final                    Value:Surgical Pathology Report                         Case: [id number]          "                         Authorizing Provider:  Paul Giang MD    Collected:           01/21/2019 01:56 PM          Ordering Location:     [facility] OR  Received:            01/22/2019 09:09 AM          Pathologist:           Maude Goodrich MD                                                         Specimens:   1) - Urinary Bladder, POSTERIOR BLADDER TUMOR #1                                                    2) - Urinary Bladder, AREA  ABOVE RIGHT UO                                                          3) - Urinary Bladder, POSTERIOR BLADDER TUMOR #2                                          • Final Diagnosis 01/21/2019    Final                    Value:This result contains rich text formatting which cannot be displayed here.   • Intradepartmental Consult 01/21/2019    Final                    Value:This result contains rich text formatting which cannot be displayed here.   • Gross Description 01/21/2019    Final                    Value:This result contains rich text formatting which cannot be displayed here.   • Microscopic Description 01/21/2019    Final                    Value:This result contains rich text formatting which cannot be displayed here.       Results for orders placed or performed in visit on 02/05/19   POC Urinalysis Dipstick, Multipro   Result Value Ref Range    Color Yellow Yellow, Straw, Dark Yellow, Gabrielle    Clarity, UA Clear Clear    Glucose, UA Negative Negative, 1000 mg/dL (3+) mg/dL    Bilirubin Negative Negative    Ketones, UA Negative Negative    Specific Gravity  1.025 1.005 - 1.030    Blood, UA Large (A) Negative    pH, Urine 5.5 5.0 - 8.0    Protein, POC 30 mg/dL (A) Negative mg/dL    Urobilinogen, UA Normal Normal    Nitrite, UA Negative Negative    Leukocytes Small (1+) (A) Negative     Patient's Body mass index is 22.73 kg/m². BMI is within normal parameters. No follow-up required..      Assessment and Plan    Elder was seen today for bladder  cancer.    Diagnoses and all orders for this visit:    Malignant neoplasm of urinary bladder, unspecified site (CMS/HCC)  -     POC Urinalysis Dipstick, Multipro    I reviewed his pathology.  To the 3 lesions were high-grade superficial cancers, third lesion biopsied was carcinoma in situ.  I did discuss with both him and his wife the aggressive nature of carcinoma in situ.  Atypical recommendation for carcinoma in situ is starting BCG.  Unfortunately, within the last few months patient has been diagnosed with hepatocellular carcinoma and is on radiation and chemotherapy for this.  I discussed with him that I have 2 concerns specifically for the use of BCG in treating his bladder cancer.  #1, BCG works to Insight and immune response within the bladder and he is immune compromised due to the chemotherapy.  In addition, he would be at a much higher risk for BCG sepsis due to his immunocompromise state.  As such I do think the risk of BCG much outweighs the benefit.  We did discuss BCG in this setting, and both he and his wife agree that he would like to continue with just monitoring.  I will see him back in 3 months with cystoscopy

## 2019-02-05 NOTE — PATIENT INSTRUCTIONS

## 2019-05-07 ENCOUNTER — PROCEDURE VISIT (OUTPATIENT)
Dept: UROLOGY | Facility: CLINIC | Age: 69
End: 2019-05-07

## 2019-05-07 DIAGNOSIS — C67.9 MALIGNANT NEOPLASM OF URINARY BLADDER, UNSPECIFIED SITE (HCC): Primary | ICD-10-CM

## 2019-05-07 LAB
BILIRUB BLD-MCNC: NEGATIVE MG/DL
CLARITY, POC: CLEAR
COLOR UR: YELLOW
GLUCOSE UR STRIP-MCNC: NEGATIVE MG/DL
KETONES UR QL: NEGATIVE
LEUKOCYTE EST, POC: NEGATIVE
NITRITE UR-MCNC: NEGATIVE MG/ML
PH UR: 5.5 [PH] (ref 5–8)
PROT UR STRIP-MCNC: ABNORMAL MG/DL
RBC # UR STRIP: ABNORMAL /UL
SP GR UR: 1.02 (ref 1–1.03)
UROBILINOGEN UR QL: NORMAL

## 2019-05-07 PROCEDURE — 99213 OFFICE O/P EST LOW 20 MIN: CPT | Performed by: UROLOGY

## 2019-05-07 PROCEDURE — 81003 URINALYSIS AUTO W/O SCOPE: CPT | Performed by: UROLOGY

## 2019-05-07 PROCEDURE — 52000 CYSTOURETHROSCOPY: CPT | Performed by: UROLOGY

## 2019-05-07 NOTE — PROGRESS NOTES
Pre- operative diagnosis:  Bladder cancer surveillance    Post operative diagnosis:  Bladder Tumor    Procedure:  The patient was prepped and draped in a normal sterile fashion.  The urethra was anesthetized with 2% lidocaine jelly.  A flexible cystoscope was introduced per urethra.      Urethra:  Normal    Bladder:  Erythematous patch with raised lesion on the right aspect of the bladder superior to the right ureteral orifice.  There is a frondular raised lesion on the left aspect of the bladder above the left ureteral orifice as well    Ureteral orifices:  Normal position bilaterally    Prostate:  normal    Patient tolerated the procedure well    Complications: none    Blood loss: minimal    Mr. Mina is 68 y.o. male    Chief Complaint   Patient presents with   • Bladder Cancer       History of Present Illness  History of bladder cancer diagnosed several years ago.  He has had multiple recurrence since then.  He has a history of hepatocellular carcinoma treated with radiation and chemotherapy.      The following portions of the patient's history were reviewed and updated as appropriate: allergies, current medications, past family history, past medical history, past social history, past surgical history and problem list.    Review of Systems   Constitutional: Negative for chills and fever.   Gastrointestinal: Negative for abdominal pain, anal bleeding and blood in stool.   Genitourinary: Positive for difficulty urinating (incomp emptying), dysuria, hematuria and urgency. Negative for decreased urine volume, discharge, enuresis, flank pain, frequency, genital sores, penile pain, penile swelling, scrotal swelling and testicular pain.       I have reviewed the review of systems      Current Outpatient Medications:   •  ALPRAZolam (XANAX) 1 MG tablet, Take 1 mg by mouth 2 (Two) Times a Day As Needed for Anxiety., Disp: , Rfl:   •  budesonide-formoterol (SYMBICORT) 160-4.5 MCG/ACT inhaler, Inhale 2 puffs Daily., Disp:  , Rfl:   •  HYDROcodone-acetaminophen (NORCO) 5-325 MG per tablet, Take 1 tablet by mouth Every 4 (Four) Hours As Needed for Moderate Pain ., Disp: 18 tablet, Rfl: 0  •  ipratropium-albuterol (COMBIVENT RESPIMAT)  MCG/ACT inhaler, Inhale 1 puff 4 (Four) Times a Day As Needed for Wheezing., Disp: , Rfl:   •  traZODone (DESYREL) 100 MG tablet, Take 100 mg by mouth Every Night., Disp: , Rfl:     Past Medical History:   Diagnosis Date   • Anxiety    • Cancer (CMS/HCC)     bladder - karla dr trotter   • Cataracts, bilateral    • Cirrhosis (CMS/HCC)    • COPD (chronic obstructive pulmonary disease) (CMS/HCC)    • Glaucoma    • Hematuria    • Liver cancer (CMS/HCC)    • Urinary retention        Past Surgical History:   Procedure Laterality Date   • CYSTOSCOPY     • ENDOSCOPY N/A 9/20/2018    Procedure: ESOPHAGOGASTRODUODENOSCOPY WITH ANESTHESIA;  Surgeon: Sanford Barahona DO;  Location: [facility] ENDOSCOPY;  Service: Gastroenterology   • EXPLORATORY LAPAROTOMY     • LIVER SURGERY  12/06/2018    went up through groin to cancerous liver nodule and cut off its blood supply and injected chemo into it   • TRANSURETHRAL RESECTION OF BLADDER TUMOR N/A 9/28/2018    Procedure: CYSTOSCOPY TRANSURETHRAL RESECTION OF BLADDER TUMOR;  Surgeon: Paul Giang MD;  Location: [facility] OR;  Service: Urology   • TRANSURETHRAL RESECTION OF BLADDER TUMOR N/A 1/21/2019    Procedure: CYSTOSCOPY TRANSURETHRAL RESECTION OF BLADDER TUMOR;  Surgeon: Paul Giang MD;  Location: [facility] OR;  Service: Urology       Social History     Socioeconomic History   • Marital status:      Spouse name: Not on file   • Number of children: Not on file   • Years of education: Not on file   • Highest education level: Not on file   Tobacco Use   • Smoking status: Current Every Day Smoker     Packs/day: 1.00     Years: 40.00     Pack years: 40.00   • Smokeless tobacco: Never Used   Substance and Sexual Activity   • Alcohol use: No     Frequency:  Never   • Drug use: No   • Sexual activity: Defer       Family History   Problem Relation Age of Onset   • Colon cancer Father        Objective    There were no vitals taken for this visit.    Physical Exam   Constitutional: He is oriented to person, place, and time. He appears well-developed and well-nourished. No distress.   Pulmonary/Chest: Effort normal.   Abdominal: Soft. He exhibits no distension and no mass. There is no tenderness. There is no rebound and no guarding. No hernia.   Neurological: He is alert and oriented to person, place, and time.   Skin: Skin is warm and dry. He is not diaphoretic.   Psychiatric: He has a normal mood and affect.   Vitals reviewed.      Office Visit on 02/05/2019   Component Date Value Ref Range Status   • Color 02/05/2019 Yellow  Yellow, Straw, Dark Yellow, Gabrielle Final   • Clarity, UA 02/05/2019 Clear  Clear Final   • Glucose, UA 02/05/2019 Negative  Negative, 1000 mg/dL (3+) mg/dL Final   • Bilirubin 02/05/2019 Negative  Negative Final   • Ketones, UA 02/05/2019 Negative  Negative Final   • Specific Gravity  02/05/2019 1.025  1.005 - 1.030 Final   • Blood, UA 02/05/2019 Large* Negative Final   • pH, Urine 02/05/2019 5.5  5.0 - 8.0 Final   • Protein, POC 02/05/2019 30 mg/dL* Negative mg/dL Final   • Urobilinogen, UA 02/05/2019 Normal  Normal Final   • Nitrite, UA 02/05/2019 Negative  Negative Final   • Leukocytes 02/05/2019 Small (1+)* Negative Final       Results for orders placed or performed in visit on 05/07/19   POC Urinalysis Dipstick, Multipro   Result Value Ref Range    Color Yellow Yellow, Straw, Dark Yellow, Gabrielle    Clarity, UA Clear Clear    Glucose, UA Negative Negative, 1000 mg/dL (3+) mg/dL    Bilirubin Negative Negative    Ketones, UA Negative Negative    Specific Gravity  1.025 1.005 - 1.030    Blood, UA Large (A) Negative    pH, Urine 5.5 5.0 - 8.0    Protein, POC 30 mg/dL (A) Negative mg/dL    Urobilinogen, UA Normal Normal    Nitrite, UA Negative Negative     Leukocytes Negative Negative     Assessment and Plan    Elder was seen today for bladder cancer.    Diagnoses and all orders for this visit:    Malignant neoplasm of urinary bladder, unspecified site (CMS/HCC)  -     POC Urinalysis Dipstick, Multipro  -     Case Request; Standing  -     clindamycin (CLEOCIN) 900 mg in dextrose (D5W) 5 % 100 mL IVPB  -     Case Request    Other orders  -     Follow Anesthesia Guidelines / Standing Orders; Future  -     Obtain informed consent  -     Provide NPO Instructions to Patient; Future  -     Follow Anesthesia Guidelines / Standing Orders; Standing  -     Verify NPO Status; Standing  -     Verify / Perform Chlorhexidine Skin Prep if Indicated (If Not Already Completed); Standing      New finding on cystoscopy of recurrent bladder cancer.  He has 2 lesions that need to be evaluated by transurethral resection of the bladder tumor.  In total this will likely be a medium sized resection.  I discussed these findings with the patient and have given him alternatives and he has chosen to proceed with transurethral resection of bladder tumor.  He does understand the risk of this including but not limited to bleeding, infection, bladder perforation requiring open repair or prolonged catheterization, urethral strictures, damage to any other organs.    Patient does have a history of hepatocellular carcinoma treated with radiation and chemotherapy.  Previously he was not started on BCG because of this.  He has since stopped the chemotherapy which apparently was a single dose regimen.  Per patient the liver cancer is under control at this point.  I think after this resection depending on the pathology he may benefit from BCG moving forward.  He did get a CT scan done in [place] several months ago and I would like to get a report of this and I have requested this today.  25 modifier added to this cystoscopy due to the finding of bladder cancer and discussion of risk benefits and  procedures.

## 2019-05-16 ENCOUNTER — APPOINTMENT (OUTPATIENT)
Dept: PREADMISSION TESTING | Facility: HOSPITAL | Age: 69
End: 2019-05-16

## 2019-05-16 ENCOUNTER — TELEPHONE (OUTPATIENT)
Dept: UROLOGY | Facility: CLINIC | Age: 69
End: 2019-05-16

## 2019-05-16 VITALS
HEIGHT: 70 IN | DIASTOLIC BLOOD PRESSURE: 56 MMHG | OXYGEN SATURATION: 90 % | SYSTOLIC BLOOD PRESSURE: 119 MMHG | WEIGHT: 156.31 LBS | HEART RATE: 94 BPM | RESPIRATION RATE: 20 BRPM | BODY MASS INDEX: 22.38 KG/M2

## 2019-05-16 LAB
ANION GAP SERPL CALCULATED.3IONS-SCNC: 7 MMOL/L (ref 4–13)
BUN BLD-MCNC: 23 MG/DL (ref 5–21)
BUN/CREAT SERPL: 35.4 (ref 7–25)
CALCIUM SPEC-SCNC: 9.7 MG/DL (ref 8.4–10.4)
CHLORIDE SERPL-SCNC: 101 MMOL/L (ref 98–110)
CO2 SERPL-SCNC: 32 MMOL/L (ref 24–31)
CREAT BLD-MCNC: 0.65 MG/DL (ref 0.5–1.4)
DEPRECATED RDW RBC AUTO: 51 FL (ref 40–54)
ERYTHROCYTE [DISTWIDTH] IN BLOOD BY AUTOMATED COUNT: 14 % (ref 12–15)
GFR SERPL CREATININE-BSD FRML MDRD: 122 ML/MIN/1.73
GLUCOSE BLD-MCNC: 85 MG/DL (ref 70–100)
HCT VFR BLD AUTO: 42.4 % (ref 40–52)
HGB BLD-MCNC: 14.4 G/DL (ref 14–18)
MCH RBC QN AUTO: 33.3 PG (ref 28–32)
MCHC RBC AUTO-ENTMCNC: 34 G/DL (ref 33–36)
MCV RBC AUTO: 98.1 FL (ref 82–95)
PLATELET # BLD AUTO: 98 10*3/MM3 (ref 130–400)
PMV BLD AUTO: 10.6 FL (ref 6–12)
POTASSIUM BLD-SCNC: 4.2 MMOL/L (ref 3.5–5.3)
RBC # BLD AUTO: 4.32 10*6/MM3 (ref 4.8–5.9)
SODIUM BLD-SCNC: 140 MMOL/L (ref 135–145)
WBC NRBC COR # BLD: 7.13 10*3/MM3 (ref 4.8–10.8)

## 2019-05-16 PROCEDURE — 93010 ELECTROCARDIOGRAM REPORT: CPT | Performed by: INTERNAL MEDICINE

## 2019-05-16 PROCEDURE — 36415 COLL VENOUS BLD VENIPUNCTURE: CPT

## 2019-05-16 PROCEDURE — 85027 COMPLETE CBC AUTOMATED: CPT | Performed by: UROLOGY

## 2019-05-16 PROCEDURE — 93005 ELECTROCARDIOGRAM TRACING: CPT

## 2019-05-16 PROCEDURE — 80048 BASIC METABOLIC PNL TOTAL CA: CPT | Performed by: UROLOGY

## 2019-05-16 NOTE — TELEPHONE ENCOUNTER
Called and informed pt of surgery change. Moved surgery to Monday 5/20 arrive at 6:00 am NPO after midnight. Pt confirmed understanding.

## 2019-05-16 NOTE — DISCHARGE INSTRUCTIONS
DAY OF SURGERY INSTRUCTIONS        YOUR SURGEON: Dr. Paul Giang    PROCEDURE: Cystoscopy transurethral resection of bladder tumor    DATE OF SURGERY: May 20, 2019    ARRIVAL TIME: AS DIRECTED BY OFFICE    YOU MAY TAKE THE FOLLOWING MEDICATION(S) THE MORNING OF SURGERY WITH A SIP OF WATER: Inhalers, xanax    ALL OTHER HOME MEDICATIONS CHECK WITH YOUR DOCTOR              MANAGING PAIN AFTER SURGERY    We know you are probably wondering what your pain will be like after surgery.  Following surgery it is unrealistic to expect you will not have pain.   Pain is how our bodies let us know that something is wrong or cautions us to be careful.  That said, our goal is to make your pain tolerable.    Methods we may use to treat your pain include (oral or IV medications, PCAs, epidurals, nerve blocks, etc.)   While some procedures require IV pain medications for a short time after surgery, transitioning to pain medications by mouth allows for better management of pain.   Your nurse will encourage you to take oral pain medications whenever possible.  IV medications work almost immediately, but only last a short while.  Taking medications by mouth allows for a more constant level of medication in your blood stream for a longer period of time.      Once your pain is out of control it is harder to get back under control.  It is important you are aware when your next dose of pain medication is due.  If you are admitted, your nurse may write the time of your next dose on the white board in your room to help you remember.      We are interested in your pain and encourage you to inform us about aggravating factors during your visit.   Many times a simple repositioning every few hours can make a big difference.    If your physician says it is okay, do not let your pain prevent you from getting out of bed. Be sure to call your nurse for assistance prior to getting up so you do not fall.      Before surgery, please decide your  tolerable pain goal.  These faces help describe the pain ratings we use on a 0-10 scale.   Be prepared to tell us your goal and whether or not you take pain or anxiety medications at home.            BEFORE YOU COME TO THE HOSPITAL  (Pre-op instructions)  • Do not eat, drink, smoke or chew gum after midnight the night before surgery.  This also includes no mints.  • Morning of surgery take only the medicines you have been instructed with a sip of water unless otherwise instructed  by your physician.  • Do not shave, wear makeup or dark nail polish.  • Remove all jewelry including rings.  • Leave anything you consider valuable at home.  • Leave your suitcase in the car until after your surgery.  • Bring the following with you if applicable:  o Picture ID and insurance, Medicare or Medicaid cards  o Co-pay/deductible required by insurance (cash, check, credit card)  o Copy of advance directive, living will or power-of- documents if not brought to PAT  o CPAP or BIPAP mask and tubing  o Relaxation aids (MP3 player, book, magazine)  • On the day of surgery check in at registration located at the main entrance of the hospital.       Outpatient Surgery Guidelines, Adult  Outpatient procedures are those for which the person having the procedure is allowed to go home the same day as the procedure. Various procedures are done on an outpatient basis. You should follow some general guidelines if you will be having an outpatient procedure.  LET YOUR HEALTH CARE PROVIDER KNOW ABOUT:  · Any allergies you have.  · All medicines you are taking, including vitamins, herbs, eye drops, creams, and over-the-counter medicines.  · Previous problems you or members of your family have had with the use of anesthetics.  · Any blood disorders you have.  · Previous surgeries you have had.  · Medical conditions you have.  RISKS AND COMPLICATIONS  Your health care provider will discuss possible risks and complications with you before  surgery. Common risks and complications include:    · Problems due to the use of anesthetics.  · Blood loss and replacement (does not apply to minor surgical procedures).  · Temporary increase in pain due to surgery.  · Uncorrected pain or problems that the surgery was meant to correct.  · Infection.  · New damage.  BEFORE THE PROCEDURE  · Ask your health care provider about changing or stopping your regular medicines. You may need to stop taking certain medicines in the days or weeks before the procedure.  · Stop smoking at least 2 weeks before surgery. This lowers your risk for complications during and after surgery. Ask your health care provider for help with this if needed.  · Eat your usual meals and a light supper the day before surgery. Continue fluid intake. Do not drink alcohol.  · Do not eat or drink after midnight the night before your surgery.   · Arrange for someone to take you home and to stay with you for 24 hours after the procedure. Medicine given for your procedure may affect your ability to drive or to care for yourself.  · Call your health care provider's office if you develop an illness or problem that may prevent you from safely having your procedure.  AFTER THE PROCEDURE  After surgery, you will be taken to a recovery area, where your progress will be monitored. If there are no complications, you will be allowed to go home when you are awake, stable, and taking fluids well. You may have numbness around the surgical site. Healing will take some time. You will have tenderness at the surgical site and may have some swelling and bruising. You may also have some nausea.  HOME CARE INSTRUCTIONS  · Do not drive for 24 hours, or as directed by your health care provider. Do not drive while taking prescription pain medicines.  · Do not drink alcohol for 24 hours.  · Do not make important decisions or sign legal documents for 24 hours.  · You may resume a normal diet and activities as directed.  · Do not  lift anything heavier than 10 pounds (4.5 kg) or play contact sports until your health care provider says it is okay.  · Change your bandages (dressings) as directed.  · Only take over-the-counter or prescription medicines as directed by your health care provider.  · Follow up with your health care provider as directed.  SEEK MEDICAL CARE IF:  · You have increased bleeding (more than a small spot) from the surgical site.  · You have redness, swelling, or increasing pain in the wound.  · You see pus coming from the wound.  · You have a fever.  · You notice a bad smell coming from the wound or dressing.  · You feel lightheaded or faint.  · You develop a rash.  · You have trouble breathing.  · You develop allergies.  MAKE SURE YOU:  · Understand these instructions.  · Will watch your condition.  · Will get help right away if you are not doing well or get worse.     This information is not intended to replace advice given to you by your health care provider. Make sure you discuss any questions you have with your health care provider.     Document Released: 09/12/2002 Document Revised: 05/03/2016 Document Reviewed: 05/22/2014  [employer] Interactive Patient Education ©2016 [employer]       Fall Prevention in Hospitals, Adult  As a hospital patient, your condition and the treatments you receive can increase your risk for falls. Some additional risk factors for falls in a hospital include:  · Being in an unfamiliar environment.  · Being on bed rest.  · Your surgery.  · Taking certain medicines.  · Your tubing requirements, such as intravenous (IV) therapy or catheters.  It is important that you learn how to decrease fall risks while at the hospital. Below are important tips that can help prevent falls.  SAFETY TIPS FOR PREVENTING FALLS  Talk about your risk of falling.  · Ask your health care provider why you are at risk for falling. Is it your medicine, illness, tubing placement, or something else?  · Make a plan with  your health care provider to keep you safe from falls.  · Ask your health care provider or pharmacist about side effects of your medicines. Some medicines can make you dizzy or affect your coordination.  Ask for help.  · Ask for help before getting out of bed. You may need to press your call button.  · Ask for assistance in getting safely to the toilet.  · Ask for a walker or cane to be put at your bedside. Ask that most of the side rails on your bed be placed up before your health care provider leaves the room.  · Ask family or friends to sit with you.  · Ask for things that are out of your reach, such as your glasses, hearing aids, telephone, bedside table, or call button.  Follow these tips to avoid falling:  · Stay lying or seated, rather than standing, while waiting for help.  · Wear rubber-soled slippers or shoes whenever you walk in the hospital.  · Avoid quick, sudden movements.  ¨ Change positions slowly.  ¨ Sit on the side of your bed before standing.  ¨ Stand up slowly and wait before you start to walk.  · Let your health care provider know if there is a spill on the floor.  · Pay careful attention to the medical equipment, electrical cords, and tubes around you.  · When you need help, use your call button by your bed or in the bathroom. Wait for one of your health care providers to help you.  · If you feel dizzy or unsure of your footing, return to bed and wait for assistance.  · Avoid being distracted by the TV, telephone, or another person in your room.  · Do not lean or support yourself on rolling objects, such as IV poles or bedside tables.     This information is not intended to replace advice given to you by your health care provider. Make sure you discuss any questions you have with your health care provider.     Document Released: 12/15/2001 Document Revised: 01/08/2016 Document Reviewed: 08/25/2013  [employer] Interactive Patient Education ©2016 [employer]       Surgical Site Infections  FAQs  What is a Surgical Site Infection (SSI)?  A surgical site infection is an infection that occurs after surgery in the part of the body where the surgery took place. Most patients who have surgery do not develop an infection. However, infections develop in about 1 to 3 out of every 100 patients who have surgery.  Some of the common symptoms of a surgical site infection are:  · Redness and pain around the area where you had surgery  · Drainage of cloudy fluid from your surgical wound  · Fever  Can SSIs be treated?  Yes. Most surgical site infections can be treated with antibiotics. The antibiotic given to you depends on the bacteria (germs) causing the infection. Sometimes patients with SSIs also need another surgery to treat the infection.  What are some of the things that hospitals are doing to prevent SSIs?  To prevent SSIs, doctors, nurses, and other healthcare providers:  · Clean their hands and arms up to their elbows with an antiseptic agent just before the surgery.  · Clean their hands with soap and water or an alcohol-based hand rub before and after caring for each patient.  · May remove some of your hair immediately before your surgery using electric clippers if the hair is in the same area where the procedure will occur. They should not shave you with a razor.  · Wear special hair covers, masks, gowns, and gloves during surgery to keep the surgery area clean.  · Give you antibiotics before your surgery starts. In most cases, you should get antibiotics within 60 minutes before the surgery starts and the antibiotics should be stopped within 24 hours after surgery.  · Clean the skin at the site of your surgery with a special soap that kills germs.  What can I do to help prevent SSIs?  Before your surgery:  · Tell your doctor about other medical problems you may have. Health problems such as allergies, diabetes, and obesity could affect your surgery and your treatment.  · Quit smoking. Patients who smoke  get more infections. Talk to your doctor about how you can quit before your surgery.  · Do not shave near where you will have surgery. Shaving with a razor can irritate your skin and make it easier to develop an infection.  At the time of your surgery:  · Speak up if someone tries to shave you with a razor before surgery. Ask why you need to be shaved and talk with your surgeon if you have any concerns.  · Ask if you will get antibiotics before surgery.  After your surgery:  · Make sure that your healthcare providers clean their hands before examining you, either with soap and water or an alcohol-based hand rub.  · If you do not see your providers clean their hands, please ask them to do so.  · Family and friends who visit you should not touch the surgical wound or dressings.  · Family and friends should clean their hands with soap and water or an alcohol-based hand rub before and after visiting you. If you do not see them clean their hands, ask them to clean their hands.  What do I need to do when I go home from the hospital?  · Before you go home, your doctor or nurse should explain everything you need to know about taking care of your wound. Make sure you understand how to care for your wound before you leave the hospital.  · Always clean your hands before and after caring for your wound.  · Before you go home, make sure you know who to contact if you have questions or problems after you get home.  · If you have any symptoms of an infection, such as redness and pain at the surgery site, drainage, or fever, call your doctor immediately.  If you have additional questions, please ask your doctor or nurse.  Developed and co-sponsored by The Society for Healthcare Epidemiology of [place] (SHEA); Infectious Diseases Society of [place] (IDSA); American Hospital Association; Association for Professionals in Infection Control and Epidemiology (APIC); Centers for Disease Control and Prevention (CDC); and The Joint  Commission.     This information is not intended to replace advice given to you by your health care provider. Make sure you discuss any questions you have with your health care provider.     Document Released: 12/23/2014 Document Revised: 01/08/2016 Document Reviewed: 03/02/2016  [employer] Interactive Patient Education ©2016 [employer]     PATIENT/FAMILY/RESPONSIBLE PARTY VERBALIZES UNDERSTANDING OF ABOVE EDUCATION.  COPY OF PAIN SCALE GIVEN AND REVIEWED WITH VERBALIZED UNDERSTANDING.

## 2019-05-20 ENCOUNTER — ANESTHESIA (OUTPATIENT)
Dept: PERIOP | Facility: HOSPITAL | Age: 69
End: 2019-05-20

## 2019-05-20 ENCOUNTER — HOSPITAL ENCOUNTER (OUTPATIENT)
Facility: HOSPITAL | Age: 69
Setting detail: HOSPITAL OUTPATIENT SURGERY
Discharge: HOME | End: 2019-05-20
Attending: UROLOGY | Admitting: UROLOGY

## 2019-05-20 ENCOUNTER — ANESTHESIA EVENT (OUTPATIENT)
Dept: PERIOP | Facility: HOSPITAL | Age: 69
End: 2019-05-20

## 2019-05-20 VITALS
HEART RATE: 72 BPM | RESPIRATION RATE: 16 BRPM | TEMPERATURE: 97.6 F | SYSTOLIC BLOOD PRESSURE: 119 MMHG | DIASTOLIC BLOOD PRESSURE: 58 MMHG | OXYGEN SATURATION: 90 %

## 2019-05-20 DIAGNOSIS — C67.9 MALIGNANT NEOPLASM OF URINARY BLADDER, UNSPECIFIED SITE (HCC): ICD-10-CM

## 2019-05-20 PROCEDURE — 25010000002 PROPOFOL 10 MG/ML EMULSION: Performed by: NURSE ANESTHETIST, CERTIFIED REGISTERED

## 2019-05-20 PROCEDURE — 25010000002 FENTANYL CITRATE (PF) 100 MCG/2ML SOLUTION: Performed by: NURSE ANESTHETIST, CERTIFIED REGISTERED

## 2019-05-20 PROCEDURE — 88305 TISSUE EXAM BY PATHOLOGIST: CPT | Performed by: UROLOGY

## 2019-05-20 PROCEDURE — 52235 CYSTOSCOPY AND TREATMENT: CPT | Performed by: UROLOGY

## 2019-05-20 PROCEDURE — 25010000002 SUCCINYLCHOLINE PER 20 MG: Performed by: NURSE ANESTHETIST, CERTIFIED REGISTERED

## 2019-05-20 RX ORDER — OXYCODONE AND ACETAMINOPHEN 7.5; 325 MG/1; MG/1
2 TABLET ORAL EVERY 4 HOURS PRN
Status: DISCONTINUED | OUTPATIENT
Start: 2019-05-20 | End: 2019-05-20 | Stop reason: HOSPADM

## 2019-05-20 RX ORDER — FENTANYL CITRATE 50 UG/ML
INJECTION, SOLUTION INTRAMUSCULAR; INTRAVENOUS AS NEEDED
Status: DISCONTINUED | OUTPATIENT
Start: 2019-05-20 | End: 2019-05-20 | Stop reason: SURG

## 2019-05-20 RX ORDER — SODIUM CHLORIDE 0.9 % (FLUSH) 0.9 %
3 SYRINGE (ML) INJECTION EVERY 12 HOURS SCHEDULED
Status: DISCONTINUED | OUTPATIENT
Start: 2019-05-20 | End: 2019-05-20 | Stop reason: HOSPADM

## 2019-05-20 RX ORDER — MEPERIDINE HYDROCHLORIDE 25 MG/ML
12.5 INJECTION INTRAMUSCULAR; INTRAVENOUS; SUBCUTANEOUS
Status: DISCONTINUED | OUTPATIENT
Start: 2019-05-20 | End: 2019-05-20 | Stop reason: HOSPADM

## 2019-05-20 RX ORDER — LIDOCAINE HYDROCHLORIDE 20 MG/ML
INJECTION, SOLUTION INFILTRATION; PERINEURAL AS NEEDED
Status: DISCONTINUED | OUTPATIENT
Start: 2019-05-20 | End: 2019-05-20 | Stop reason: SURG

## 2019-05-20 RX ORDER — CLINDAMYCIN PHOSPHATE 900 MG/50ML
900 INJECTION INTRAVENOUS ONCE
Status: COMPLETED | OUTPATIENT
Start: 2019-05-20 | End: 2019-05-20

## 2019-05-20 RX ORDER — OXYCODONE HYDROCHLORIDE AND ACETAMINOPHEN 5; 325 MG/1; MG/1
1 TABLET ORAL EVERY 6 HOURS PRN
Qty: 12 TABLET | Refills: 0 | Status: SHIPPED | OUTPATIENT
Start: 2019-05-20 | End: 2019-10-31

## 2019-05-20 RX ORDER — PROPOFOL 10 MG/ML
VIAL (ML) INTRAVENOUS AS NEEDED
Status: DISCONTINUED | OUTPATIENT
Start: 2019-05-20 | End: 2019-05-20 | Stop reason: SURG

## 2019-05-20 RX ORDER — SODIUM CHLORIDE, SODIUM LACTATE, POTASSIUM CHLORIDE, CALCIUM CHLORIDE 600; 310; 30; 20 MG/100ML; MG/100ML; MG/100ML; MG/100ML
100 INJECTION, SOLUTION INTRAVENOUS CONTINUOUS
Status: DISCONTINUED | OUTPATIENT
Start: 2019-05-20 | End: 2019-05-20 | Stop reason: HOSPADM

## 2019-05-20 RX ORDER — ONDANSETRON 2 MG/ML
4 INJECTION INTRAMUSCULAR; INTRAVENOUS ONCE AS NEEDED
Status: DISCONTINUED | OUTPATIENT
Start: 2019-05-20 | End: 2019-05-20 | Stop reason: HOSPADM

## 2019-05-20 RX ORDER — METOCLOPRAMIDE HYDROCHLORIDE 5 MG/ML
5 INJECTION INTRAMUSCULAR; INTRAVENOUS
Status: DISCONTINUED | OUTPATIENT
Start: 2019-05-20 | End: 2019-05-20 | Stop reason: HOSPADM

## 2019-05-20 RX ORDER — SODIUM CHLORIDE 0.9 % (FLUSH) 0.9 %
3 SYRINGE (ML) INJECTION AS NEEDED
Status: DISCONTINUED | OUTPATIENT
Start: 2019-05-20 | End: 2019-05-20 | Stop reason: HOSPADM

## 2019-05-20 RX ORDER — SUCCINYLCHOLINE CHLORIDE 20 MG/ML
INJECTION INTRAMUSCULAR; INTRAVENOUS AS NEEDED
Status: DISCONTINUED | OUTPATIENT
Start: 2019-05-20 | End: 2019-05-20 | Stop reason: SURG

## 2019-05-20 RX ORDER — LABETALOL HYDROCHLORIDE 5 MG/ML
5 INJECTION, SOLUTION INTRAVENOUS
Status: DISCONTINUED | OUTPATIENT
Start: 2019-05-20 | End: 2019-05-20 | Stop reason: HOSPADM

## 2019-05-20 RX ORDER — IPRATROPIUM BROMIDE AND ALBUTEROL SULFATE 2.5; .5 MG/3ML; MG/3ML
3 SOLUTION RESPIRATORY (INHALATION) ONCE AS NEEDED
Status: DISCONTINUED | OUTPATIENT
Start: 2019-05-20 | End: 2019-05-20 | Stop reason: HOSPADM

## 2019-05-20 RX ORDER — FENTANYL CITRATE 50 UG/ML
25 INJECTION, SOLUTION INTRAMUSCULAR; INTRAVENOUS AS NEEDED
Status: DISCONTINUED | OUTPATIENT
Start: 2019-05-20 | End: 2019-05-20 | Stop reason: HOSPADM

## 2019-05-20 RX ORDER — NITROFURANTOIN 25; 75 MG/1; MG/1
100 CAPSULE ORAL 2 TIMES DAILY
Qty: 6 CAPSULE | Refills: 0 | Status: SHIPPED | OUTPATIENT
Start: 2019-05-20 | End: 2019-05-23

## 2019-05-20 RX ORDER — ACETAMINOPHEN 500 MG
1000 TABLET ORAL ONCE
Status: COMPLETED | OUTPATIENT
Start: 2019-05-20 | End: 2019-05-20

## 2019-05-20 RX ORDER — SODIUM CHLORIDE, SODIUM LACTATE, POTASSIUM CHLORIDE, CALCIUM CHLORIDE 600; 310; 30; 20 MG/100ML; MG/100ML; MG/100ML; MG/100ML
1000 INJECTION, SOLUTION INTRAVENOUS CONTINUOUS
Status: DISCONTINUED | OUTPATIENT
Start: 2019-05-20 | End: 2019-05-20 | Stop reason: HOSPADM

## 2019-05-20 RX ORDER — SODIUM CHLORIDE 0.9 % (FLUSH) 0.9 %
1-10 SYRINGE (ML) INJECTION AS NEEDED
Status: DISCONTINUED | OUTPATIENT
Start: 2019-05-20 | End: 2019-05-20 | Stop reason: HOSPADM

## 2019-05-20 RX ORDER — NALOXONE HCL 0.4 MG/ML
0.4 VIAL (ML) INJECTION AS NEEDED
Status: DISCONTINUED | OUTPATIENT
Start: 2019-05-20 | End: 2019-05-20 | Stop reason: HOSPADM

## 2019-05-20 RX ORDER — IPRATROPIUM BROMIDE AND ALBUTEROL SULFATE 2.5; .5 MG/3ML; MG/3ML
3 SOLUTION RESPIRATORY (INHALATION) ONCE
Status: COMPLETED | OUTPATIENT
Start: 2019-05-20 | End: 2019-05-20

## 2019-05-20 RX ORDER — ROCURONIUM BROMIDE 10 MG/ML
INJECTION, SOLUTION INTRAVENOUS AS NEEDED
Status: DISCONTINUED | OUTPATIENT
Start: 2019-05-20 | End: 2019-05-20 | Stop reason: SURG

## 2019-05-20 RX ORDER — MAGNESIUM HYDROXIDE 1200 MG/15ML
LIQUID ORAL AS NEEDED
Status: DISCONTINUED | OUTPATIENT
Start: 2019-05-20 | End: 2019-05-20 | Stop reason: HOSPADM

## 2019-05-20 RX ORDER — OXYCODONE AND ACETAMINOPHEN 10; 325 MG/1; MG/1
1 TABLET ORAL ONCE AS NEEDED
Status: DISCONTINUED | OUTPATIENT
Start: 2019-05-20 | End: 2019-05-20 | Stop reason: HOSPADM

## 2019-05-20 RX ORDER — IBUPROFEN 600 MG/1
600 TABLET ORAL ONCE AS NEEDED
Status: DISCONTINUED | OUTPATIENT
Start: 2019-05-20 | End: 2019-05-20 | Stop reason: HOSPADM

## 2019-05-20 RX ORDER — OXYCODONE HYDROCHLORIDE AND ACETAMINOPHEN 5; 325 MG/1; MG/1
1 TABLET ORAL ONCE AS NEEDED
Status: DISCONTINUED | OUTPATIENT
Start: 2019-05-20 | End: 2019-05-20 | Stop reason: HOSPADM

## 2019-05-20 RX ORDER — SORBITOL 30 G/1000ML
IRRIGANT IRRIGATION AS NEEDED
Status: DISCONTINUED | OUTPATIENT
Start: 2019-05-20 | End: 2019-05-20 | Stop reason: HOSPADM

## 2019-05-20 RX ADMIN — FENTANYL CITRATE 100 MCG: 50 INJECTION, SOLUTION INTRAMUSCULAR; INTRAVENOUS at 09:15

## 2019-05-20 RX ADMIN — SODIUM CHLORIDE, POTASSIUM CHLORIDE, SODIUM LACTATE AND CALCIUM CHLORIDE 1000 ML: 600; 310; 30; 20 INJECTION, SOLUTION INTRAVENOUS at 06:38

## 2019-05-20 RX ADMIN — CLINDAMYCIN PHOSPHATE 900 MG: 18 INJECTION, SOLUTION INTRAVENOUS at 09:21

## 2019-05-20 RX ADMIN — SUCCINYLCHOLINE CHLORIDE 100 MG: 20 INJECTION, SOLUTION INTRAMUSCULAR; INTRAVENOUS at 09:17

## 2019-05-20 RX ADMIN — ACETAMINOPHEN 1000 MG: 500 TABLET, FILM COATED ORAL at 07:41

## 2019-05-20 RX ADMIN — PROPOFOL 100 MG: 10 INJECTION, EMULSION INTRAVENOUS at 09:17

## 2019-05-20 RX ADMIN — ROCURONIUM BROMIDE 10 MG: 10 INJECTION INTRAVENOUS at 09:16

## 2019-05-20 RX ADMIN — LIDOCAINE HYDROCHLORIDE 80 MG: 20 INJECTION, SOLUTION INFILTRATION; PERINEURAL at 09:16

## 2019-05-20 RX ADMIN — IPRATROPIUM BROMIDE AND ALBUTEROL SULFATE 3 ML: 2.5; .5 SOLUTION RESPIRATORY (INHALATION) at 07:41

## 2019-05-20 NOTE — ANESTHESIA POSTPROCEDURE EVALUATION
Patient: Elder Mina    Procedure Summary     Date:  05/20/19 Room / Location:   [facility] OR  /  [facility]    Anesthesia Start:  0901 Anesthesia Stop:  0949    Procedure:  CYSTOSCOPY TRANSURETHRAL RESECTION OF BLADDER TUMOR (N/A Bladder) Diagnosis:       Malignant neoplasm of urinary bladder, unspecified site (CMS/HCC)      (Malignant neoplasm of urinary bladder, unspecified site (CMS/HCC) [C67.9])    Surgeon:  Paul Giang MD Provider:  Marco Vargas CRNA    Anesthesia Type:  general ASA Status:  3          Anesthesia Type: general  Last vitals  BP   106/56 (05/20/19 1030)   Temp   97.6 °F (36.4 °C) (05/20/19 1030)   Pulse   72 (05/20/19 1030)   Resp   16 (05/20/19 1030)     SpO2   92 % (05/20/19 1030)     Post Anesthesia Care and Evaluation    Patient location during evaluation: PACU  Patient participation: complete - patient participated  Level of consciousness: awake and alert  Pain management: adequate  Airway patency: patent  Anesthetic complications: No anesthetic complications    Cardiovascular status: acceptable  Respiratory status: acceptable  Hydration status: acceptable    Comments: Blood pressure 106/56, pulse 72, temperature 97.6 °F (36.4 °C), temperature source Temporal, resp. rate 16, SpO2 92 %.    Pt discharged from PACU based on camille score >8

## 2019-05-20 NOTE — ANESTHESIA POSTPROCEDURE EVALUATION
Patient: Elder Mina    Procedure Summary     Date:  05/20/19 Room / Location:   [facility] OR 01 /  [facility]    Anesthesia Start:  0901 Anesthesia Stop:  0949    Procedure:  CYSTOSCOPY TRANSURETHRAL RESECTION OF BLADDER TUMOR (N/A Bladder) Diagnosis:       Malignant neoplasm of urinary bladder, unspecified site (CMS/HCC)      (Malignant neoplasm of urinary bladder, unspecified site (CMS/HCC) [C67.9])    Surgeon:  Paul Giang MD Provider:  Marco Vargas CRNA    Anesthesia Type:  general ASA Status:  3          Anesthesia Type: general  Last vitals  BP   113/62 (05/20/19 1017)   Temp   97.6 °F (36.4 °C) (05/20/19 1017)   Pulse   76 (05/20/19 1017)   Resp   11 (05/20/19 1017)     SpO2   92 % (05/20/19 1017)     Anesthesia Post Evaluation

## 2019-05-20 NOTE — ANESTHESIA PROCEDURE NOTES
Airway  Urgency: elective    Airway not difficult    General Information and Staff    Patient location during procedure: OR  CRNA: Marco Vargas CRNA    Indications and Patient Condition  Indications for airway management: airway protection    Preoxygenated: yes  Mask difficulty assessment: 1 - vent by mask    Final Airway Details  Final airway type: endotracheal airway      Successful airway: ETT  Cuffed: yes   Successful intubation technique: direct laryngoscopy  Facilitating devices/methods: intubating stylet  Endotracheal tube insertion site: oral  Blade: Kim  Blade size: 2  ETT size (mm): 7.5  Cormack-Lehane Classification: grade IIa - partial view of glottis  Placement verified by: chest auscultation and capnometry   Cuff volume (mL): 8  Measured from: lips  ETT to teeth (cm): 21  ETT to lips (cm): 22  Number of attempts at approach: 1

## 2019-05-20 NOTE — ANESTHESIA PREPROCEDURE EVALUATION
Anesthesia Evaluation     Patient summary reviewed and Nursing notes reviewed   no history of anesthetic complications:  NPO Solid Status: > 8 hours  NPO Liquid Status: > 8 hours           Airway   Mallampati: I  TM distance: >3 FB  Neck ROM: full  No difficulty expected  Dental    (+) edentulous    Pulmonary     breath sounds clear to auscultation  (+) a smoker Current Smoked day of surgery, COPD,   Cardiovascular   Exercise tolerance: poor (<4 METS)    ECG reviewed  Rhythm: regular  Rate: normal        Neuro/Psych  (+) psychiatric history Anxiety,     (-) seizures, TIA, CVA  GI/Hepatic/Renal/Endo    (+)   liver disease (cirrhosis, liver cancer),   (-) no renal disease, diabetes    Musculoskeletal     Abdominal    Substance History   (+) alcohol use (quit drinking several weeks ago),      OB/GYN          Other      history of cancer (bladder cancer) active                      Anesthesia Plan    ASA 3     general   (Discussed risk of post op pulm complications and possible need for longer monitoring. Spo2 91-94%. Pt reports he feels as usual, lungs cta. Will pre tx with duoneb. )  intravenous induction   Anesthetic plan, all risks, benefits, and alternatives have been provided, discussed and informed consent has been obtained with: patient.

## 2019-06-05 ENCOUNTER — OFFICE VISIT (OUTPATIENT)
Dept: UROLOGY | Facility: CLINIC | Age: 69
End: 2019-06-05

## 2019-06-05 VITALS — WEIGHT: 156 LBS | TEMPERATURE: 98 F | HEIGHT: 70 IN | BODY MASS INDEX: 22.33 KG/M2

## 2019-06-05 DIAGNOSIS — C67.9 MALIGNANT NEOPLASM OF URINARY BLADDER, UNSPECIFIED SITE (HCC): Primary | ICD-10-CM

## 2019-06-05 PROCEDURE — 99213 OFFICE O/P EST LOW 20 MIN: CPT | Performed by: UROLOGY

## 2019-06-05 NOTE — PROGRESS NOTES
Mr. Mina is 68 y.o. 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 This is a recurrence of a previous diagnosis diagnosis.. The patient was initially diagnosed several month(s) ago. Severity is best is explained as high grade t1 disease. Previous management includes TURBT. Symptoms include irritative symptoms including frequency.              The following portions of the patient's history were reviewed and updated as appropriate: allergies, current medications, past family history, past medical history, past social history, past surgical history and problem list.    Review of Systems   Constitutional: Negative for chills and fever.   Gastrointestinal: Negative for abdominal pain, anal bleeding and blood in stool.   Genitourinary: Positive for frequency. Negative for decreased urine volume, difficulty urinating, discharge, dysuria, enuresis, flank pain, genital sores, hematuria, penile pain, penile swelling, scrotal swelling, testicular pain and urgency.       I have reviewed the review of systems      Current Outpatient Medications:   •  ALPRAZolam (XANAX) 1 MG tablet, Take 1 mg by mouth 2 (Two) Times a Day As Needed for Anxiety., Disp: , Rfl:   •  budesonide-formoterol (SYMBICORT) 160-4.5 MCG/ACT inhaler, Inhale 2 puffs Daily., Disp: , Rfl:   •  ipratropium-albuterol (COMBIVENT RESPIMAT)  MCG/ACT inhaler, Inhale 1 puff 4 (Four) Times a Day As Needed for Wheezing., Disp: , Rfl:   •  oxyCODONE-acetaminophen (PERCOCET) 5-325 MG per tablet, Take 1 tablet by mouth Every 6 (Six) Hours As Needed for Severe Pain ., Disp: 12 tablet, Rfl: 0    Past Medical History:   Diagnosis Date   • Anxiety    • Cancer (CMS/HCC)     bladder - karla trotter   • Cataracts, bilateral    • Cirrhosis (CMS/HCC)    • COPD (chronic obstructive pulmonary disease) (CMS/HCC)    • Hematuria    • Liver  "cancer (CMS/HCC)    • Urinary retention        Past Surgical History:   Procedure Laterality Date   • CYSTOSCOPY     • ENDOSCOPY N/A 9/20/2018    Procedure: ESOPHAGOGASTRODUODENOSCOPY WITH ANESTHESIA;  Surgeon: Sanford Barahona DO;  Location: [facility] ENDOSCOPY;  Service: Gastroenterology   • EXPLORATORY LAPAROTOMY      \"cyst\" removed   • EYE SURGERY Right     cataract   • LIVER SURGERY  12/06/2018    went up through groin to cancerous liver nodule and cut off its blood supply and injected chemo into it   • TRANSURETHRAL RESECTION OF BLADDER TUMOR N/A 9/28/2018    Procedure: CYSTOSCOPY TRANSURETHRAL RESECTION OF BLADDER TUMOR;  Surgeon: Paul Giang MD;  Location: [facility] OR;  Service: Urology   • TRANSURETHRAL RESECTION OF BLADDER TUMOR N/A 1/21/2019    Procedure: CYSTOSCOPY TRANSURETHRAL RESECTION OF BLADDER TUMOR;  Surgeon: Paul Giang MD;  Location: [facility] OR;  Service: Urology   • TRANSURETHRAL RESECTION OF BLADDER TUMOR N/A 5/20/2019    Procedure: CYSTOSCOPY TRANSURETHRAL RESECTION OF BLADDER TUMOR;  Surgeon: Paul Giang MD;  Location: [facility] OR;  Service: Urology       Social History     Socioeconomic History   • Marital status:      Spouse name: Not on file   • Number of children: Not on file   • Years of education: Not on file   • Highest education level: Not on file   Tobacco Use   • Smoking status: Current Every Day Smoker     Packs/day: 1.00     Years: 40.00     Pack years: 40.00   • Smokeless tobacco: Never Used   Substance and Sexual Activity   • Alcohol use: No     Frequency: Never   • Drug use: No   • Sexual activity: Defer       Family History   Problem Relation Age of Onset   • Colon cancer Father        Objective    Temp 98 °F (36.7 °C)   Ht 177 cm (69.69\")   Wt 70.8 kg (156 lb)   BMI 22.58 kg/m²     Physical Exam    Admission on 05/20/2019, Discharged on 05/20/2019   Component Date Value Ref Range Status   • Case Report 05/20/2019    Final                    " Value:Surgical Pathology Report                         Case: [id number]                                  Authorizing Provider:  Paul Giang MD    Collected:           05/20/2019 09:28 AM          Ordering Location:     [facility] OR  Received:            05/20/2019 11:08 AM          Pathologist:           Venus Nunez MD                                                        Specimens:   1) - Urinary Bladder, left bladder wall                                                             2) - Urinary Bladder, posterior bladder wall                                                        3) - Urinary Bladder, right bladder wall                                                  • Final Diagnosis 05/20/2019    Final                    Value:This result contains rich text formatting which cannot be displayed here.   • Gross Description 05/20/2019    Final                    Value:This result contains rich text formatting which cannot be displayed here.   • Microscopic Description 05/20/2019    Final                    Value:This result contains rich text formatting which cannot be displayed here.       Results for orders placed or performed during the hospital encounter of 05/20/19   Tissue Pathology Exam   Result Value Ref Range    Case Report       Surgical Pathology Report                         Case: [id number]                                  Authorizing Provider:  Paul Giang MD    Collected:           05/20/2019 09:28 AM          Ordering Location:     [facility] OR  Received:            05/20/2019 11:08 AM          Pathologist:           Venus Nunez MD                                                        Specimens:   1) - Urinary Bladder, left bladder wall                                                             2) - Urinary Bladder, posterior bladder wall                                                        3) - Urinary Bladder, right bladder wall       "                                             Final Diagnosis       1.  Urinary bladder, left wall, biopsy:  A.  Invasive urothelial carcinoma with nested features invading the lamina propria.  B.  No muscularis propria smooth muscle present for evaluation.    2.  Urinary bladder, posterior wall, biopsy:  A.  Invasive urothelial carcinoma with nested features invading the lamina propria.  B.  No muscularis propria smooth muscle present for evaluation.    3.  Urinary bladder, right wall, biopsy: Invasive papillary urothelial carcinoma, high-grade.    Comment: The tumor invades the muscularis propria.  Focally, invasive carcinoma is seen involving thin smooth muscle bundles.  It is difficult to distinguish whether these muscle bundles represent muscularis propria that has been partially destroyed by the invasive cancer or muscularis mucosa from the lamina propria.  Additional tissue sampling is recommended.    An extra departmental consultation was obtained from Dr. Jhonatan Parson at [facility].  His diagnostic impressions are reflected in the above diagnoses.  Please refer to the complete pathology report from Dr. Parson which is appended.    AJCC stage: At least pT1 pNX      Gross Description       Specimen #1 is received in a formalin filled container, labeled with the patient's name, date of birth, and \"left bladder wall biopsy\".  The specimen consists of 2 tan-pink, mucosa covered tissue fragments aggregating to 0.6 x 0.5 x 0.2 cm.  The mucosal surfaces appear erythematous and otherwise unremarkable.  The specimen is totally submitted in block 1A.    Specimen #2 is received in a formalin filled container, labeled with the patient's name, date of birth, and \"posterior bladder wall biopsy\".  The specimen consists of one mucosa covered tissue fragment measuring 0.5 x 0.3 x 0.2 cm.  The mucosal surface is diffusely red-tan, erythematous and raised.  The specimen is totally submitted in block " "2A.    Specimen #3 is received in a formalin filled container, labeled with the patient's name, date of birth, and \"right bladder wall biopsy\".  The specimen consists of 2 yellow-pink mucosa covered tissue fragments aggregating to 0.4 x 0.2 x 0.2 cm.  The mucosal surfaces appear slightly erythematous and otherwise unremarkable.  The specimen is totally submitted in block 3A.      Microscopic Description       1.  Sections of the left bladder wall biopsy reveal histologic changes of invasive urothelial carcinoma with nested features.  Invasion into the lamina propria by closely spaced, irregular nests of urothelial cells is seen.  The urothelial cells demonstrate mild cytologic atypia with occasional cells demonstrating more enlarged nuclei.  There is no muscularis propria present.  Cautery artifact is seen.    2.  Sections of the posterior bladder wall biopsy reveal invasive urothelial carcinoma with nested features.  A chronic inflammatory infiltrate is present.  Lamina propria invasion is seen.  There is no muscularis propria smooth muscle present.    3.  Sections of the right bladder wall biopsy reveal invasive high-grade papillary urothelial carcinoma.  Lamina propria invasion is present.  Chronic inflammation is seen.  The invasive carcinoma focally involves thin smooth muscle bundles.  It is difficult to distinguish whether these muscle bundles represent muscularis propria that has been partially destroyed by the invasive cancer or muscularis mucosa from the lamina propria.       Patient's Body mass index is 22.58 kg/m². BMI is within normal parameters. No follow-up required..    Assessment and Plan    Elder was seen today for bladder cancer.    Diagnoses and all orders for this visit:    Malignant neoplasm of urinary bladder, unspecified site (CMS/HCC)  -     POC Urinalysis Dipstick, Multipro    Patient with a history of bladder cancer high-grade T1.  His pathology came back indeterminate but appears to be " T2 bladder cancer, with invasion of muscle difficult to tell if this is mucosa or muscularis propria.  I believe this is progression of his disease.  Previously he could not be started on BCG as he was receiving chemotherapy for a liver cancer.  Today I did discuss with him that his disease is at least T1, more than likely T2 disease.  I would like to treat this more aggressively as a T2 disease and I will refer him to [facility] for evaluation and possible treatment.  See him back in a few weeks.

## 2019-06-06 ENCOUNTER — TELEPHONE (OUTPATIENT)
Dept: UROLOGY | Facility: CLINIC | Age: 69
End: 2019-06-06

## 2019-06-06 NOTE — TELEPHONE ENCOUNTER
I called patient to give him his appointment with [facility]. His appointment is 6/11/19 at 2:30 pm with amy Roach. He will need to bring insurance card, id and co pay if he has one.     They will have Dr Dawn view his records to see if we need to change the appointment.

## 2019-06-18 ENCOUNTER — HOSPITAL ENCOUNTER (OUTPATIENT)
Dept: HOSPITAL 89 - RAD | Age: 69
End: 2019-06-18
Attending: FAMILY MEDICINE
Payer: COMMERCIAL

## 2019-06-18 VITALS — BODY MASS INDEX: 13.14 KG/M2

## 2019-06-18 DIAGNOSIS — R06.02: Primary | ICD-10-CM

## 2019-06-18 PROCEDURE — 71046 X-RAY EXAM CHEST 2 VIEWS: CPT

## 2019-06-18 NOTE — RADIOLOGY IMAGING REPORT
FACILITY: [facility] 

 

PATIENT NAME: Darryl Gannon

: 1950

MR: [id number]

V: [id number]

EXAM DATE: 

ORDERING PHYSICIAN: ISRRAEL DONG

TECHNOLOGIST: 

 

Location: [facility]

Patient: Darryl Gannon

: 1950

MRN: [id number]

Visit/Account:[id number]

Date of Sevice:  2019

 

ACCESSION #: [id number]

 

Study: Frontal and lateral views of the chest

 

Indication: Shortness breath, cough, chest tightness

 

Comparison study: 2017

 

Findings: PA and lateral views of the chest demonstrate no evidence of acute infiltrate.

 

There is no evidence of pleural effusion.

 

There is no evidence of pneumothorax.

 

The mediastinal, cardiac, and diaphragmatic contours are unremarkable.

 

The visualized bony structures are unremarkable.

 

IMPRESSION: Unremarkable chest.

 

Report Dictated By: Sulaiman Gilliland at 2019 3:39 PM

 

Report E-Signed By: Sulaiman Gilliland  at 2019 3:40 PM

 

WSN:[id number]

## 2019-07-23 ENCOUNTER — HOSPITAL ENCOUNTER (EMERGENCY)
Dept: HOSPITAL 89 - ER | Age: 69
Discharge: ANOTHER HOSPITAL | End: 2019-07-23
Payer: COMMERCIAL

## 2019-07-23 ENCOUNTER — HOSPITAL ENCOUNTER (OUTPATIENT)
Dept: HOSPITAL 89 - AMB | Age: 69
End: 2019-07-23
Payer: COMMERCIAL

## 2019-07-23 VITALS — DIASTOLIC BLOOD PRESSURE: 47 MMHG | SYSTOLIC BLOOD PRESSURE: 88 MMHG

## 2019-07-23 VITALS — BODY MASS INDEX: 13.14 KG/M2 | WEIGHT: 87.5 LBS

## 2019-07-23 VITALS — BODY MASS INDEX: 13.14 KG/M2 | BODY MASS INDEX: 13.14 KG/M2

## 2019-07-23 DIAGNOSIS — R42: ICD-10-CM

## 2019-07-23 DIAGNOSIS — R53.1: Primary | ICD-10-CM

## 2019-07-23 DIAGNOSIS — F17.200: ICD-10-CM

## 2019-07-23 DIAGNOSIS — E87.5: ICD-10-CM

## 2019-07-23 DIAGNOSIS — N17.9: Primary | ICD-10-CM

## 2019-07-23 DIAGNOSIS — I25.2: ICD-10-CM

## 2019-07-23 DIAGNOSIS — K59.00: ICD-10-CM

## 2019-07-23 DIAGNOSIS — Z86.73: ICD-10-CM

## 2019-07-23 DIAGNOSIS — Z79.82: ICD-10-CM

## 2019-07-23 DIAGNOSIS — Z79.899: ICD-10-CM

## 2019-07-23 DIAGNOSIS — R09.02: ICD-10-CM

## 2019-07-23 DIAGNOSIS — Z98.890: ICD-10-CM

## 2019-07-23 DIAGNOSIS — R62.7: ICD-10-CM

## 2019-07-23 DIAGNOSIS — E46: ICD-10-CM

## 2019-07-23 LAB
INR PPP: 1.02
PLATELET COUNT, AUTOMATED: 320 K/UL (ref 150–450)

## 2019-07-23 PROCEDURE — 83690 ASSAY OF LIPASE: CPT

## 2019-07-23 PROCEDURE — 84460 ALANINE AMINO (ALT) (SGPT): CPT

## 2019-07-23 PROCEDURE — 82565 ASSAY OF CREATININE: CPT

## 2019-07-23 PROCEDURE — 84520 ASSAY OF UREA NITROGEN: CPT

## 2019-07-23 PROCEDURE — 84295 ASSAY OF SERUM SODIUM: CPT

## 2019-07-23 PROCEDURE — 84075 ASSAY ALKALINE PHOSPHATASE: CPT

## 2019-07-23 PROCEDURE — 74176 CT ABD & PELVIS W/O CONTRAST: CPT

## 2019-07-23 PROCEDURE — 84450 TRANSFERASE (AST) (SGOT): CPT

## 2019-07-23 PROCEDURE — 96365 THER/PROPH/DIAG IV INF INIT: CPT

## 2019-07-23 PROCEDURE — 84155 ASSAY OF PROTEIN SERUM: CPT

## 2019-07-23 PROCEDURE — 85730 THROMBOPLASTIN TIME PARTIAL: CPT

## 2019-07-23 PROCEDURE — 71250 CT THORAX DX C-: CPT

## 2019-07-23 PROCEDURE — 82803 BLOOD GASES ANY COMBINATION: CPT

## 2019-07-23 PROCEDURE — 99284 EMERGENCY DEPT VISIT MOD MDM: CPT

## 2019-07-23 PROCEDURE — 82247 BILIRUBIN TOTAL: CPT

## 2019-07-23 PROCEDURE — 81001 URINALYSIS AUTO W/SCOPE: CPT

## 2019-07-23 PROCEDURE — 96375 TX/PRO/DX INJ NEW DRUG ADDON: CPT

## 2019-07-23 PROCEDURE — 82040 ASSAY OF SERUM ALBUMIN: CPT

## 2019-07-23 PROCEDURE — 82374 ASSAY BLOOD CARBON DIOXIDE: CPT

## 2019-07-23 PROCEDURE — 82947 ASSAY GLUCOSE BLOOD QUANT: CPT

## 2019-07-23 PROCEDURE — 84132 ASSAY OF SERUM POTASSIUM: CPT

## 2019-07-23 PROCEDURE — 96376 TX/PRO/DX INJ SAME DRUG ADON: CPT

## 2019-07-23 PROCEDURE — 82550 ASSAY OF CK (CPK): CPT

## 2019-07-23 PROCEDURE — 82310 ASSAY OF CALCIUM: CPT

## 2019-07-23 PROCEDURE — 85610 PROTHROMBIN TIME: CPT

## 2019-07-23 PROCEDURE — 83605 ASSAY OF LACTIC ACID: CPT

## 2019-07-23 PROCEDURE — 85025 COMPLETE CBC W/AUTO DIFF WBC: CPT

## 2019-07-23 PROCEDURE — 93005 ELECTROCARDIOGRAM TRACING: CPT

## 2019-07-23 PROCEDURE — 84484 ASSAY OF TROPONIN QUANT: CPT

## 2019-07-23 PROCEDURE — 82435 ASSAY OF BLOOD CHLORIDE: CPT

## 2019-07-23 NOTE — RADIOLOGY IMAGING REPORT
FACILITY: [facility] 

 

PATIENT NAME: Darryl Gannon

: 1950

MR: [id number]

V: [id number]

EXAM DATE: 

ORDERING PHYSICIAN: ELIZABETH CUELLAR

TECHNOLOGIST: 

 

Location: [facility]

Patient: Darryl Gannon

: 1950

MRN: [id number]

Visit/Account:[id number]

Date of Sevice:  2019

 

ACCESSION #: [id number]

 

CT chest abdomen and pelvis without contrast

 

INDICATION: Diffuse chest and abdomen pain.

 

COMPARISON: CT and pelvis on 5/3/2018.

 

Technique: Axial CT images are obtained through the chest abdomen and pelvis without administration o
f IV contrast. Reformatted coronal and sagittal images were reviewed.

One of the following dose optimization techniques was utilized in the performance of this exam: Autom
ated exposure control; adjustment of the mA and/or kV according to the patient's size; or use of an i
terative  reconstruction technique.  Specific details can be referenced in the facility's radiology C
T exam operational policy.

 

FINDINGS:

CT Chest:

Heart is normal size without pericardial effusion. Coronary artery calcination indications. The aorta
 shows mild atherosclerotic calcific changes. The ascending aorta is upper limits normal for size at 
3.9 cm without aneurysm. The remaining aorta shows no indication of aneurysm. The pulmonary arteries 
are grossly normal.

There is no mediastinal hematoma and there is no pathologic mediastinal adenopathy seen.

 

Lungs show no consolidation, pleural effusion or pneumothorax. Mild chronic peripheral interstitial c
hanges. No nodules or focal interstitial opacity. Airways are clear.

 

Bony structures show no acute fractures or aggressive bony lesions. Degenerative change seen spine. C
hest wall shows no enlarged axillary lymph nodes or masses.

 

CT Abdomen and Pelvis:

Evaluation of the solid organs of the abdomen is limited without IV contrast.

The spleen, gallbladder, biliary system, pancreas, spleen and adrenal glands within normal limits.

 

Both kidneys show calcifications within the collecting system without hydronephrosis. The largest arjun
cification on the right side measures 4.5 mm and the largest on the left side measures 3 mm. No discr
ete renal lesions. The ureters are unremarkable.

 

Diverticula seen along the sigmoid colon without pericolonic inflammation. The colon shows no other f
ocal abnormality. The appendix is not definitely visualized. The small bowel in the left abdomen does
 show small short segment of intussusception without indication of obstruction. No discrete lesion is
 seen on this noncontrast exam. The small bowel shows no other focal abnormality or dilatation. The s
tomach is unremarkable.

 

No free air, free fluid, fluid collections or areas of inflammation. The visualized pelvic structures
 within normal limits. Bony structures show no acute fractures or aggressive bony lesions. Degenerati
ve change seen spine. Left hip arthroplasty changes without sequelae. There is mild subchondral bony 
linear sclerosis to the right femoral head. This is unchanged. This may represent chronic avascular n
ecrosis.

 

 

IMPRESSION:

1. No indication of acute abnormality to the chest, abdomen or pelvis. However, the left mid abdomen 
there is a small segment of small bowel intussusception without focal abnormality or discrete indicat
ion of obstruction at this time. This could be transient but not specific. If symptoms persist a foll
ow-up exam can reevaluate the bowel gas pattern.

2. Bilateral nonobstructing renal calculi.

3. Diverticulosis without radiographic indication diverticulitis.

4. Other chronic stable findings as above.

 

Report Dictated By: Reno Cisneros at 2019 5:55 PM

 

Report E-Signed By: Reno Cisneros  at 2019 6:09 PM

 

WSN:[id number]

## 2019-07-23 NOTE — EKG
FACILITY: [facility] 

 

PATIENT NAME: SOLANGE MONTAÑO

: [id number]

MR: [id number]

V: [id number]

EXAM DATE: 

ORDERING PHYSICIAN: ALYSA ROSE

TECHNOLOGIST: DH

 

Test Reason : REPEAT EKG

Blood Pressure : ***/*** mmHG

Vent. Rate : 083 BPM     Atrial Rate : 083 BPM

   P-R Int : 132 ms          QRS Dur : 072 ms

    QT Int : 342 ms       P-R-T Axes : 089 073 080 degrees

   QTc Int : 401 ms

 

Normal sinus rhythm

Normal ECG

When compared with ECG of 2019 15:31,

Rate had decreased by 23 BPM

Left anterior fascicular block is no longer present

Confirmed by Luis Fernando Martinez ([id number]) on 2019 8:50:35 PM

 

Referred By:             Confirmed By:Luis Fernando Robin

## 2019-07-23 NOTE — ER REPORT
History and Physical


Time Seen By MD:  15:42


Hx. of Stated Complaint:  


FAMILY STATES N/V, R HIP PAIN AND REFUSAL TO EAT FOR SEVERAL WEEKS. C/O SOB. 


STATE RECENT HIP SURG. "HE HASN'T BEEN THE SAME SINCE THE OPERATION"


 (ELIZABETH JUAREZ DO)


HPI/ROS


CHIEF COMPLAINT: Nausea vomiting, chronic pain, failure to thrive





HISTORY OF PRESENT ILLNESS: Patient is a 68-year-old male with a history of 


smoking here with complaints of worsening chronic pain in the setting of a 


recent hip surgery, decreased appetite, failure to thrive, weight loss. Patient 


does report a history of myocardial infarction, CVA. Patient was also reportedly


hypoxic, short of breath per EMS report. Denies prior history of COPD, 


malignancy. Patient is cachectic appearing.





REVIEW OF SYSTEMS:


Constitutional: No fever, + chills.


Eyes: No discharge.


ENT: No sore throat. 


Cardiovascular: No chest pain, no palpitations.


Respiratory: No cough, + shortness of breath.


Gastrointestinal: + abdominal pain, no vomiting. + Nausea and decreased appetite


Genitourinary: No hematuria.


Musculoskeletal: + Hip and back pain.


Skin: No rashes.


Neurological: No headache.


 (ELIZABETH JUAREZ DO)


Allergies:  


Coded Allergies:  


     No Known Drug Allergies (Verified , 11/2/17)


Home Meds


Active Scripts


Aspirin (ASPIRIN) 325 Mg Tablet, 325 MG PO QDAY, #30 TAB


   Prov:RENE DEWITT MJ FN         9/13/18


Reported Medications


Tramadol Hcl (TRAMADOL HCL) 100 Mg Tab.er.24h, 100 MG PO QDAY, TAB


   7/23/19


Celecoxib (CELEBREX) 200 Mg Capsule, 200 MG PO QDAY, CAPSULE


   7/23/19


Hydrocodone Bit/Acetaminophen (NORCO 7.5-325 TABLET) 1 Each Tablet, 1 EACH PO 


Q4H PRN for PAIN, #30


   9/13/18


Trazodone Hcl (TRAZODONE HCL) 100 Mg Tablet, 100 MG PO QHS, TAB


   9/4/18


Hx Smoking:  Yes (1/2 PPD)


Smoking Status:  Current: Every Day Smoker


Hx Alcohol Use:  Yes


 (ELIZABETH JUAREZ DO)


Constitutional





Vital Sign - Last 24 Hours








 7/23/19 7/23/19 7/23/19 7/23/19





 15:35 15:36 15:40 15:41


 


Pulse ???   


 


Resp    20


 


B/P (MAP)  90/78 (82) 84/67 (73) 90/72


 


O2 Delivery    Room Air





 7/23/19 7/23/19 7/23/19 7/23/19





 15:45 15:49 15:49 15:50


 


Temp   97.0 


 


Pulse   96 


 


Resp   18 


 


B/P (MAP) 109/98 (102)  109/90 (96) 80/62 (68)


 


Pulse Ox   96 


 


O2 Delivery   Oxy Mask 


 


O2 Flow Rate  3.0 3 


 


    





 7/23/19 7/23/19 7/23/19 7/23/19





 15:55 16:00 16:05 16:10


 


Pulse   85 


 


Resp   22 


 


B/P (MAP) 92/62 (72) 86/63 (71) 89/56 (67) 84/64 (71)


 


Pulse Ox   99 





 7/23/19 7/23/19 7/23/19 7/23/19





 16:15 16:20 16:25 16:30


 


B/P (MAP) 87/58 (68) 95/66 (76) 81/68 (72) 128/70 (89)





 7/23/19 7/23/19 7/23/19 7/23/19





 16:35 16:40 16:45 16:50


 


Pulse 86   


 


Resp 15   


 


B/P (MAP) 92/45 (61) 105/72 (83) 101/67 (78) 101/63 (76)


 


Pulse Ox 89   





 7/23/19 7/23/19 7/23/19 7/23/19





 16:55 17:00 17:05 17:10


 


Pulse ???   


 


Resp 33   


 


B/P (MAP)  ???/??? (1665) 96/83 (87) 116/72 (87)


 


Pulse Ox 100   





 7/23/19 7/23/19 7/23/19 7/23/19





 17:15 17:20 17:25 17:30


 


Pulse   75 72


 


Resp   19 31


 


B/P (MAP) 107/61 (76) 101/69 (80) 95/67 (76) 102/62 (75)


 


Pulse Ox   100 100





 7/23/19 7/23/19 7/23/19 7/23/19





 17:35 17:40 17:45 17:50


 


B/P (MAP) 103/68 (80) 103/62 (76) 113/60 (77) 100/61 (74)





 7/23/19 7/23/19 7/23/19 7/23/19





 17:55 18:00 18:05 18:10


 


Pulse  73  


 


Resp  18  


 


B/P (MAP) 95/74 (81) 108/72 (84) 110/72 (85) 107/69 (82)


 


Pulse Ox  100  





 7/23/19 7/23/19 7/23/19 7/23/19





 18:15 18:20 18:25 18:30


 


Resp    25


 


B/P (MAP) 113/79 (90) 113/71 (85) 113/58 (76) 84/67 (73)


 


Pulse Ox    100





 7/23/19 7/23/19 7/23/19 7/23/19





 18:35 18:40 18:45 18:50


 


Pulse    79


 


Resp    18


 


B/P (MAP) 123/87 (99) 120/81 (94) 108/76 (87) 114/84 (94)


 


Pulse Ox    99





 7/23/19 7/23/19 7/23/19 7/23/19





 18:55 19:00 19:05 19:20


 


Resp    12


 


B/P (MAP) 104/71 (82) 116/67 (83) 96/67 (77) 121/58 (79)


 


Pulse Ox    86





 7/23/19 7/23/19 7/23/19 





 19:40 19:50 20:00 


 


Pulse  92  


 


Resp  25  


 


B/P (MAP) 97/63 (74)  88/47 (61) 


 


Pulse Ox  100  














Intake and Output   


 


 7/23/19 7/23/19 7/24/19





 15:03 23:03 07:03


 


Intake Total  1910 ml 


 


Output Total  10 ml 


 


Balance  1900 ml 





 (ALYSA ROSE MD)


Physical Exam


   General Appearance: Difficulty breathing, uncomfortable appearing


Eyes: Pupils equal and round no pallor or injection.


ENT, Mouth: Mucous membranes are moist.


Respiratory: Diminished breath sounds bilaterally, no rhonchi


Cardiovascular: Regular rate and rhythm. 


Gastrointestinal: Diffuse abdominal tenderness, no rebound or guarding


Neurological: Somnolent, answering questions intermittently


Skin: Warm and dry, no rashes.


Musculoskeletal:  Neck is supple non tender.


      Extremities are nontender, nonswollen and have full range of motion.





DIFFERENTIAL DIAGNOSIS: After history and physical exam differential diagnosis 


was considered for shortness of breath including but not limited to pulmonary 


infectious process, COPD, asthma, pulmonary embolus and congestive heart 


failure, malignancy, failure to thrive, dehydration, electrolyte abnormality


 (ELIZABETH JUAREZ DO)





Medical Decision Making


Data Points


Result Diagram:  


7/23/19 1545                                                                    


           7/23/19 1545





Laboratory





Hematology








Test


 7/23/19


15:45


 


White Blood Count


 14.5 k/uL


(4.5-11.0)  H


 


Red Blood Count


 3.63 M/uL


(4.00-5.60)  L


 


Hemoglobin


 12.8 g/dL


(14.0-18.0)  L


 


Hematocrit


 37.5 %


(42.0-52.0)  L


 


Mean Corpuscular Volume


 103.1 fL


(80.0-96.0)  H


 


Mean Corpuscular Hemoglobin


 35.3 pg


(26.0-33.0)  H


 


Mean Corpuscular Hemoglobin


Concent 34.2 g/dL


(32.0-36.0)


 


Red Cell Distribution Width


 12.7 %


(11.5-14.5)


 


Platelet Count


 320 K/uL


(150-450)


 


Mean Platelet Volume


 7.5 fL


(7.2-11.1)


 


Neutrophils (%) (Auto)


 78.0 %


(39.4-72.5)  H


 


Lymphocytes (%) (Auto)


 12.2 %


(17.6-49.6)  L


 


Monocytes (%) (Auto)


 6.2 %


(4.1-12.4)


 


Eosinophils (%) (Auto)


 2.6 %


(0.4-6.7)


 


Basophils (%) (Auto)


 1.0 %


(0.3-1.4)


 


Nucleated RBC Relative Count


(auto) 0.0 /100WBC  





 


Neutrophils # (Auto)


 11.3 K/uL


(2.0-7.4)  H


 


Lymphocytes # (Auto)


 1.8 K/uL


(1.3-3.6)


 


Monocytes # (Auto)


 0.9 K/uL


(0.3-1.0)


 


Eosinophils # (Auto)


 0.4 K/uL


(0.0-0.5)


 


Basophils # (Auto)


 0.1 K/uL


(0.0-0.1)


 


Nucleated RBC Absolute Count


(auto) 0.00 K/uL  











Chemistry








Test


 7/23/19


15:45


 


Sodium Level


 138 mmol/L


(137-145)


 


Potassium Level


 6.0 mmol/L


(3.5-5.0)


 


Chloride Level


 107 mmol/L


()


 


Carbon Dioxide Level


 13 mmol/L


(22-30)


 


Blood Urea Nitrogen


 80 mg/dl


(9-21)


 


Creatinine


 5.40 mg/dl


(0.66-1.25)


 


Glomerular Filtration Rate


Calc 10.6 





 


Random Glucose


 100 mg/dl


()


 


Lactate


 1.8 mmol/L


(0.7-2.1)


 


Calcium Level


 9.8 mg/dl


(8.4-10.2)


 


Total Bilirubin


 0.9 mg/dl


(0.2-1.3)


 


Aspartate Amino Transf


(AST/SGOT) 115 U/L (0-35) 





 


Alanine Aminotransferase


(ALT/SGPT) 51 U/L (0-56) 





 


Alkaline Phosphatase 91 U/L (0-126) 


 


Total Creatine Kinase


 2181 U/L


()


 


Troponin I 0.112 ng/ml 


 


Total Protein


 7.3 g/dl


(6.3-8.2)


 


Albumin


 4.2 g/dl


(3.5-5.0)


 


Lipase


 98 U/L


()








Coagulation








Test


 7/23/19


15:45


 


Prothrombin Time


 13.4 seconds


(12.0-14.4)


 


Prothromb Time International


Ratio 1.02 





 


Activated Partial


Thromboplast Time 31 seconds


(23-35)








Urinalysis








Test


 7/23/19


16:22


 


Urine Color Yellow 


 


Urine Clarity Clear 


 


Urine pH


 5.0 pH


(4.8-9.5)


 


Urine Specific Gravity 1.023 


 


Urine Protein


 30 mg/dL


(NEGATIVE)


 


Urine Glucose (UA)


 Negative mg/dL


(NEGATIVE)


 


Urine Ketones


 20 mg/dL


(NEGATIVE)


 


Urine Blood


 Large


(NEGATIVE)


 


Urine Nitrite


 Negative


(NEGATIVE)


 


Urine Bilirubin


 Negative


(NEGATIVE)


 


Urine Urobilinogen


 2.0 mg/dL


(0.2-1.9)


 


Urine Leukocyte Esterase


 Negative


(NEGATIVE)


 


Urine RBC


 <1 /HPF


(0-2/HPF)


 


Urine WBC


 3 /HPF


(0-5/HPF)


 


Urine Squamous Epithelial


Cells None /LPF


(</=FEW)


 


Urine Transitional Epithelial


Cells Few /LPF


(NONE-FEW)


 


Urine Bacteria


 Few /HPF


(NONE-FEW)


 


Urine Hyaline Casts


 Many /LPF


(NONE-FEW)


 


Urine Granular Casts


 Few /LPF


(NONE)


 


Urine Mucus


 None /HPF


(NONE-FEW)





 ([facility])





EKG/Imaging


EKG Interpretation


12 lead EKG:


      Rhythm: normal sinus rhythm, rate 83


      Axis: normal 


      QRS: normal


      ST segments: No current signs of ischemia with normal ST segments and 


normal appearing T waves


Imaging


CT chest abdomen and pelvis without contrast


 


INDICATION: Diffuse chest and abdomen pain.


 


COMPARISON: CT and pelvis on 5/3/2018.


 


Technique: Axial CT images are obtained through the chest abdomen and pelvis 


without administration of IV contrast. Reformatted coronal and sagittal images 


were reviewed.


One of the following dose optimization techniques was utilized in the 


performance of this exam: Automated exposure control; adjustment of the mA 


and/or kV according to the patient's size; or use of an iterative  


reconstruction technique.  Specific details can be referenced in the facility's 


radiology CT exam operational policy.


 


FINDINGS:


CT Chest:


Heart is normal size without pericardial effusion. Coronary artery calcination 


indications. The aorta shows mild atherosclerotic calcific changes. The 


ascending aorta is upper limits normal for size at 3.9 cm without aneurysm. The 


remaining aorta shows no indication of aneurysm. The pulmonary arteries are 


grossly normal.


There is no mediastinal hematoma and there is no pathologic mediastinal 


adenopathy seen.


 


Lungs show no consolidation, pleural effusion or pneumothorax. Mild chronic 


peripheral interstitial changes. No nodules or focal interstitial opacity. 


Airways are clear.


 


Bony structures show no acute fractures or aggressive bony lesions. Degenerative


change seen spine. Chest wall shows no enlarged axillary lymph nodes or masses.


 


CT Abdomen and Pelvis:


Evaluation of the solid organs of the abdomen is limited without IV contrast.


The spleen, gallbladder, biliary system, pancreas, spleen and adrenal glands 


within normal limits.


 


Both kidneys show calcifications within the collecting system without 


hydronephrosis. The largest calcification on the right side measures 4.5 mm and 


the largest on the left side measures 3 mm. No discrete renal lesions. The 


ureters are unremarkable.


 


Diverticula seen along the sigmoid colon without pericolonic inflammation. The 


colon shows no other focal abnormality. The appendix is not definitely 


visualized. The small bowel in the left abdomen does show small short segment of


intussusception without indication of obstruction. No discrete lesion is seen on


this noncontrast exam. The small bowel shows no other focal abnormality or 


dilatation. The stomach is unremarkable.


 


No free air, free fluid, fluid collections or areas of inflammation. The 


visualized pelvic structures within normal limits. Bony structures show no acute


fractures or aggressive bony lesions. Degenerative change seen spine. Left hip 


arthroplasty changes without sequelae. There is mild subchondral bony linear 


sclerosis to the right femoral head. This is unchanged. This may represent 


chronic avascular necrosis.


 


IMPRESSION:


1. No indication of acute abnormality to the chest, abdomen or pelvis. However, 


the left mid abdomen there is a small segment of small bowel intussusception 


without focal abnormality or discrete indication of obstruction at this time. 


This could be transient but not specific. If symptoms persist a follow-up exam 


can reevaluate the bowel gas pattern.


2. Bilateral nonobstructing renal calculi.


3. Diverticulosis without radiographic indication diverticulitis.


4. Other chronic stable findings as above.


 


Report Dictated By: Reno Cisneros at 7/23/2019 5:55 PM


 ([facility])


ED Course/Re-evaluation


ED Course


Patient is a 68-year-old male here with complaints of failure to thrive, 


shortness breath, abdominal pain, chronic pain, cachectic appearing. Patient was


initially found to be hypoxic by EMS on his baseline oxygen. Patient was 


reportedly started on oxygen on Friday. Patient was found be in renal failure, 


severe dehydration, CT imaging pending at time of sign out.


 (ELIZABETH JUAREZ DO)


Clinical Indication for ER IV:  Hydration, IV Access


ED Course


I assumed care of this patient from Dr. Juarez at shift change. Patient is a 68


year old male. Diffuse abdominal pain for several days, and worsening shortness 


of breath. Found to be hypotensive on initial vital signs and given a liter of 


normal saline with improved pressures. Hypoxia initially was 82% on a 15 liter 


non-rebreather on arrival by EMS. Titrated down to 3 liters by oxy-mask and 


saturations then at 96%, but still feeling short of breath. Labs obtained, found


to have leukocytosis with shift, mild anemia, hyperkalemia with potassium of 6.0


and acute kidney failure with BUN of 80 and Cr of 5.40. Baseline Cr runs 1.4 to 


1.7 in the past. Troponin elevated at 0.112 and CPK was 2181. Liver function 


shows AST elevated at 115 and the rest of LFTs normal. Lipase normal. Lactate 


1.8. Calcium IV bolus given for the hyperkalemia. On transfer of care to me, 


awaiting results of non-contrast CT scan of chest/abdomen/pelvis. Patient has 


received low dose Ketamine for pain, sub-sedative dose.





CT scan results show normal lung fields without acute cause of his shortness of 


breath. Abdomen shows short segment of intussiception, without inflammatory 


changes. I reviewed the case with our hospitalist, Dr. Gomez, and with 


our general surgeon, Dr. Deshpande. They both felt that he may need dialysis and 


transfer to other facility would be the best option. Discussed with the patient 


and his wife and daughter. Initially resistant to transfer, but after explaining


the different problems we were facing and potential interventions and outcomes, 


they agreed to transfer and would like everything done that would be needed. 





I called and spoke with Dr. Hunter, hospitalist at [facility], who accepted the 


patient for transfer. We are giving a second liter of normal saline. Adding 


dextrose and insulin for the hyperkalemia as well. D10 with 20units of insulin 


added over the next hour.


Decision to Disposition Date:  Jul 23, 2019


Decision to Disposition Time:  18:59


Transfer Facility


Patient was transferred to [facility] via ambulance.


The transfer was non-emergent, and was required because the capabilities of the 


receiving hospital.


Consent for transfer was obtained from the patient and family.





See EMTALA for transfer orders.


 (ALYSA ROSE MD)





Depart


Departure


Latest Vital Signs





Vital Signs








  Date Time  Temp Pulse Resp B/P (MAP) Pulse Ox O2 Delivery O2 Flow Rate FiO2


 


7/23/19 20:00    88/47 (61)    


 


7/23/19 19:50  92 25  100   


 


7/23/19 15:49 97.0     Oxy Mask 3 





 (ALYSA ROSE MD)


Impression:  


   Primary Impression:  


   Acute renal failure


   Additional Impressions:  


   Hyperkalemia


   Intussusception


   Failure to thrive in adult


Condition:  Condition Unchanged


Disposition:  XFER TO [facility]


Referrals:  


QUIN PELLETIER DO (PCP)





Problem Qualifiers








   Primary Impression:  


   Acute renal failure


   Acute renal failure type:  unspecified  Qualified Codes:  N17.9 - Acute 


   kidney failure, unspecified








ELIZABETH JUAREZ DO           Jul 23, 2019 15:45


ALYSA ROSE MD             Jul 23, 2019 19:31

## 2019-07-24 NOTE — EKG
FACILITY: [facility] 

 

PATIENT NAME: SOLANGE MONTAÑO

: [id number]

MR: [id number]

V: [id number]

EXAM DATE: 

ORDERING PHYSICIAN: ELIZABETH CUELLAR

TECHNOLOGIST: 

 

Test Reason : 

Blood Pressure : ***/*** mmHG

Vent. Rate : 106 BPM     Atrial Rate : 106 BPM

   P-R Int : 124 ms          QRS Dur : 066 ms

    QT Int : 326 ms       P-R-T Axes : 089 -54 092 degrees

   QTc Int : 433 ms

 

Sinus tachycardia

Biatrial enlargement

Abnormal ECG

When compared with ECG of 13-AUG-2017 19:46,

Nonspecific T wave abnormality no longer evident in Inferior leads

Nonspecific T wave abnormality, improved in Lateral leads

Confirmed by ISRRAEL GARCIA ([id number]) on 2019 6:24:37 AM

 

Referred By:             Confirmed By:ISRRAEL GARCIA

## 2019-08-05 ENCOUNTER — TELEPHONE (OUTPATIENT)
Dept: UROLOGY | Facility: CLINIC | Age: 69
End: 2019-08-05

## 2019-08-05 NOTE — TELEPHONE ENCOUNTER
We received order from [place] for parson cath removal. I left message for patient to call back to set up.

## 2019-08-07 NOTE — TELEPHONE ENCOUNTER
Patient's wife called back stated that patient already had cath removed. They are going to [place] today and will call when ready to set up appt.

## 2019-08-08 ENCOUNTER — HOSPITAL ENCOUNTER (OUTPATIENT)
Dept: HOSPITAL 89 - ZZENCOMPAS | Age: 69
End: 2019-08-08
Attending: FAMILY MEDICINE
Payer: COMMERCIAL

## 2019-08-08 VITALS — BODY MASS INDEX: 13.14 KG/M2

## 2019-08-08 DIAGNOSIS — R30.0: Primary | ICD-10-CM

## 2019-08-08 PROCEDURE — 81001 URINALYSIS AUTO W/SCOPE: CPT

## 2019-08-09 ENCOUNTER — HOSPITAL ENCOUNTER (OUTPATIENT)
Dept: HOSPITAL 89 - LAB | Age: 69
End: 2019-08-09
Attending: FAMILY MEDICINE
Payer: COMMERCIAL

## 2019-08-09 VITALS — BODY MASS INDEX: 13.14 KG/M2

## 2019-08-09 DIAGNOSIS — M62.82: Primary | ICD-10-CM

## 2019-08-09 LAB — PLATELET COUNT, AUTOMATED: 373 K/UL (ref 150–450)

## 2019-08-09 PROCEDURE — 36415 COLL VENOUS BLD VENIPUNCTURE: CPT

## 2019-08-09 PROCEDURE — 82040 ASSAY OF SERUM ALBUMIN: CPT

## 2019-08-09 PROCEDURE — 84520 ASSAY OF UREA NITROGEN: CPT

## 2019-08-09 PROCEDURE — 84450 TRANSFERASE (AST) (SGOT): CPT

## 2019-08-09 PROCEDURE — 84155 ASSAY OF PROTEIN SERUM: CPT

## 2019-08-09 PROCEDURE — 84460 ALANINE AMINO (ALT) (SGPT): CPT

## 2019-08-09 PROCEDURE — 84295 ASSAY OF SERUM SODIUM: CPT

## 2019-08-09 PROCEDURE — 84075 ASSAY ALKALINE PHOSPHATASE: CPT

## 2019-08-09 PROCEDURE — 82310 ASSAY OF CALCIUM: CPT

## 2019-08-09 PROCEDURE — 82374 ASSAY BLOOD CARBON DIOXIDE: CPT

## 2019-08-09 PROCEDURE — 84132 ASSAY OF SERUM POTASSIUM: CPT

## 2019-08-09 PROCEDURE — 82247 BILIRUBIN TOTAL: CPT

## 2019-08-09 PROCEDURE — 82435 ASSAY OF BLOOD CHLORIDE: CPT

## 2019-08-09 PROCEDURE — 85025 COMPLETE CBC W/AUTO DIFF WBC: CPT

## 2019-08-09 PROCEDURE — 82565 ASSAY OF CREATININE: CPT

## 2019-08-09 PROCEDURE — 82947 ASSAY GLUCOSE BLOOD QUANT: CPT

## 2019-10-10 PROBLEM — Z71.9 ENCOUNTER FOR CONSULTATION: Status: ACTIVE | Noted: 2019-10-10

## 2019-10-16 PROBLEM — F17.200 CURRENT EVERY DAY SMOKER: Status: ACTIVE | Noted: 2019-10-16

## 2019-10-16 NOTE — PROGRESS NOTES
[facility] Tere Nichole BSN, PA-C  ________________________________________  [facility]  Department of Radiation Oncology  [address] [place] [id number]  Office:  [phone]  Fax: [phone]    DATE:  10/17/2019    PATIENT:   Elder Mina 1950                                   MEDICAL RECORD #:  [id number]                                                       REASON FOR CONSULTATION:  Elder Mina is a very pleasant 69 y.o. 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 Reports fatigue, SOB with exertion, nocturia 1-2x, and arthralgias. Uses O2 at @L. Denies appetite change, unexpected weight change, nausea/vomiting, diarrhea, decreased urine volume, difficulty urinating, dysuria, hematuria, urgency with urination, light-headedness, weakness, and headaches. He follows  at [facility] in [place], [place].     HISTORY OF PRESENT ILLNESS  08/13/2018 -  presented to [facility] ER for evaluation of 3-4 days of hematuria. He endorses fecal urgency and diarrhea as his only associated symptoms. He states the hematuria and diarrhea began spontaneously. CT Abdomen/Pelvis for evaluation revealing retroperitoneal air, free fluid in pelvis, and ruptured viscus. He was transferred to [facility] for further evaluation.     08/13/2018 - CT Abdomen/Pelvis ([facility] interpretation from outside facility):  • Multiple foci of extraluminal gas posterior to the cecum suspected to represent bowel perforation most likely involving the cecum. Medially the appendix appears unremarkable and is thought unlikely to represent the source. No definite mass appreciated within the limitations of unenhanced CT technique; endoscopic  follow-up recommended.  • Abdominopelvic free fluid with a large centrally calcified structure in the pouch of Cyrus. This might potential be related to the above-described bowel perforation, however this is suspected to more likely be chronic and of indeterminate etiology. A clear urinary or biliary source is not identified.  • Borderline cirrhotic hepatic configuration. Probable mild varices adjacent to the distal esophagus and proximal stomach. Nonspecific fat stranding in the upper abdominal mesentery and retroperitoneum. Mild gastrohepatic adenopathy, uncertain etiology, possibly reactive; neoplastic etiology cannot be excluded. Correlation with prior imaging recommended.  • Small hypoattenuating area in the posterior RIGHT hepatic lobe incompletely characterized ; given the borderline cirrhotic configuration and nonemergent follow-up hepatic protocol CT or MRI is recommended for further evaluation. Small renal lesions too small to characterize but statistically likely a cyst.  • Incidental and other findings as described above including right renal stone, small inguinal hernias, degenerative disc disease, atherosclerosis.  • Addendum: Given the presence of mild fat stranding in the upper abdomen in the region of the pancreas, the possibility of mild acute pancreatitis cannot be excluded and biochemical correlation is recommended. A tiny calcination in the region of pancreatic head may indicate sequela of chronic pancreatitis.    08/14/2018 - CT Abdomen/Pelvis:  • Similar in appearance to the prior study, there are findings suspicious for bowel perforation, with extraluminal gas seen adjacent to the cecum and terminal ileum. This was subsequently evaluated at the operating room.  • Small volume of free fluid in the abdomen and pelvis. There is also a rounded radiodensity free-floating within ascitic fluid in the pelvis, which is of on certain etiology.. At the time of dictation, this had been removed at  laparoscopy.  • There are two enhancing lesions arising from the urinary bladder wall, suspicious for bladder neoplasms. These were subsequently evaluated at cystoscopy.  • Morphologic features of cirrhosis. 2.1 cm lesion in the right lobe of the liver, not definitively characterized on this single phase study, but suspicious for hepatocellular carcinoma. Recommend further evaluation with dedicated liver protocol CT or MRI as well as correlation with serum alpha-fetoprotein levels.    08/14/2018 - Cystoscopy biopsy:  URINARY BLADDER, LEFT, TRANSURETHRAL RESECTION:   • NONINVASIVE HIGH GRADE PAPILLARY UROTHELIAL CARCINOMA; NO MUSCULARIS PROPRIA PRESENT.    URINARY BLADER, RIGHT WALL, TRANSURETHRAL RESECTION:  • NONINVASIVE HIGH GRADE PAPILLARY UROTHELIAL CARCINOMA WITH EXTENSIVE CAUTERY ARTIFACT; NO MUSCULARIS PROPRIA PRESENT.       08/15/2018 - Discharged home with follow up appointments scheduled.     09/14/2018 - MRI Abdomen with and without contrast:  • There are 3 liver lesions, as described above. The largest lesion is suspicious for hepatocellular carcinoma. The 2 smaller lesions may be small atypical hepatocellular carcinomas or dysplastic nodules.  • Small cyst in the upper pole left kidney.  • Small amount of ascites. Varices along the proximal stomach.    09/28/2018 - TURBT - [facility]:  Urinary bladder, lateral tumor, transurethral resection:  • Noninvasive, high-grade papillary urothelial carcinoma  • Acute and chronic inflammation  • Muscularis propria is present and negative  Urinary bladder, posterior tumor, transurethral resection:  • Noninvasive, high-grade papillary urothelial carcinoma  • Chronic inflammation  • Muscularis propria is not identified  Urinary bladder, right lateral tumor, transurethral resection:   • Severe cautery artifact  • Intact urothelium is not visualized     01/21/2019 - TURBT - [facility]:  Bladder, posterior tumor #1, transurethral resection:  • Noninvasive high-grade papillary  urothelial carcinoma, pTa.  • Detrusor muscle is not present for evaluation  Bladder, area of right ureteral orifice, biopsy:  • Urothelial carcinoma in situ, pTis.  Bladder, posterior tumor #2, transurethral resection:  • Noninvasive high-grade papillary urothelial carcinoma, pTa.  • Detrusor muscle is not present for evaluation    04/18/2019 - CT Abdomen/Pelvis ([facility] interpretation from outside facility)  • Redemonstration of a treated lesion in segment VII of the right  hepatic lobe, without evidence of arterial enhancement or washout,  compatible with a LR-TR nonviable.  • No new liver lesion is identified. Portal vein is patent, without  evidence of thrombus.  • Cirrhosis, with evidence of portal hypertension    05/20/2019 - TURBT - [facility]:  Urinary bladder, left wall, biopsy:  • Invasive urothelial carcinoma with nested features invading the lamina propria.  • No muscularis propria smooth muscle present for evaluation.  Urinary bladder, posterior wall, biopsy:  • Invasive urothelial carcinoma with nested features invading the lamina propria.  • No muscularis propria smooth muscle present for evaluation.  Urinary bladder, right wall, biopsy:   • Invasive papillary urothelial carcinoma, high-grade.   Comment: The tumor invades the muscularis propria.  Focally, invasive carcinoma is seen involving thin smooth muscle bundles.  It is difficult to distinguish whether these muscle bundles represent muscularis propria that has been partially destroyed by the invasive cancer or muscularis mucosa from the lamina propria.  Additional tissue sampling is recommended.  An extra departmental consultation was obtained from Dr. Jhonatan Parson at [facility].  His diagnostic impressions are reflected in the above diagnoses.  Please refer to the complete pathology report from Dr. Parson which is appended.    07/24/2019 - CT Chest/Abdomen/Pelvis:  • Post procedural changes related to prior TACE in segment seven of   "the right hepatic lobe. There is a nodular focus of arterial enhancement at the posterior margin of the treated lesion, compatible  with LR-TR viable. Please note, on the pretreatment, outside MRI from 9/14/2018, this treated lesion had rim-like arterial phase hyperenhancement, and would have been best characterized as LI-RADS - M.  • 1.2 cm soft tissue nodule along the right posterior bladder wall, decreased in size since 8/14/2018, correlating with one of patient's known bladder neoplasms. A second previously seen lesion along the left posterior bladder wall is no longer clearly definable.  • No findings to suggest metastatic disease in the abdomen or pelvis.  • Small, nonspecific noncalcified nodule in the right upper lobe. Recommend attention on follow-up.  • Numerous additional findings as above.    07/24/2019 - TUBRT at [facility]:  URINARY BLADDER, RIGHT LATERAL WALL, TRANSURETHRAL RESECTION:  • INVASIVE HIGH GRADE UROTHELIAL CARCINOMA.  • TUMOR INVADES MUSCULARIS PROPRIA.  URINARY BLADDER, BASE, TRANSURETHRAL RESECTION:  • UROTHELIAL CARCINOMA IN SITU.  • MUSCULARIS PROPRIA IS PRESENT.    08/2019 -- Most recent hepatic chemoembolization    09/30/2019 - Appointment with  ([facility]):  • Today we discussed potential treatment options.   • Given that it sounds as if he is not felt to be a cystectomy candidate (which seems appropriate), we focused on conversation on concurrent chemo/radiation, radiation alone or less aggressive strategies.   • First we discussed concurrent chemo radiation. I think he may be able to tolerate 5FU/mitomycin with radiation. His counts are a little questionable given his cirrhosis but his overall liver function seems ok. If this is felt to be too aggressive, he could be considered for radiation alone. While not as likely to be curative, it may be a good \"in between\" option for him if he wishes to try to avoid excessive side effects.   • Finally, we " discussed repeat TURBT on occasion without definitive therapy. While this is not felt to be potentially curative, it could potentially slow his time to progression/development of further symptoms.  • Mr. Mina is unsure of the route he wishes to take at this time, He is clear that he needs treatment closer to home so we will refer to oncology in [place].   • He will need a repeat TURBT prior to radiation should he choose this option.    09/30/2019 - Appointment with  (Radiation Oncology)  • Mr. Mina is a 69 year old male with multiple medical co-morbidities with newly diagnosed muscle invasive bladder cancer.   • We discussed the options for management of his bladder cancer including surgery, continued TURBTs, or radiation therapy with or without concurrent chemotherapy.  • Patient is likely a poor cystectomy candidate due to liver dysfunction in the setting of cirrhosis. We discussed that radiation therapy is a reasonable management option, however, continued discussion should be had about goals of care in setting of cirrhosis and other medical co-morbidities.   • Patient to meet Medical Oncology this afternoon to discuss concurrent chemotherapy candidacy.   • Patient wishes to receive treatment closer to home, so will place referral to Radiation Oncology in [place].   • Referral placed to Dr. Tylor Krause in [place], [place] for consultation and treatment planning.    10/01/2019 - CT Abdomen/Pelvis:  • No signs of residual disease, LR TR nonviable.  • No new lesions.  • Signs of portal hypertension as before.  (plans to re-image in 01/2020)    History obtained from  PATIENT and CHART    PAST MEDICAL HISTORY  Past Medical History:   Diagnosis Date   • Anxiety    • Bladder cancer (CMS/HCC)    • Cancer (CMS/HCC)     bladder - karla trotter   • Cataracts, bilateral    • Cirrhosis (CMS/HCC)    • COPD (chronic obstructive pulmonary disease) (CMS/HCC)    • Hematuria    • Liver cancer (CMS/HCC)    • Liver  "cancer (CMS/HCC)    • Urinary retention       PAST SURGICAL HISTORY   Past Surgical History:   Procedure Laterality Date   • CYSTOSCOPY     • ENDOSCOPY N/A 9/20/2018    Procedure: ESOPHAGOGASTRODUODENOSCOPY WITH ANESTHESIA;  Surgeon: Sanford Barahona DO;  Location: [facility] ENDOSCOPY;  Service: Gastroenterology   • EXPLORATORY LAPAROTOMY      \"cyst\" removed   • EYE SURGERY Right     cataract   • LIVER SURGERY  12/06/2018    went up through groin to cancerous liver nodule and cut off its blood supply and injected chemo into it   • TRANSURETHRAL RESECTION OF BLADDER TUMOR N/A 9/28/2018    Procedure: CYSTOSCOPY TRANSURETHRAL RESECTION OF BLADDER TUMOR;  Surgeon: Paul Giang MD;  Location: [facility] OR;  Service: Urology   • TRANSURETHRAL RESECTION OF BLADDER TUMOR N/A 1/21/2019    Procedure: CYSTOSCOPY TRANSURETHRAL RESECTION OF BLADDER TUMOR;  Surgeon: Paul Giang MD;  Location: [facility] OR;  Service: Urology   • TRANSURETHRAL RESECTION OF BLADDER TUMOR N/A 5/20/2019    Procedure: CYSTOSCOPY TRANSURETHRAL RESECTION OF BLADDER TUMOR;  Surgeon: Paul Giang MD;  Location: [facility] OR;  Service: Urology      FAMILY HISTORY  family history includes Cirrhosis in his mother; Colon cancer in his father; Emphysema in his brother; Liver disease in his brother and brother.     SOCIAL HISTORY   Social History     Tobacco Use   • Smoking status: Current Every Day Smoker     Packs/day: 1.00     Years: 40.00     Pack years: 40.00   • Smokeless tobacco: Never Used   Substance Use Topics   • Alcohol use: No     Frequency: Never   • Drug use: No     ALLERGIES  Penicillins     MEDICATIONS   Current Outpatient Medications   Medication Sig Dispense Refill   • ALPRAZolam (XANAX) 1 MG tablet Take 1 mg by mouth 2 (Two) Times a Day As Needed for Anxiety.     • budesonide-formoterol (SYMBICORT) 160-4.5 MCG/ACT inhaler Inhale 2 puffs Daily.     • ipratropium-albuterol (COMBIVENT RESPIMAT)  MCG/ACT inhaler Inhale 1 puff " "4 (Four) Times a Day As Needed for Wheezing.     • oxyCODONE-acetaminophen (PERCOCET) 5-325 MG per tablet Take 1 tablet by mouth Every 6 (Six) Hours As Needed for Severe Pain . 12 tablet 0     No current facility-administered medications for this visit.       The following portions of the patient's history were reviewed and updated as appropriate: allergies, current medications, past family history, past medical history, past social history, past surgical history and problem list.    REVIEW OF SYSTEMS  Review of Systems   Constitutional: Positive for fatigue. Negative for appetite change and unexpected weight change.   HENT: Negative.    Eyes: Negative.    Respiratory: Positive for shortness of breath (with exertion).         02@2L   Gastrointestinal: Negative for anal bleeding, diarrhea, nausea and vomiting.   Endocrine: Negative.    Genitourinary: Negative for decreased urine volume, difficulty urinating, dysuria, hematuria and urgency.        Nocturia 1-2x     Musculoskeletal: Positive for arthralgias.   Skin: Negative.    Allergic/Immunologic: Negative.    Neurological: Negative.    Hematological: Negative.    Psychiatric/Behavioral: Negative.      PHYSICAL EXAM  VITAL SIGNS:   Vitals:    10/17/19 1351   BP: 126/76   Weight: 74.8 kg (165 lb)   Height: 177 cm (69.69\")   PainSc: 0-No pain     General:  Alert and oriented, no acute distress, Vitals reviewed.  Head/Neck:  Normocephalic, without obvious abnormality, atraumatic.  The trachea is midline.   Nose/Sinuses:  Nares normal. Septum midline. Mucosa normal. No drainage or sinus tenderness.  Mouth/Throat:  Mucosa moist, no lesions; pharynx without erythema, edema or exudate.  Neck:  supple, no mass, non-tender  Eyes: No gross abnormalities,  no scleral jaundice or erythema.    Ears: Ears appear intact with no abnormalities noted, hearing grossly normal  Chest:  Respiratory efforts are normal and unlabored, chest is clear to auscultation. Diminished breath sounds " in lower lobes, wears oxygen per NC.  Cardiovascular: Regular rate and rhythm without murmurs, rubs, or gallops.   Abdomen:  Soft, non-tender, normal bowel sounds; no noted organomegaly or masses. no CVA tenderness   Extremities:  YO well, warm to touch, no evidence of cyanosis or edema.  Lymphatics:  No evidence of adenopathy in the cervical or supraclavicular areas.  Skin: No suspicious lesions or rashes of concern, skin color, texture, turgor are normal  Neurologic:  Alert and oriented, strength and sensation grossly normal  Psych: Mood and affect are appropriate for circumstance. Eye contact is appropriate.     Performance Status: ECOG (0) Fully active, able to carry on all predisease performance without restriction    Clinical Quality Measures  Elder Mina reports a pain score of 0/10. Given his pain assessment as noted, treatment options were discussed and the following options were decided upon as a follow-up plan to address the patient's pain: no pain, no plan given.  -Advanced Care Planning Care plan discussed, no care plan provided  -Body Mass Index Screening and Follow-Up Plan Patient's Body mass index is 23.89 kg/m². BMI is within normal parameters. No follow-up required..  -Tobacco Use: Screening and Cessation Intervention Social History    Tobacco Use      Smoking status: Current Every Day Smoker        Packs/day: 1.00        Years: 40.00        Pack years: 40      Smokeless tobacco: Never Used   Smoking cessation information given in after visit summary.    ASSESSMENT AND PLAN   1. Malignant neoplasm of urinary bladder, unspecified site (CMS/HCC)    2. Current every day smoker    3. Encounter for consultation         Orders Placed This Encounter   Procedures   • Ambulatory Referral to Hematology / Oncology   • Ambulatory Referral to Urology     RECOMMENDATIONS: Elder Mina is a very pleasant 69 y.o. male that has been referred to our clinic by Guille Pinto MD to discuss radiotherapy  "recommendations for muscle invasive High-Grade Urothelial Carcinoma of the Bladder, likely T2 N0, in the setting of recently chemoemolized liver lesion thought to be hepatocellular carcinoma. On 07/24/2019, Mr Mina underwent a TUBRT at [facility]. Pathology of the right lateral urinary bladder wall was positive for invasive high-grade urothelial carcinoma with invasion of the muscular propria with urothelial carcinoma in situ present at the bladder base.    He was seen by Dr. Dawn at [facility] on 09/30/2019. According to his note, 'he is not felt to be a cystectomy candidate and they discussed concurrent chemo/radiation, radiation alone or less aggressive strategies to include serial TURBTs. Concurrent chemoradiation. 5FU/mitomycin with radiation was recommended even with his history of cirrhosis since his overall liver function seems ok; if this is felt to be too aggressive, he could be considered for radiation alone. While not as likely to be curative, it may be a good \"in between\" option for him if he wishes to try to avoid excessive side effects.  While this is not felt to be potentially curative, repeat TURBT on occasion without definitive therapy could potentially slow his time to progression/development of further symptoms. If radiation is his choice, he will need a repeat TURBT prior to initiation of radiation.  Mr. Mina was unsure of the route he wished to take but is clear that he wanted treatment closer to home.'    We have discussed the indications and rationale of radiation therapy according to the NCCN Guidelines. I have extensively reviewed the risks, benefits and alternatives of therapy and progression of disease in spite of therapy with either local or systemic failure.  I have seen, examined and reviewed this patient's medication list, appropriate labs and imaging studies, reviewed relevant medical records and other physicians notes and discussed the goals/plans " of care with the patient and family and answered all questions.  I agree with previously mentioned recommendations.  In the absence of definitive surgical options, definitive intent concomitant chemotherapy and radiation therapy versus radiation therapy alone can be attempted if patient wishes to undergo treatment at this time.  I explained the intent, benefits, course, precautions, and side effects (acute skin reactions, irritative urinary symptoms and possible urinary obstruction, altered bowel movement patterns, fistula formation, scar tissue formation, and others) of pelvic radiation therapy.    I will refer Mr. Mina to medical oncology for evaluation regarding possible definitive chemoradiation.  Patient will need to be seen by his urologist for maximum tumor resection/TURBT prior to CT simulation if he chooses definitive intent radiation radiation therapy options. Mr Mina was a patient of Dr. Giang (local URO) who has now moved, Mr. Mina will decide to return to a local urologist in the same office or to return to the urology service at [facility] for continued surveillance/TURBTs.  He would also follow-up at [facility] for surveillance of recently treated liver lesions.    The patient verbalizes understanding of this discussion and voices no further questions. Will return for follow-up in 2 weeks, hopefully he will have seen the other physicians so that we can discuss the next step in his treatment course.     Thank you for allowing me to assist in his care.    Elder Mina is a current cigarettes user.  He currently smokes 1 pack of cigarettes per day for a duration of 40 years. I have educated him on the risk of diseases from using tobacco products such as cancer, COPD and heart diease. I advised him to quit and he is not willing to quit. I spent >10 minutes counseling the patient.    Todays appointment time was spent in counseling and coordination of care as follows: diagnosis, intent of  treatment discussing radiation therapy specifics: logistics, possible and probable side effects and after effects, staging of cancer, standard of care in for this stage of this cancer and treatment options.  I saw this patient in consultation with Tere Dumont PA-C while covering for Dr. Tylor Krause, radiation oncologist.  Magno Perkins MD   10/17/2019

## 2019-10-17 ENCOUNTER — CONSULT (OUTPATIENT)
Dept: RADIATION ONCOLOGY | Facility: HOSPITAL | Age: 69
End: 2019-10-17

## 2019-10-17 ENCOUNTER — HOSPITAL ENCOUNTER (OUTPATIENT)
Dept: RADIATION ONCOLOGY | Facility: HOSPITAL | Age: 69
Setting detail: RADIATION/ONCOLOGY SERIES
End: 2019-10-17

## 2019-10-17 VITALS
DIASTOLIC BLOOD PRESSURE: 76 MMHG | BODY MASS INDEX: 23.62 KG/M2 | WEIGHT: 165 LBS | SYSTOLIC BLOOD PRESSURE: 126 MMHG | HEIGHT: 70 IN

## 2019-10-17 DIAGNOSIS — C67.9 MALIGNANT NEOPLASM OF URINARY BLADDER, UNSPECIFIED SITE (HCC): Primary | ICD-10-CM

## 2019-10-17 DIAGNOSIS — Z71.9 ENCOUNTER FOR CONSULTATION: ICD-10-CM

## 2019-10-17 DIAGNOSIS — F17.200 CURRENT EVERY DAY SMOKER: ICD-10-CM

## 2019-10-31 ENCOUNTER — APPOINTMENT (OUTPATIENT)
Dept: LAB | Facility: HOSPITAL | Age: 69
End: 2019-10-31

## 2019-10-31 ENCOUNTER — OFFICE VISIT (OUTPATIENT)
Dept: UROLOGY | Facility: CLINIC | Age: 69
End: 2019-10-31

## 2019-10-31 ENCOUNTER — CONSULT (OUTPATIENT)
Dept: ONCOLOGY | Facility: CLINIC | Age: 69
End: 2019-10-31

## 2019-10-31 VITALS
TEMPERATURE: 98.8 F | SYSTOLIC BLOOD PRESSURE: 116 MMHG | HEIGHT: 68 IN | HEART RATE: 94 BPM | WEIGHT: 166.9 LBS | OXYGEN SATURATION: 90 % | DIASTOLIC BLOOD PRESSURE: 58 MMHG | RESPIRATION RATE: 16 BRPM | BODY MASS INDEX: 25.29 KG/M2

## 2019-10-31 VITALS — WEIGHT: 168 LBS | BODY MASS INDEX: 25.46 KG/M2 | HEIGHT: 68 IN | TEMPERATURE: 98.2 F

## 2019-10-31 DIAGNOSIS — C67.2 CANCER OF LATERAL WALL OF URINARY BLADDER (HCC): Primary | ICD-10-CM

## 2019-10-31 DIAGNOSIS — C67.9 MALIGNANT NEOPLASM OF URINARY BLADDER, UNSPECIFIED SITE (HCC): Primary | ICD-10-CM

## 2019-10-31 DIAGNOSIS — C67.9 MALIGNANT NEOPLASM OF URINARY BLADDER, UNSPECIFIED SITE (HCC): ICD-10-CM

## 2019-10-31 PROBLEM — C22.0 HEPATOCELLULAR CARCINOMA (HCC): Status: ACTIVE | Noted: 2018-11-28

## 2019-10-31 PROBLEM — J96.91 RESPIRATORY FAILURE WITH HYPOXIA (HCC): Status: ACTIVE | Noted: 2019-08-09

## 2019-10-31 PROBLEM — R10.84 GENERALIZED ABDOMINAL PAIN: Status: ACTIVE | Noted: 2018-08-13

## 2019-10-31 PROBLEM — J44.9 COPD (CHRONIC OBSTRUCTIVE PULMONARY DISEASE) (HCC): Status: ACTIVE | Noted: 2019-08-08

## 2019-10-31 LAB
ALBUMIN SERPL-MCNC: 4.3 G/DL (ref 3.5–5.2)
ALBUMIN/GLOB SERPL: 1.1 G/DL
ALP SERPL-CCNC: 121 U/L (ref 39–117)
ALT SERPL W P-5'-P-CCNC: 28 U/L (ref 1–41)
ANION GAP SERPL CALCULATED.3IONS-SCNC: 9 MMOL/L (ref 5–15)
AST SERPL-CCNC: 29 U/L (ref 1–40)
BASOPHILS # BLD AUTO: 0.04 10*3/MM3 (ref 0–0.2)
BASOPHILS NFR BLD AUTO: 0.5 % (ref 0–1.5)
BILIRUB BLD-MCNC: NEGATIVE MG/DL
BILIRUB SERPL-MCNC: 0.4 MG/DL (ref 0.2–1.2)
BUN BLD-MCNC: 21 MG/DL (ref 8–23)
BUN/CREAT SERPL: 26.6 (ref 7–25)
CALCIUM SPEC-SCNC: 9.6 MG/DL (ref 8.6–10.5)
CHLORIDE SERPL-SCNC: 101 MMOL/L (ref 98–107)
CLARITY, POC: CLEAR
CO2 SERPL-SCNC: 33 MMOL/L (ref 22–29)
COLOR UR: YELLOW
CREAT BLD-MCNC: 0.79 MG/DL (ref 0.76–1.27)
DEPRECATED RDW RBC AUTO: 51.9 FL (ref 37–54)
EOSINOPHIL # BLD AUTO: 0.2 10*3/MM3 (ref 0–0.4)
EOSINOPHIL NFR BLD AUTO: 2.4 % (ref 0.3–6.2)
ERYTHROCYTE [DISTWIDTH] IN BLOOD BY AUTOMATED COUNT: 14.1 % (ref 12.3–15.4)
GFR SERPL CREATININE-BSD FRML MDRD: 97 ML/MIN/1.73
GLOBULIN UR ELPH-MCNC: 3.9 GM/DL
GLUCOSE BLD-MCNC: 67 MG/DL (ref 65–99)
GLUCOSE UR STRIP-MCNC: NEGATIVE MG/DL
HCT VFR BLD AUTO: 45.6 % (ref 37.5–51)
HGB BLD-MCNC: 15.4 G/DL (ref 13–17.7)
HOLD SPECIMEN: NORMAL
HOLD SPECIMEN: NORMAL
KETONES UR QL: NEGATIVE
LEUKOCYTE EST, POC: ABNORMAL
LYMPHOCYTES # BLD AUTO: 2.44 10*3/MM3 (ref 0.7–3.1)
LYMPHOCYTES NFR BLD AUTO: 29 % (ref 19.6–45.3)
MCH RBC QN AUTO: 33.6 PG (ref 26.6–33)
MCHC RBC AUTO-ENTMCNC: 33.8 G/DL (ref 31.5–35.7)
MCV RBC AUTO: 99.3 FL (ref 79–97)
MONOCYTES # BLD AUTO: 1.23 10*3/MM3 (ref 0.1–0.9)
MONOCYTES NFR BLD AUTO: 14.6 % (ref 5–12)
NEUTROPHILS # BLD AUTO: 4.48 10*3/MM3 (ref 1.7–7)
NEUTROPHILS NFR BLD AUTO: 53.1 % (ref 42.7–76)
NITRITE UR-MCNC: NEGATIVE MG/ML
PH UR: 6.5 [PH] (ref 5–8)
PLATELET # BLD AUTO: 96 10*3/MM3 (ref 140–450)
PMV BLD AUTO: 10.4 FL (ref 6–12)
POTASSIUM BLD-SCNC: 4.2 MMOL/L (ref 3.5–5.2)
PROT SERPL-MCNC: 8.2 G/DL (ref 6–8.5)
PROT UR STRIP-MCNC: NEGATIVE MG/DL
RBC # BLD AUTO: 4.59 10*6/MM3 (ref 4.14–5.8)
RBC # UR STRIP: ABNORMAL /UL
SODIUM BLD-SCNC: 143 MMOL/L (ref 136–145)
SP GR UR: 1.02 (ref 1–1.03)
UROBILINOGEN UR QL: NORMAL
WBC NRBC COR # BLD: 8.42 10*3/MM3 (ref 3.4–10.8)

## 2019-10-31 PROCEDURE — 99205 OFFICE O/P NEW HI 60 MIN: CPT | Performed by: INTERNAL MEDICINE

## 2019-10-31 PROCEDURE — 81001 URINALYSIS AUTO W/SCOPE: CPT | Performed by: UROLOGY

## 2019-10-31 PROCEDURE — 85025 COMPLETE CBC W/AUTO DIFF WBC: CPT | Performed by: INTERNAL MEDICINE

## 2019-10-31 PROCEDURE — 36415 COLL VENOUS BLD VENIPUNCTURE: CPT | Performed by: INTERNAL MEDICINE

## 2019-10-31 PROCEDURE — 80053 COMPREHEN METABOLIC PANEL: CPT | Performed by: INTERNAL MEDICINE

## 2019-10-31 PROCEDURE — 99213 OFFICE O/P EST LOW 20 MIN: CPT | Performed by: UROLOGY

## 2019-10-31 PROCEDURE — 52000 CYSTOURETHROSCOPY: CPT | Performed by: UROLOGY

## 2019-10-31 NOTE — PROGRESS NOTES
"Subjective    Mr. Mina is 69 y.o. male    Chief Complaint: Bladder Cancer    History of Present Illness  70yo male with cirrhosis and liver cancer previously diagnosed with T2 bladder cancer by Dr. Giang seen at [place] and deemed to not be cystectomy candidate.  He has rad onc and is seeing medical oncology today in preparation for bladder conserving treatment.  He had last TURBT in July by Dr. Dawn at [facility] and would like another TURBT done prior to starting XRT/chemo.     Review of Systems   Constitutional: Negative for chills and fever.   Gastrointestinal: Negative for abdominal pain, anal bleeding and blood in stool.   Genitourinary: Negative for dysuria, frequency, hematuria and urgency.         Current Outpatient Medications:   •  ALPRAZolam (XANAX) 1 MG tablet, Take 1 mg by mouth 2 (Two) Times a Day As Needed for Anxiety., Disp: , Rfl:   •  budesonide-formoterol (SYMBICORT) 160-4.5 MCG/ACT inhaler, Inhale 2 puffs Daily., Disp: , Rfl:   •  ipratropium-albuterol (COMBIVENT RESPIMAT)  MCG/ACT inhaler, Inhale 1 puff 4 (Four) Times a Day As Needed for Wheezing., Disp: , Rfl:   •  oxyCODONE-acetaminophen (PERCOCET) 5-325 MG per tablet, Take 1 tablet by mouth Every 6 (Six) Hours As Needed for Severe Pain ., Disp: 12 tablet, Rfl: 0    Past Medical History:   Diagnosis Date   • Anxiety    • Bladder cancer (CMS/HCC)    • Cancer (CMS/HCC)     bladder - karla trotter   • Cataracts, bilateral    • Cirrhosis (CMS/HCC)    • COPD (chronic obstructive pulmonary disease) (CMS/HCC)    • Hematuria    • Liver cancer (CMS/HCC)    • Liver cancer (CMS/HCC)    • Urinary retention        Past Surgical History:   Procedure Laterality Date   • CYSTOSCOPY     • ENDOSCOPY N/A 9/20/2018    Procedure: ESOPHAGOGASTRODUODENOSCOPY WITH ANESTHESIA;  Surgeon: Sanford Barahona DO;  Location: [facility] ENDOSCOPY;  Service: Gastroenterology   • EXPLORATORY LAPAROTOMY      \"cyst\" removed   • EYE SURGERY Right     cataract   • " "LIVER SURGERY  12/06/2018    went up through groin to cancerous liver nodule and cut off its blood supply and injected chemo into it   • TRANSURETHRAL RESECTION OF BLADDER TUMOR N/A 9/28/2018    Procedure: CYSTOSCOPY TRANSURETHRAL RESECTION OF BLADDER TUMOR;  Surgeon: Paul Giang MD;  Location: [facility] OR;  Service: Urology   • TRANSURETHRAL RESECTION OF BLADDER TUMOR N/A 1/21/2019    Procedure: CYSTOSCOPY TRANSURETHRAL RESECTION OF BLADDER TUMOR;  Surgeon: Paul Giang MD;  Location:  [facility] OR;  Service: Urology   • TRANSURETHRAL RESECTION OF BLADDER TUMOR N/A 5/20/2019    Procedure: CYSTOSCOPY TRANSURETHRAL RESECTION OF BLADDER TUMOR;  Surgeon: Paul Giang MD;  Location:  [facility] OR;  Service: Urology       Social History     Socioeconomic History   • Marital status:      Spouse name: Not on file   • Number of children: Not on file   • Years of education: Not on file   • Highest education level: Not on file   Tobacco Use   • Smoking status: Current Every Day Smoker     Packs/day: 1.00     Years: 40.00     Pack years: 40.00   • Smokeless tobacco: Never Used   Substance and Sexual Activity   • Alcohol use: No     Frequency: Never   • Drug use: No   • Sexual activity: Defer       Family History   Problem Relation Age of Onset   • Colon cancer Father    • Cirrhosis Mother    • Liver disease Brother    • Liver disease Brother    • Emphysema Brother        Objective    Temp 98.2 °F (36.8 °C)   Ht 172.7 cm (68\")   Wt 76.2 kg (168 lb)   BMI 25.54 kg/m²     Physical Exam  Pre- operative diagnosis:  Bladder cancer surveillance    Post operative diagnosis:  Same    Procedure:  The patient was prepped and draped in a normal sterile fashion.  The urethra was anesthetized with 2% lidocaine jelly.  A flexible cystoscope was introduced per urethra.      Urethra:  Normal    Bladder:  Bladder tumor right lateral wall and posterior walls.      Ureteral orifices:  Normal position " bilaterally    Prostate:  lateral lobe hypertrophy    Patient tolerated the procedure well    Complications: none    Blood loss: minimal    Follow up:    Schedule for OR  TURBT      Results for orders placed or performed during the hospital encounter of 05/20/19   Tissue Pathology Exam   Result Value Ref Range    Case Report       Surgical Pathology Report                         Case: [id number]                                  Authorizing Provider:  Paul Giang MD    Collected:           05/20/2019 09:28 AM          Ordering Location:     [facility] OR  Received:            05/20/2019 11:08 AM          Pathologist:           Venus Nunez MD                                                        Specimens:   1) - Urinary Bladder, left bladder wall                                                             2) - Urinary Bladder, posterior bladder wall                                                        3) - Urinary Bladder, right bladder wall                                                   Final Diagnosis       1.  Urinary bladder, left wall, biopsy:  A.  Invasive urothelial carcinoma with nested features invading the lamina propria.  B.  No muscularis propria smooth muscle present for evaluation.    2.  Urinary bladder, posterior wall, biopsy:  A.  Invasive urothelial carcinoma with nested features invading the lamina propria.  B.  No muscularis propria smooth muscle present for evaluation.    3.  Urinary bladder, right wall, biopsy: Invasive papillary urothelial carcinoma, high-grade.    Comment: The tumor invades the muscularis propria.  Focally, invasive carcinoma is seen involving thin smooth muscle bundles.  It is difficult to distinguish whether these muscle bundles represent muscularis propria that has been partially destroyed by the invasive cancer or muscularis mucosa from the lamina propria.  Additional tissue sampling is recommended.    An extra departmental consultation  "was obtained from Dr. Jhonatan Parson at [facility].  His diagnostic impressions are reflected in the above diagnoses.  Please refer to the complete pathology report from Dr. Parson which is appended.    AJCC stage: At least pT1 pNX      Gross Description       Specimen #1 is received in a formalin filled container, labeled with the patient's name, date of birth, and \"left bladder wall biopsy\".  The specimen consists of 2 tan-pink, mucosa covered tissue fragments aggregating to 0.6 x 0.5 x 0.2 cm.  The mucosal surfaces appear erythematous and otherwise unremarkable.  The specimen is totally submitted in block 1A.    Specimen #2 is received in a formalin filled container, labeled with the patient's name, date of birth, and \"posterior bladder wall biopsy\".  The specimen consists of one mucosa covered tissue fragment measuring 0.5 x 0.3 x 0.2 cm.  The mucosal surface is diffusely red-tan, erythematous and raised.  The specimen is totally submitted in block 2A.    Specimen #3 is received in a formalin filled container, labeled with the patient's name, date of birth, and \"right bladder wall biopsy\".  The specimen consists of 2 yellow-pink mucosa covered tissue fragments aggregating to 0.4 x 0.2 x 0.2 cm.  The mucosal surfaces appear slightly erythematous and otherwise unremarkable.  The specimen is totally submitted in block 3A.      Microscopic Description       1.  Sections of the left bladder wall biopsy reveal histologic changes of invasive urothelial carcinoma with nested features.  Invasion into the lamina propria by closely spaced, irregular nests of urothelial cells is seen.  The urothelial cells demonstrate mild cytologic atypia with occasional cells demonstrating more enlarged nuclei.  There is no muscularis propria present.  Cautery artifact is seen.    2.  Sections of the posterior bladder wall biopsy reveal invasive urothelial carcinoma with nested features.  A chronic inflammatory infiltrate " is present.  Lamina propria invasion is seen.  There is no muscularis propria smooth muscle present.    3.  Sections of the right bladder wall biopsy reveal invasive high-grade papillary urothelial carcinoma.  Lamina propria invasion is present.  Chronic inflammation is seen.  The invasive carcinoma focally involves thin smooth muscle bundles.  It is difficult to distinguish whether these muscle bundles represent muscularis propria that has been partially destroyed by the invasive cancer or muscularis mucosa from the lamina propria.       Patient's Body mass index is 25.54 kg/m². BMI is within normal parameters. No follow-up required..    International Prostate Symptom Score  The following is posted based on patient questionnaire answers:  0 - not at all    1-7 mild symptoms  1- Less than one time in five  8-19 moderate symptoms  2 -Less than half the time  20-35 severe symptoms  3 - About half the time  4 - More than half the time  5 - Almost always     For following sections:  Incomplete Emptying: - How often have you had the sensation  of not emptying your bladder completely after you finished urinating?  0  Frequency: -How often have you had to urinate again less than   two hours after you finished urinating?      1  Intermittency: -How often have you found you stopped and started again  Several times when you urinate?       1  Urgency: -How often do you find it difficult to postpone urination?             0  Weak stream: - How often have you had a weak urinary stream?             2  Straining: - How often have you had to push or strain to begin  Urination?          0  Sleeping: -How many times did you most typically get up to urinate   From the time you went to bed at night until the time you got up in the   1  Morning          Total `  5    Quality of Life  How would you feel if you had to live with your urinary condition the way   1  It is now, no better, no worse for the rest of your life?    Where:  0=delighted; 1= pleased, 2= mostly satisfied, 3= mixed, 4 = mostly  Dissatisfied, 5= Unhappy, 6 = terrible    Assessment and Plan    Diagnoses and all orders for this visit:    Cancer of lateral wall of urinary bladder (CMS/HCC)  -     Case Request; Standing  -     ECG 12 Lead; Future  -     levoFLOXacin (LEVAQUIN) 500 mg in sodium chloride 0.9 % 150 mL IVPB  -     Case Request    Malignant neoplasm of urinary bladder, unspecified site (CMS/[facility])  -     POC Urinalysis Dipstick, Multipro    Other orders  -     Follow Anesthesia Guidelines / Standing Orders; Future  -     Obtain Informed Consent; Future  -     Provide NPO Instructions to Patient; Future  -     Chlorhexidine Skin Prep; Future  -     Follow Anesthesia Guidelines / Standing Orders; Standing  -     Verify / Perform Chlorhexidine Skin Prep; Standing  -     Verify / Perform Chlorhexidine Skin Prep if Indicated (If Not Already Completed); Standing        He has appt today with medical oncology.  He would like TURBT done.  Discussed risks for infection, bleeding, injury to bladder/perforation, need for catheter, inability to cure, need for additional procedures, complications of anesthesia.  He voiced understanding and provided informed consent to proceed 11/15/19.

## 2019-11-01 ENCOUNTER — TELEPHONE (OUTPATIENT)
Dept: ONCOLOGY | Facility: CLINIC | Age: 69
End: 2019-11-01

## 2019-11-01 ENCOUNTER — HOSPITAL ENCOUNTER (OUTPATIENT)
Dept: RADIATION ONCOLOGY | Facility: HOSPITAL | Age: 69
Setting detail: RADIATION/ONCOLOGY SERIES
End: 2019-11-01

## 2019-11-01 NOTE — TELEPHONE ENCOUNTER
----- Message from Logan Resendiz MD sent at 11/1/2019  1:49 PM CDT -----  Please get auth for his immunotherapy.tnx.

## 2019-11-01 NOTE — TELEPHONE ENCOUNTER
Informed  Gabriela to jordana cochran for patient to come in for Patient Education regarding his txt in the future, she will call and inform patient of time and date

## 2019-11-01 NOTE — TELEPHONE ENCOUNTER
----- Message from Logan Resendiz MD sent at 11/1/2019  1:54 PM CDT -----  plz have pt see ALI for chemo teaching. tnx.

## 2019-11-04 ENCOUNTER — TELEPHONE (OUTPATIENT)
Dept: ONCOLOGY | Facility: CLINIC | Age: 69
End: 2019-11-04

## 2019-11-04 PROBLEM — C67.2 CANCER OF LATERAL WALL OF URINARY BLADDER (HCC): Status: ACTIVE | Noted: 2019-11-04

## 2019-11-06 ENCOUNTER — HOSPITAL ENCOUNTER (OUTPATIENT)
Dept: CT IMAGING | Facility: HOSPITAL | Age: 69
Discharge: HOME | End: 2019-11-06
Admitting: INTERNAL MEDICINE

## 2019-11-06 ENCOUNTER — APPOINTMENT (OUTPATIENT)
Dept: PREADMISSION TESTING | Facility: HOSPITAL | Age: 69
End: 2019-11-06

## 2019-11-06 ENCOUNTER — OFFICE VISIT (OUTPATIENT)
Dept: ONCOLOGY | Facility: CLINIC | Age: 69
End: 2019-11-06

## 2019-11-06 VITALS
DIASTOLIC BLOOD PRESSURE: 79 MMHG | HEART RATE: 89 BPM | SYSTOLIC BLOOD PRESSURE: 122 MMHG | WEIGHT: 167.99 LBS | BODY MASS INDEX: 24.05 KG/M2 | HEIGHT: 70 IN | RESPIRATION RATE: 24 BRPM | OXYGEN SATURATION: 88 %

## 2019-11-06 VITALS
HEIGHT: 68 IN | TEMPERATURE: 97.3 F | SYSTOLIC BLOOD PRESSURE: 126 MMHG | BODY MASS INDEX: 25.45 KG/M2 | DIASTOLIC BLOOD PRESSURE: 76 MMHG | HEART RATE: 80 BPM | WEIGHT: 167.9 LBS | OXYGEN SATURATION: 90 % | RESPIRATION RATE: 16 BRPM

## 2019-11-06 DIAGNOSIS — J44.9 CHRONIC OBSTRUCTIVE PULMONARY DISEASE, UNSPECIFIED COPD TYPE (HCC): ICD-10-CM

## 2019-11-06 DIAGNOSIS — C67.2 MALIGNANT NEOPLASM OF LATERAL WALL OF URINARY BLADDER (HCC): ICD-10-CM

## 2019-11-06 DIAGNOSIS — C67.2 CANCER OF LATERAL WALL OF URINARY BLADDER (HCC): ICD-10-CM

## 2019-11-06 DIAGNOSIS — C67.9 MALIGNANT NEOPLASM OF URINARY BLADDER, UNSPECIFIED SITE (HCC): ICD-10-CM

## 2019-11-06 DIAGNOSIS — R53.83 FATIGUE, UNSPECIFIED TYPE: ICD-10-CM

## 2019-11-06 DIAGNOSIS — C22.0 HEPATOCELLULAR CARCINOMA (HCC): Primary | ICD-10-CM

## 2019-11-06 LAB
CREAT BLDA-MCNC: 0.8 MG/DL (ref 0.6–1.3)
DEPRECATED RDW RBC AUTO: 51.8 FL (ref 37–54)
ERYTHROCYTE [DISTWIDTH] IN BLOOD BY AUTOMATED COUNT: 13.9 % (ref 12.3–15.4)
HCT VFR BLD AUTO: 45 % (ref 37.5–51)
HGB BLD-MCNC: 15 G/DL (ref 13–17.7)
MCH RBC QN AUTO: 33.4 PG (ref 26.6–33)
MCHC RBC AUTO-ENTMCNC: 33.3 G/DL (ref 31.5–35.7)
MCV RBC AUTO: 100.2 FL (ref 79–97)
PLATELET # BLD AUTO: 90 10*3/MM3 (ref 140–450)
PMV BLD AUTO: 10.7 FL (ref 6–12)
RBC # BLD AUTO: 4.49 10*6/MM3 (ref 4.14–5.8)
WBC NRBC COR # BLD: 7.97 10*3/MM3 (ref 3.4–10.8)

## 2019-11-06 PROCEDURE — 93010 ELECTROCARDIOGRAM REPORT: CPT | Performed by: INTERNAL MEDICINE

## 2019-11-06 PROCEDURE — 99214 OFFICE O/P EST MOD 30 MIN: CPT | Performed by: NURSE PRACTITIONER

## 2019-11-06 PROCEDURE — 25010000002 IOPAMIDOL 61 % SOLUTION: Performed by: INTERNAL MEDICINE

## 2019-11-06 PROCEDURE — 85027 COMPLETE CBC AUTOMATED: CPT | Performed by: UROLOGY

## 2019-11-06 PROCEDURE — 71260 CT THORAX DX C+: CPT

## 2019-11-06 PROCEDURE — 36415 COLL VENOUS BLD VENIPUNCTURE: CPT

## 2019-11-06 PROCEDURE — 0 IOHEXOL 300 MG/ML SOLUTION: Performed by: INTERNAL MEDICINE

## 2019-11-06 PROCEDURE — 74177 CT ABD & PELVIS W/CONTRAST: CPT

## 2019-11-06 PROCEDURE — 93005 ELECTROCARDIOGRAM TRACING: CPT

## 2019-11-06 PROCEDURE — 82565 ASSAY OF CREATININE: CPT

## 2019-11-06 RX ADMIN — IOHEXOL 50 ML: 300 INJECTION, SOLUTION INTRAVENOUS at 08:29

## 2019-11-06 RX ADMIN — IOPAMIDOL 100 ML: 612 INJECTION, SOLUTION INTRAVENOUS at 08:07

## 2019-11-06 NOTE — PROGRESS NOTES
[facility]  HEMATOLOGY & ONCOLOGY    [facility] ON[facility] HEMATOLOGY AND ONCOLOGY  [address]  [place] [address] [phone]    Patient Name: Elder Mina  Encounter Date: 11/06/2019  YOB: 1950  Patient Number: [id number]    Subjective     REASON FOR VISIT:     Chief Complaint   Patient presents with   • Bladder Cancer     Here to discuss tx   • Depression Screening     No depression           ONCOLOGY HISTORY     Malignant neoplasm of urinary bladder (CMS/HCC)     Initial Diagnosis     Malignant neoplasm of urinary bladder (CMS/HCC)         8/13/2018 Imaging     CT Abd/Pelvis (Adal interpretation from outside facility):  1.  Multiple foci of extraluminal gas posterior to the cecum   suspected to represent bowel perforation most likely involving the   cecum. Medially the appendix appears unremarkable and is thought   unlikely to represent the source. No definite mass appreciated within   the limitations of unenhanced CT technique; endoscopic follow-up   recommended.  2.  Abdominopelvic free fluid with a large centrally calcified   structure in the pouch of Cyrus. This might potential be related to   the above-described bowel perforation, however this is suspected to   more likely be chronic and of indeterminate etiology. A clear urinary   or biliary source is not identified.  3.  Borderline cirrhotic hepatic configuration. Probable mild varices   adjacent to the distal esophagus and proximal stomach. Nonspecific   fat stranding in the upper abdominal mesentery and retroperitoneum.   Mild gastrohepatic adenopathy, uncertain etiology, possibly reactive;   neoplastic etiology cannot be excluded. Correlation with prior   imaging recommended.  4.  Small hypoattenuating area in the posterior RIGHT hepatic lobe   incompletely characterized ; given the borderline cirrhotic   configuration and nonemergent follow-up  hepatic protocol CT or MRI is   recommended for further evaluation. Small renal lesions too small to   characterize but statistically likely a cyst.  5.  Incidental and other findings as described above including right   renal stone, small inguinal hernias, degenerative disc disease,   atherosclerosis.         8/14/2018 Imaging     CT Abd/Pelvis:  1. Similar in appearance to the prior study, there are findings   suspicious for bowel perforation, with extraluminal gas seen adjacent   to the cecum and terminal ileum. This was subsequently evaluated at   the operating room.  2. Small volume of free fluid in the abdomen and pelvis. There is   also a rounded radiodensity free-floating within ascitic fluid in the   pelvis, which is of on certain etiology.. At the time of dictation,   this had been removed at laparoscopy.  3. There are two enhancing lesions arising from the urinary bladder   wall, suspicious for bladder neoplasms. These were subsequently   evaluated at cystoscopy.  4. Morphologic features of cirrhosis. 2.1 cm lesion in the right lobe   of the liver, not definitively characterized on this single phase   study, but suspicious for hepatocellular carcinoma. Recommend further   evaluation with dedicated liver protocol CT or MRI as well as   correlation with serum alpha-fetoprotein levels.  5. Additional findings as above.         8/14/2018 Biopsy     Diagnosis  1)  URINARY BLADDER, LEFT, TRANSURETHRAL RESECTION: NONINVASIVE HIGH GRADE PAPILLARY UROTHELIAL CARCINOMA; NO MUSCULARIS PROPRIA PRESENT.      2)  URINARY BLADER, RIGHT WALL, TRANSURETHRAL RESECTION: NONINVASIVE HIGH GRADE PAPILLARY UROTHELIAL CARCINOMA WITH EXTENSIVE CAUTERY ARTIFACT; NO MUSCULARIS PROPRIA PRESENT.            9/28/2018 Biopsy     TURBT - BHP:  Final Diagnosis   1.  Urinary bladder, lateral tumor, transurethral resection:  Noninvasive, high-grade papillary urothelial carcinoma  Acute and chronic inflammation  Muscularis propria is  present and negative     2.  Urinary bladder, posterior tumor, transurethral resection:  Noninvasive, high-grade papillary urothelial carcinoma  Chronic inflammation  Muscularis propria is not identified     3.  Urinary bladder, right lateral tumor, transurethral resection:   Severe cautery artifact  Intact urothelium is not visualized        AJCC cancer stage: pTa              1/21/2019 Biopsy     TURBT - BHP:  Final Diagnosis   1.  Bladder, posterior tumor #1, transurethral resection:  Noninvasive high-grade papillary urothelial carcinoma, pTa.  Detrusor muscle is not present for evaluation     2.  Bladder, area of right ureteral orifice, biopsy:  Urothelial carcinoma in situ, pTis.     3.  Bladder, posterior tumor #2, transurethral resection:  Noninvasive high-grade papillary urothelial carcinoma, pTa.  Detrusor muscle is not present for evaluation              4/18/2019 Imaging     CT Abd/Pelvis (Adal interpretation from outside facility)  1. Redemonstration of a treated lesion in segment VII of the right   hepatic lobe, without evidence of arterial enhancement or washout,   compatible with a LR-TR nonviable.  2. No new liver lesion is identified. Portal vein is patent, without   evidence of thrombus.  3. Cirrhosis, with evidence of portal hypertension         5/20/2019 Biopsy     TURBT - BHP:  Final Diagnosis   1.  Urinary bladder, left wall, biopsy:  A.  Invasive urothelial carcinoma with nested features invading the lamina propria.  B.  No muscularis propria smooth muscle present for evaluation.     2.  Urinary bladder, posterior wall, biopsy:  A.  Invasive urothelial carcinoma with nested features invading the lamina propria.  B.  No muscularis propria smooth muscle present for evaluation.     3.  Urinary bladder, right wall, biopsy: Invasive papillary urothelial carcinoma, high-grade.     Comment: The tumor invades the muscularis propria.  Focally, invasive carcinoma is seen involving thin smooth muscle  bundles.  It is difficult to distinguish whether these muscle bundles represent muscularis propria that has been partially destroyed by the invasive cancer or muscularis mucosa from the lamina propria.  Additional tissue sampling is recommended.     An extra departmental consultation was obtained from Dr. Jhonatan Parson at [facility].  His diagnostic impressions are reflected in the above diagnoses.  Please refer to the complete pathology report from Dr. Parson which is appended.              7/24/2019 Imaging     CT Chest/Abd/Pelvis:  1. Post procedural changes related to prior TACE in segment seven of   the right hepatic lobe. There is a nodular focus of arterial   enhancement at the posterior margin of the treated lesion, compatible   with LR-TR viable. Please note, on the pretreatment, outside MRI from   9/14/2018, this treated lesion had rim-like arterial phase   hyperenhancement, and would have been best characterized as LI-RADS -   M.  2. 1.2 cm soft tissue nodule along the right posterior bladder wall,   decreased in size since 8/14/2018, correlating with one of patient's   known bladder neoplasms. A second previously seen lesion along the   left posterior bladder wall is no longer clearly definable.  3. No findings to suggest metastatic disease in the abdomen or pelvis.  4. Small, nonspecific noncalcified nodule in the right upper lobe.   Recommend attention on follow-up.  5. Numerous additional findings as above.         7/24/2019 Biopsy     TUBRT - Adal:  Diagnosis    1. URINARY BLADDER, RIGHT LATERAL WALL, TRANSURETHRAL RESECTION:  - INVASIVE HIGH GRADE UROTHELIAL CARCINOMA.  - TUMOR INVADES MUSCULARIS PROPRIA.    2. URINARY BLADDER, BASE, TRANSURETHRAL RESECTION:  - UROTHELIAL CARCINOMA IN SITU.  - MUSCULARIS PROPRIA IS PRESENT.           10/1/2019 Imaging     CT Abd/Pelvis:  Impression:  1.  No signs of residual disease, LR TR nonviable.  2.  No new lesions.  3.  Signs of portal  "hypertension as before.                INTERVAL HISTORY  Patient ID: Elder Mina is a 69 y.o. year old male with recurrent bladder carcinoma. He has been seen at [place] by Dr. Dawn.  He underwent a transurethral resection on 7/24/2019 that found invasive high-grade urothelial carcinoma with the tumor invading the muscularis propria. He then underwent TACE on 8/8/2019 for hepatocellular carcinoma.  He has a history of alcohol cirrhosis. When he was seen by Dr Dawn he did not feel he was a cystectomy candidate and therefore discussed other options. Mr Mina wanted to be treated closer to home and is now being followed by Dr Resendiz.     The patient has been recommended to have a repeat TURBT prior to starting radiation and systemic therapy.  This is planned for November 15th.  He has seen radiation oncology and radiation therapy will be planned post repeat TURBT.  Dr. Resendiz has recommended Tecentriq plus radiation.  Mr. Mina is here today for education in regards to the Tecentriq therapy.    He is overall feeling well at this time.  He states he is fatigued and tired but otherwise no complaints.  He will be seeing Dr. France after this appointment for preop.      Past Medical History:   Past Medical History:   Diagnosis Date   • Anxiety    • Cataracts, bilateral    • Cirrhosis (CMS/HCC)    • COPD (chronic obstructive pulmonary disease) (CMS/HCC)    • Hematuria    • Urinary retention      Past Surgical History:   Past Surgical History:   Procedure Laterality Date   • CYSTOSCOPY     • ENDOSCOPY N/A 9/20/2018    Procedure: ESOPHAGOGASTRODUODENOSCOPY WITH ANESTHESIA;  Surgeon: Sanford Barahona DO;  Location: [facility] ENDOSCOPY;  Service: Gastroenterology   • EXPLORATORY LAPAROTOMY      \"cyst\" removed   • EYE SURGERY Right     cataract   • LIVER SURGERY  12/06/2018    went up through groin to cancerous liver nodule and cut off its blood supply and injected chemo into it   • TRANSURETHRAL RESECTION OF " BLADDER TUMOR N/A 9/28/2018    Procedure: CYSTOSCOPY TRANSURETHRAL RESECTION OF BLADDER TUMOR;  Surgeon: Paul Giang MD;  Location:  [facility] OR;  Service: Urology   • TRANSURETHRAL RESECTION OF BLADDER TUMOR N/A 1/21/2019    Procedure: CYSTOSCOPY TRANSURETHRAL RESECTION OF BLADDER TUMOR;  Surgeon: Paul Giang MD;  Location:  [facility] OR;  Service: Urology   • TRANSURETHRAL RESECTION OF BLADDER TUMOR N/A 5/20/2019    Procedure: CYSTOSCOPY TRANSURETHRAL RESECTION OF BLADDER TUMOR;  Surgeon: Paul Giang MD;  Location:  [facility] OR;  Service: Urology     Social History:   Social History     Socioeconomic History   • Marital status:      Spouse name: Not on file   • Number of children: Not on file   • Years of education: Not on file   • Highest education level: Not on file   Tobacco Use   • Smoking status: Current Every Day Smoker     Packs/day: 1.00     Years: 40.00     Pack years: 40.00   • Smokeless tobacco: Never Used   Substance and Sexual Activity   • Alcohol use: No     Frequency: Never   • Drug use: No   • Sexual activity: Defer     Family History:   Family History   Problem Relation Age of Onset   • Colon cancer Father    • Cirrhosis Mother    • Liver cancer Mother    • Liver disease Brother    • Liver disease Brother    • Emphysema Brother    • No Known Problems Maternal Grandmother    • No Known Problems Maternal Grandfather    • No Known Problems Paternal Grandmother    • No Known Problems Paternal Grandfather        Review of Systems   Constitutional: Positive for fatigue. Negative for activity change, appetite change, chills, diaphoresis, fever and unexpected weight change.   HENT: Negative for congestion, facial swelling, mouth sores, nosebleeds, sore throat, trouble swallowing and voice change.    Eyes: Negative for discharge and redness.   Respiratory: Positive for shortness of breath (chronic with exertion). Negative for cough, chest tightness and wheezing.    Cardiovascular:  "Negative for chest pain, palpitations and leg swelling.   Gastrointestinal: Negative for abdominal distention, abdominal pain, blood in stool, constipation, diarrhea, nausea and vomiting.   Endocrine: Negative.    Genitourinary: Negative for difficulty urinating, dysuria, frequency, hematuria and urgency.   Musculoskeletal: Negative for arthralgias, gait problem and myalgias.   Skin: Negative for color change and rash.   Neurological: Negative for dizziness, weakness, light-headedness, numbness and headaches.   Hematological: Negative for adenopathy. Does not bruise/bleed easily.   Psychiatric/Behavioral: Negative for confusion and sleep disturbance. The patient is not nervous/anxious.         Medications:    Current Outpatient Medications   Medication Sig Dispense Refill   • ALPRAZolam (XANAX) 1 MG tablet Take 1 mg by mouth 2 (Two) Times a Day As Needed for Anxiety.     • budesonide-formoterol (SYMBICORT) 160-4.5 MCG/ACT inhaler Inhale 2 puffs Daily.     • ipratropium-albuterol (COMBIVENT RESPIMAT)  MCG/ACT inhaler Inhale 1 puff 4 (Four) Times a Day As Needed for Wheezing.       No current facility-administered medications for this visit.        ALLERGIES:    Allergies   Allergen Reactions   • Penicillins Rash       Objective      Vitals:    11/06/19 0917   BP: 126/76   Pulse: 80   Resp: 16   Temp: 97.3 °F (36.3 °C)   TempSrc: Temporal   SpO2: 90%   Weight: 76.2 kg (167 lb 14.4 oz)   Height: 172.7 cm (68\")   PainSc: 0-No pain     /76   Pulse 80   Temp 97.3 °F (36.3 °C) (Temporal)   Resp 16   Ht 172.7 cm (68\")   Wt 76.2 kg (167 lb 14.4 oz)   SpO2 90%   BMI 25.53 kg/m²     Current Status 11/6/2019   ECOG score 1       PHYSICAL EXAM:  General Appearance:    Alert, cooperative, well nourished in no distress   Head:    Normocephalic, without obvious abnormality, atraumatic   Eyes:    PERRL, conjunctiva pink, sclera clear, EOM's intact   Ears:    Not examined   Nose:   Nares normal, septum midline, " mucosa normal, no drainage    Throat:   Lips, mucosa, and tongue normal;mucous membranes moist   Neck:   Supple, Trachea midline   Lungs:     Clear to auscultation bilaterally but diminished, respirations unlabored   Chest Wall:    No tenderness or deformity    Heart:    Regular rate and rhythm, no murmur, rub or gallop   Abdomen:     Soft, non-tender, bowel sounds active all four quadrants,     no masses, no organomegaly   Extremities:   Extremities without cyanosis or edema   Skin:   Skin color, texture, turgor normal, no rashes or lesions   Lymph nodes:   Cervical, supraclavicular, and axillary nodes normal   Neurologic:   Grossly nonfocal, gait coordinated and smooth, cognition is   preserved.     LABS    Lab Results - Last 18 Months   Lab Units 10/31/19  1602 05/16/19  1558 01/16/19  1445 09/26/18  1543 09/05/18  1157   HEMOGLOBIN g/dL 15.4 14.4 15.2 15.3 15.9   HEMATOCRIT % 45.6 42.4 44.4 44.2 45.8   MCV fL 99.3* 98.1* 103.3* 103.5* 103.6*   WBC 10*3/mm3 8.42 7.13 7.51 9.41 11.98*   RDW % 14.1 14.0 14.3 13.0 12.9   MPV fL 10.4 10.6 10.5 10.9 10.4   PLATELETS 10*3/mm3 96* 98* 134 113* 112*   NEUTROS ABS 10*3/mm3 4.48  --   --   --   --    LYMPHS ABS 10*3/mm3 2.44  --   --   --   --    MONOS ABS 10*3/mm3 1.23*  --   --   --   --    EOS ABS 10*3/mm3 0.20  --   --   --   --    BASOS ABS 10*3/mm3 0.04  --   --   --   --        Lab Results - Last 18 Months   Lab Units 10/31/19  1602 05/16/19  1558 01/16/19  1448 09/26/18  1543 09/05/18  1157   GLUCOSE mg/dL 67 85 104* 102* 140*   SODIUM mmol/L 143 140 138 140 141   POTASSIUM mmol/L 4.2 4.2 4.1 4.1 4.2   CO2 mmol/L 33.0* 32.0* 33.0* 30.0 31.0   CHLORIDE mmol/L 101 101 96* 99 99   ANION GAP mmol/L 9.0 7.0 9.0 11.0 11.0   CREATININE mg/dL 0.79 0.65 0.58 0.66 0.63   BUN mg/dL 21 23* 15 11 5   BUN / CREAT RATIO  26.6* 35.4* 25.9* 16.7 7.9   CALCIUM mg/dL 9.6 9.7 9.5 9.8 9.8   EGFR IF NONAFRICN AM mL/min/1.73 97 122 139 120 126   ALK PHOS U/L 121*  --  232* 200* 257*    TOTAL PROTEIN g/dL 8.2  --  8.4 7.9 8.5   ALT (SGPT) U/L 28  --  44 56* 39   AST (SGOT) U/L 29  --  73* 80* 73*   BILIRUBIN mg/dL 0.4  --  0.9 0.9 1.0   ALBUMIN g/dL 4.30  --  4.20 4.20 4.50   GLOBULIN gm/dL 3.9  --  4.2 3.7 4.0           Assessment/Plan   1.  High-grade urothelial carcinoma of the bladder status post a TURBT back in July but then incidentally was diagnosed with a hepatocellular carcinoma that required TACE.  He has now been deemed a nonsurgical candidate for cystectomy.  Therefore, he has been recommended to undergo radiation plus systemic therapy.  He will have a repeat TURBT November 12 for maximum tumor resection and then radiation and systemic therapy will follow.   has recommended Tecentriq and patient is here today for education regarding the dosing and regimen of to centric as well as the risks benefits and potential side effects.    Handouts on to centric from [employer] given to the patient and his wife.  The information on these handouts was thoroughly discussed and listed below.      How Atezolizumab (TECENTRIQ) Is Given:  Atezolizumab is given as an intravenous injection through a vein (IV) over 60 minutes for the first infusion, and if no infusion reaction, over 30 minutes for each infusion thereafter.   Treatment cycles are every 3 weeks (21 days)   You may receive medications before the infusion to reduce allergic reactions.  Side Effects:  Important things to remember about the side effects of atezolizumab:  Most people do not experience all of the side effects listed.   Observed side effects below may NOT be related to atezolizumab.   Side effects are often predictable in terms of their onset and duration.   Immune-related side effects can occur weeks or months after discontinuation of treatment.   There are many options to help manage and prevent worsening of side effects.   There is no relationship between the presence or severity of side effects and the  effectiveness of the medication.  The following side effects have been observed commonly for patients taking atezolizumab but may NOT be related to atezolizumab:  Fatigue  These side effects have been observed less commonly of patients receiving atezolizumab but may NOT be related to atezolizumab:  Decreased appetite   Nausea   Urinary tract or other infection   Constipation   Fever   Diarrhea   Colitis (bowel inflammation)   Swelling   Abdominal pain   Vomiting   Shortness of breath and/or cough and/or wheezing   Neck pain/back pain   Rash and/or itching   Hematuria (blood in the urine)   Arthralgia (joint pain)   Hyponatremia (low sodium level)   Thyroid changes   Autoimmune reactions* and infusion reactions  *A rare but possible side effect of this class of drug is the trigger of an auto-immune reaction. This can happen at any time when taking this drug, and/or after stopping the drug. The immune system may go after normal cells in the body, e.g. lungs, skin, intestines, endocrine system, liver, pancreas, eyes, etc. Symptoms and signs of this reaction will be closely monitored throughout treatment (e.g. cough, shortness of breath, wheezing, rash, diarrhea/blood in stool, fatigue/weakness, visual changes, etc.). Lab work will also be used to check for elevated liver enzymes, kidney and thyroid function, electrolytes, glucose, and blood counts.  Contact your health care provider right away if you have any new or worsening symptoms.  Not all potential side effects are listed above. Some of those that are more rare are not listed here. However, you should always inform your health care provider if you experience any unusual symptoms or changes.    When To Contact Your Doctor Or Health Care Provider:  Contact your health care provider immediately, day or night, if you should experience any of the following symptoms (may NOT be related to atezolizumab):  Fever of 100.4° (e.g. 38° C) or higher, chills, flank pain,  cloudy or foul smelling urine, or other acute changes and/or signs of infection.   Signs of reaction to the drug (e.g. wheezing, chest tightness/pain, shortness of breath, cough, rash, itching, swelling of the face, lips, tongue, or throat)   Persistent or unusual headache, extreme generalized or focal weakness, dizziness/lightheadedness or fainting, stiff neck, new severe numbness, or vision changes.   Blood in stool, very dark stool, light colored stool, yellow skin/eyes/tongue, dark urine, unusual swelling of one of your legs/arms, bleeding, severe pain, or other acute changes.  The following symptoms require urgent medical attention, but might not usually be an emergency. Contact your doctor or health care provider within 24 hours of noticing any of the following (may NOT be related to atezolizumab):  Unable to eat or drink for 24 hours or have signs of dehydration, e.g. tiredness, thirst, dry mouth/skin, dark and decrease amount of urine, or dizziness   Nausea (interferes with ability to eat and unrelieved with prescribed medication), decrease appetite   Vomiting (vomiting more than 4-5 times in a 24 hour period)   Diarrhea, abdominal or back pain, especially right side   Extreme fatigue that impairs your normal activities of daily living   Significant change in weight   Change in mood or personality, feeling confused   Constipation   Deeper voice, feeling cold or hair loss   Numbness or tingling in hands or feet, very bad muscle weakness   Bleed or bruise more easily than normal   Signs of infection: cough, frequent or painful urination, flu-like or other symptoms/signs  Always inform your health care provider if you experience any unusual symptoms or changes.    2.  Hepatocellular Carcinoma s/p TACE on 8/8/19. LFTs are within normal range.  ALKP slightly elevated at 121 as of 10/31/19.  He is planned to have repeat scans and followup in [place] for this in January.     3.  COPD is stable at present.  O2  sat at 90%, no distress or exacerbation.      PLAN  1.  Patient will undergo a repeat TURBT on November 15.  2.  Patient will return to see  on November 22 as planned.  3.  Will put in orders for cbc, cmp and baseline thyroid panel in preparation of starting Tecentriq  4.  Patient is wife both were advised to call with any problems or concerns.  5.  Encourage patient to continue following with his primary care provider for medical management as well as other specialists.        Orders Placed This Encounter   Procedures   • Comprehensive Metabolic Panel     Standing Status:   Future   • TSH     Standing Status:   Future     Standing Expiration Date:   11/6/2020   • T4, Free     Standing Status:   Future     Standing Expiration Date:   11/6/2020   • T3, Free     Standing Status:   Future     Standing Expiration Date:   11/6/2020   • CBC & Differential     Standing Status:   Future     Standing Expiration Date:   11/5/2020     Order Specific Question:   Manual Differential     Answer:   No         I have spent a total of  ___30_  minutes in face-to-face encounter with the patient, out of which more than 50% was counseling the patient and family regarding coordination of care.  Counseling included but not limited to time spent reviewing labs, treatment plan as well as answering questions.     All activities occurring within this visit are in accordance with the plan of care as set forth by Dr. Logan Resendiz.    Devi Loza, APRN    11/6/2019    11:07 AM

## 2019-11-08 ENCOUNTER — APPOINTMENT (OUTPATIENT)
Dept: PREADMISSION TESTING | Facility: HOSPITAL | Age: 69
End: 2019-11-08

## 2019-11-12 DIAGNOSIS — C67.9 MALIGNANT NEOPLASM OF URINARY BLADDER, UNSPECIFIED SITE (HCC): ICD-10-CM

## 2019-11-12 DIAGNOSIS — Z79.899 ENCOUNTER FOR LONG-TERM (CURRENT) USE OF HIGH-RISK MEDICATION: ICD-10-CM

## 2019-11-14 LAB
CYTO UR: NORMAL
LAB AP CASE REPORT: NORMAL
PATH REPORT.ADDENDUM SPEC: NORMAL
PATH REPORT.FINAL DX SPEC: NORMAL
PATH REPORT.GROSS SPEC: NORMAL

## 2019-11-15 ENCOUNTER — ANESTHESIA (OUTPATIENT)
Dept: PERIOP | Facility: HOSPITAL | Age: 69
End: 2019-11-15

## 2019-11-15 ENCOUNTER — ANESTHESIA EVENT (OUTPATIENT)
Dept: PERIOP | Facility: HOSPITAL | Age: 69
End: 2019-11-15

## 2019-11-15 ENCOUNTER — HOSPITAL ENCOUNTER (OUTPATIENT)
Facility: HOSPITAL | Age: 69
Setting detail: HOSPITAL OUTPATIENT SURGERY
Discharge: HOME | End: 2019-11-15
Attending: UROLOGY | Admitting: UROLOGY

## 2019-11-15 VITALS
TEMPERATURE: 97.5 F | RESPIRATION RATE: 16 BRPM | OXYGEN SATURATION: 94 % | HEART RATE: 68 BPM | SYSTOLIC BLOOD PRESSURE: 111 MMHG | DIASTOLIC BLOOD PRESSURE: 73 MMHG

## 2019-11-15 DIAGNOSIS — C67.2 CANCER OF LATERAL WALL OF URINARY BLADDER (HCC): ICD-10-CM

## 2019-11-15 PROCEDURE — 25010000002 FENTANYL CITRATE (PF) 100 MCG/2ML SOLUTION: Performed by: NURSE ANESTHETIST, CERTIFIED REGISTERED

## 2019-11-15 PROCEDURE — 52234 CYSTOSCOPY AND TREATMENT: CPT | Performed by: UROLOGY

## 2019-11-15 PROCEDURE — 88307 TISSUE EXAM BY PATHOLOGIST: CPT | Performed by: UROLOGY

## 2019-11-15 PROCEDURE — 25010000002 PROPOFOL 10 MG/ML EMULSION: Performed by: NURSE ANESTHETIST, CERTIFIED REGISTERED

## 2019-11-15 PROCEDURE — 25010000002 LEVOFLOXACIN PER 250 MG: Performed by: UROLOGY

## 2019-11-15 PROCEDURE — 88313 SPECIAL STAINS GROUP 2: CPT | Performed by: UROLOGY

## 2019-11-15 PROCEDURE — 88342 IMHCHEM/IMCYTCHM 1ST ANTB: CPT | Performed by: UROLOGY

## 2019-11-15 PROCEDURE — 25010000002 LEVOFLOXACIN PER 250 MG: Performed by: NURSE ANESTHETIST, CERTIFIED REGISTERED

## 2019-11-15 RX ORDER — LABETALOL HYDROCHLORIDE 5 MG/ML
5 INJECTION, SOLUTION INTRAVENOUS
Status: DISCONTINUED | OUTPATIENT
Start: 2019-11-15 | End: 2019-11-15 | Stop reason: HOSPADM

## 2019-11-15 RX ORDER — METOCLOPRAMIDE HYDROCHLORIDE 5 MG/ML
5 INJECTION INTRAMUSCULAR; INTRAVENOUS
Status: DISCONTINUED | OUTPATIENT
Start: 2019-11-15 | End: 2019-11-15 | Stop reason: HOSPADM

## 2019-11-15 RX ORDER — FENTANYL CITRATE 50 UG/ML
25 INJECTION, SOLUTION INTRAMUSCULAR; INTRAVENOUS AS NEEDED
Status: DISCONTINUED | OUTPATIENT
Start: 2019-11-15 | End: 2019-11-15 | Stop reason: HOSPADM

## 2019-11-15 RX ORDER — SODIUM CHLORIDE 0.9 % (FLUSH) 0.9 %
3-10 SYRINGE (ML) INJECTION AS NEEDED
Status: DISCONTINUED | OUTPATIENT
Start: 2019-11-15 | End: 2019-11-15 | Stop reason: HOSPADM

## 2019-11-15 RX ORDER — MAGNESIUM HYDROXIDE 1200 MG/15ML
LIQUID ORAL AS NEEDED
Status: DISCONTINUED | OUTPATIENT
Start: 2019-11-15 | End: 2019-11-15 | Stop reason: HOSPADM

## 2019-11-15 RX ORDER — IPRATROPIUM BROMIDE AND ALBUTEROL SULFATE 2.5; .5 MG/3ML; MG/3ML
3 SOLUTION RESPIRATORY (INHALATION) ONCE
Status: COMPLETED | OUTPATIENT
Start: 2019-11-15 | End: 2019-11-15

## 2019-11-15 RX ORDER — LEVOFLOXACIN 5 MG/ML
INJECTION, SOLUTION INTRAVENOUS AS NEEDED
Status: DISCONTINUED | OUTPATIENT
Start: 2019-11-15 | End: 2019-11-15 | Stop reason: SURG

## 2019-11-15 RX ORDER — LEVOFLOXACIN 5 MG/ML
500 INJECTION, SOLUTION INTRAVENOUS ONCE
Status: COMPLETED | OUTPATIENT
Start: 2019-11-15 | End: 2019-11-15

## 2019-11-15 RX ORDER — SODIUM CHLORIDE, SODIUM LACTATE, POTASSIUM CHLORIDE, CALCIUM CHLORIDE 600; 310; 30; 20 MG/100ML; MG/100ML; MG/100ML; MG/100ML
1000 INJECTION, SOLUTION INTRAVENOUS CONTINUOUS
Status: DISCONTINUED | OUTPATIENT
Start: 2019-11-15 | End: 2019-11-15 | Stop reason: HOSPADM

## 2019-11-15 RX ORDER — SODIUM CHLORIDE 0.9 % (FLUSH) 0.9 %
3 SYRINGE (ML) INJECTION EVERY 12 HOURS SCHEDULED
Status: DISCONTINUED | OUTPATIENT
Start: 2019-11-15 | End: 2019-11-15 | Stop reason: HOSPADM

## 2019-11-15 RX ORDER — SODIUM CHLORIDE 0.9 % (FLUSH) 0.9 %
3 SYRINGE (ML) INJECTION AS NEEDED
Status: DISCONTINUED | OUTPATIENT
Start: 2019-11-15 | End: 2019-11-15 | Stop reason: HOSPADM

## 2019-11-15 RX ORDER — TRAMADOL HYDROCHLORIDE 50 MG/1
50 TABLET ORAL EVERY 6 HOURS PRN
Qty: 8 TABLET | Refills: 0 | Status: SHIPPED | OUTPATIENT
Start: 2019-11-15 | End: 2020-01-03

## 2019-11-15 RX ORDER — OXYBUTYNIN CHLORIDE 5 MG/1
5 TABLET ORAL EVERY 8 HOURS PRN
Qty: 30 TABLET | Refills: 1 | Status: SHIPPED | OUTPATIENT
Start: 2019-11-15 | End: 2020-01-03

## 2019-11-15 RX ORDER — PHENAZOPYRIDINE HYDROCHLORIDE 100 MG/1
100 TABLET, FILM COATED ORAL 3 TIMES DAILY PRN
Qty: 20 TABLET | Refills: 0 | Status: SHIPPED | OUTPATIENT
Start: 2019-11-15 | End: 2020-01-03

## 2019-11-15 RX ORDER — ALBUTEROL SULFATE 90 UG/1
AEROSOL, METERED RESPIRATORY (INHALATION) AS NEEDED
Status: DISCONTINUED | OUTPATIENT
Start: 2019-11-15 | End: 2019-11-15 | Stop reason: SURG

## 2019-11-15 RX ORDER — IPRATROPIUM BROMIDE AND ALBUTEROL SULFATE 2.5; .5 MG/3ML; MG/3ML
3 SOLUTION RESPIRATORY (INHALATION) ONCE AS NEEDED
Status: DISCONTINUED | OUTPATIENT
Start: 2019-11-15 | End: 2019-11-15 | Stop reason: HOSPADM

## 2019-11-15 RX ORDER — ACETAMINOPHEN 500 MG
1000 TABLET ORAL ONCE
Status: COMPLETED | OUTPATIENT
Start: 2019-11-15 | End: 2019-11-15

## 2019-11-15 RX ORDER — SODIUM CHLORIDE, SODIUM LACTATE, POTASSIUM CHLORIDE, CALCIUM CHLORIDE 600; 310; 30; 20 MG/100ML; MG/100ML; MG/100ML; MG/100ML
100 INJECTION, SOLUTION INTRAVENOUS CONTINUOUS
Status: DISCONTINUED | OUTPATIENT
Start: 2019-11-15 | End: 2019-11-15 | Stop reason: HOSPADM

## 2019-11-15 RX ORDER — PROPOFOL 10 MG/ML
VIAL (ML) INTRAVENOUS AS NEEDED
Status: DISCONTINUED | OUTPATIENT
Start: 2019-11-15 | End: 2019-11-15 | Stop reason: SURG

## 2019-11-15 RX ORDER — OXYCODONE AND ACETAMINOPHEN 10; 325 MG/1; MG/1
1 TABLET ORAL ONCE AS NEEDED
Status: DISCONTINUED | OUTPATIENT
Start: 2019-11-15 | End: 2019-11-15 | Stop reason: HOSPADM

## 2019-11-15 RX ORDER — FENTANYL CITRATE 50 UG/ML
INJECTION, SOLUTION INTRAMUSCULAR; INTRAVENOUS AS NEEDED
Status: DISCONTINUED | OUTPATIENT
Start: 2019-11-15 | End: 2019-11-15 | Stop reason: SURG

## 2019-11-15 RX ORDER — NALOXONE HCL 0.4 MG/ML
0.4 VIAL (ML) INJECTION AS NEEDED
Status: DISCONTINUED | OUTPATIENT
Start: 2019-11-15 | End: 2019-11-15 | Stop reason: HOSPADM

## 2019-11-15 RX ORDER — IBUPROFEN 600 MG/1
600 TABLET ORAL ONCE AS NEEDED
Status: DISCONTINUED | OUTPATIENT
Start: 2019-11-15 | End: 2019-11-15 | Stop reason: HOSPADM

## 2019-11-15 RX ORDER — ONDANSETRON 2 MG/ML
4 INJECTION INTRAMUSCULAR; INTRAVENOUS ONCE AS NEEDED
Status: DISCONTINUED | OUTPATIENT
Start: 2019-11-15 | End: 2019-11-15 | Stop reason: HOSPADM

## 2019-11-15 RX ORDER — OXYCODONE AND ACETAMINOPHEN 7.5; 325 MG/1; MG/1
2 TABLET ORAL EVERY 4 HOURS PRN
Status: DISCONTINUED | OUTPATIENT
Start: 2019-11-15 | End: 2019-11-15 | Stop reason: HOSPADM

## 2019-11-15 RX ADMIN — PROPOFOL 120 MG: 10 INJECTION, EMULSION INTRAVENOUS at 08:36

## 2019-11-15 RX ADMIN — ACETAMINOPHEN 1000 MG: 500 TABLET, FILM COATED ORAL at 07:36

## 2019-11-15 RX ADMIN — LEVOFLOXACIN 500 MG: 500 INJECTION, SOLUTION INTRAVENOUS at 08:40

## 2019-11-15 RX ADMIN — LIDOCAINE HYDROCHLORIDE 40 MG: 20 INJECTION, SOLUTION INTRAVENOUS at 08:36

## 2019-11-15 RX ADMIN — SODIUM CHLORIDE, POTASSIUM CHLORIDE, SODIUM LACTATE AND CALCIUM CHLORIDE 1000 ML: 600; 310; 30; 20 INJECTION, SOLUTION INTRAVENOUS at 07:04

## 2019-11-15 RX ADMIN — FENTANYL CITRATE 100 MCG: 50 INJECTION, SOLUTION INTRAMUSCULAR; INTRAVENOUS at 08:47

## 2019-11-15 RX ADMIN — LEVOFLOXACIN 500 MG: 500 INJECTION, SOLUTION INTRAVENOUS at 07:31

## 2019-11-15 RX ADMIN — IPRATROPIUM BROMIDE AND ALBUTEROL SULFATE 3 ML: 2.5; .5 SOLUTION RESPIRATORY (INHALATION) at 07:36

## 2019-11-15 RX ADMIN — PROPOFOL 30 MG: 10 INJECTION, EMULSION INTRAVENOUS at 08:48

## 2019-11-15 RX ADMIN — ALBUTEROL SULFATE 3 PUFF: 90 AEROSOL, METERED RESPIRATORY (INHALATION) at 08:58

## 2019-11-15 NOTE — ANESTHESIA PROCEDURE NOTES
Airway  Urgency: elective    Date/Time: 11/15/2019 8:36 AM  Airway not difficult    General Information and Staff    Patient location during procedure: OR  CRNA: Miquel Mahoney CRNA    Indications and Patient Condition  Indications for airway management: airway protection    Preoxygenated: yes  Mask difficulty assessment: 1 - vent by mask    Final Airway Details  Final airway type: supraglottic airway      Successful airway: classic and I-gel  Size 4    Number of attempts at approach: 1  Assessment: lips, teeth, and gum same as pre-op and atraumatic intubation

## 2019-11-15 NOTE — BRIEF OP NOTE
CYSTOSCOPY TRANSURETHRAL RESECTION OF BLADDER TUMOR  Progress Note    Elder Mina  11/15/2019    Pre-op Diagnosis:   Cancer of lateral wall of urinary bladder (CMS/HCC) [C67.2]       Post-Op Diagnosis Codes:     * Cancer of lateral wall of urinary bladder (CMS/HCC) [C67.2]    Procedure/CPT® Codes:      Procedure(s):  CYSTOSCOPY TRANSURETHRAL RESECTION OF BLADDER TUMOR    Surgeon(s):  Yordy France MD    Anesthesia: General    Staff:   Circulator: Priscilla Bailey RN; Jey Rodriguez RN  Scrub Person: Nelson Mari; Teresa Taylor    Estimated Blood Loss: none    Urine Voided: * No values recorded between 11/15/2019  8:29 AM and 11/15/2019  9:06 AM *    Specimens:                Specimens     ID Source Type Tests Collected By Collected At Frozen?      A Urinary Bladder Tissue · TISSUE PATHOLOGY EXAM   Yordy France MD 11/15/19 0853      Description: posterior wall bladder tumor    B Urinary Bladder Tissue · TISSUE PATHOLOGY EXAM   Yordy France MD 11/15/19 0853      Description: right lateral wall bladder tumor    C Urinary Bladder Tissue · TISSUE PATHOLOGY EXAM   Yordy France MD 11/15/19 0855      Description: dome bladder tumor                Drains:  None    Findings: Small papillary tumor posterior wall.  Erythematous lesion at dome.  Postoperative changes from prior TURBT right lateral wall re-resected.    Complications: None      Yordy France MD     Date: 11/15/2019  Time: 9:16 AM

## 2019-11-15 NOTE — DISCHARGE INSTRUCTIONS

## 2019-11-15 NOTE — OP NOTE
Operative Summary    Elder Mina  Date of Procedure: 11/15/2019    Pre-op Diagnosis:   Cancer of lateral wall of urinary bladder (CMS/HCC) [C67.2]    Post-op Diagnosis:     Post-Op Diagnosis Codes:     * Cancer of lateral wall of urinary bladder (CMS/HCC) [C67.2]    Procedure/CPT® Codes:      Procedure(s):  CYSTOSCOPY TRANSURETHRAL RESECTION OF BLADDER TUMOR    Surgeon(s):  Yordy France MD    Anesthesia: General    Staff:   Circulator: Priscilla Bailey RN; Jey Rodriguez RN  Scrub Person: Nelson Mari; Teresa Taylor    Indications for procedure:  69-year-old male with history of muscle invasive bladder cancer seen at [facility] and was deemed unfit for cystectomy.  He is preparing to undergo bladder conserving therapy with chemo and radiation.  He is presenting for repeat TURBT.    Findings:   Erythematous postoperative changes right lateral wall from prior TURBT re-resected.  Small tumor posterior wall.  Erythematous lesion at dome biopsied separately.        Procedure details:  The patient is identified in the preoperative holding area.  The informed consent process had been completed In the office documented by my last progress note that included a discussion of the ri since Sks of this procedure, alternative management options, and the potential benefits of this procedure.  The patient voiced a good recollection of that discussion.  The patient voiced no additional questions.     The patient is taken to the cystoscopic suite and given effective general anesthesia. The patient is then placed in the dorsal lithotomy position with care being placed to appropriately pad the patient to avoid excessive compression with the Martin stirrups, especially laterally to the leg on the peroneal nerve.  The patient is then prepped and draped in the usual sterile fashion.  At this point appropriate timeout protocol was observed.    A 22 Romanian cystoscope sheath with 30° lens is inserted into the bladder.   I also used a 70° lens to carry out a panendoscopic inspection of the urinary bladder.  Findings are described above.     A resectoscope sheath with visual obturator was placed.  The resecting loop was used to re-resect the area of prior tumor along the right lateral wall taking care to avoid injury to the ureteral orifices.  There was a small papillary tumor on the posterior wall which was resected.  There is also an erythematous lesion along the dome that was biopsied.  There is no evidence of a perforation.  Hemostasis was obtained with electrocautery.  The specimens were removed and sent separately.  Bladder was drained and the procedure terminated.  He was awakened taken back to the recovery room in stable condition  .     Estimated Blood Loss: Minimal      Specimens:                Specimens     ID Source Type Tests Collected By Collected At Frozen?      A Urinary Bladder Tissue · TISSUE PATHOLOGY EXAM   Yordy France MD 11/15/19 0853      Description: posterior wall bladder tumor    B Urinary Bladder Tissue · TISSUE PATHOLOGY EXAM   Yordy France MD 11/15/19 0853      Description: right lateral wall bladder tumor    C Urinary Bladder Tissue · TISSUE PATHOLOGY EXAM   Yordy France MD 11/15/19 0855      Description: dome bladder tumor            Drains:  None    Complications: none    Plan: He is scheduled to meet with radiation oncology and medical oncology next week.  He will proceed with chemoradiation.  Follow-up with me 3 months after completing bladder conserving therapy for surveillance cystoscopy or sooner as needed    Yordy France MD     Date: 11/15/2019  Time: 9:19 AM

## 2019-11-15 NOTE — ANESTHESIA PREPROCEDURE EVALUATION
Anesthesia Evaluation     Patient summary reviewed and Nursing notes reviewed   no history of anesthetic complications:  NPO Solid Status: > 8 hours  NPO Liquid Status: > 8 hours           Airway   Mallampati: I  TM distance: >3 FB  Neck ROM: full  No difficulty expected  Dental    (+) edentulous    Pulmonary     breath sounds clear to auscultation  (+) a smoker Current Smoked day of surgery, COPD (prescribed home o2, reports he is not using. Uses inhalers regularly),   Cardiovascular   Exercise tolerance: poor (<4 METS)    ECG reviewed  Rhythm: regular  Rate: normal        Neuro/Psych  (+) psychiatric history Anxiety,     (-) seizures, TIA, CVA  GI/Hepatic/Renal/Endo    (+)   liver disease (cirrhosis, hepatocellular carcinoma s/p IR embolization. Recent CT shows cirrhosis, varices),   (-) no renal disease, diabetes    Musculoskeletal     Abdominal    Substance History   (+) alcohol use (quit drinking several months ago, prior to that several beers daily),      OB/GYN          Other      history of cancer (bladder cancer) active                      Anesthesia Plan    ASA 3     general   (Pt with new PVCs on ekg. Poor functional status but no major risk factors for cad based on RCRI. )  intravenous induction     Anesthetic plan, all risks, benefits, and alternatives have been provided, discussed and informed consent has been obtained with: patient.

## 2019-11-15 NOTE — ANESTHESIA POSTPROCEDURE EVALUATION
Patient: Elder Mina    Procedure Summary     Date:  11/15/19 Room / Location:   [facility]  /  [facility]    Anesthesia Start:  0832 Anesthesia Stop:  0909    Procedure:  CYSTOSCOPY TRANSURETHRAL RESECTION OF BLADDER TUMOR (N/A Bladder) Diagnosis:       Cancer of lateral wall of urinary bladder (CMS/HCC)      (Cancer of lateral wall of urinary bladder (CMS/HCC) [C67.2])    Surgeon:  Yordy France MD Provider:  Miquel Mahoney CRNA    Anesthesia Type:  general ASA Status:  3          Anesthesia Type: general  Last vitals  BP   111/56 (11/15/19 0939)   Temp   97.5 °F (36.4 °C) (11/15/19 0930)   Pulse   66 (11/15/19 0939)   Resp   16 (11/15/19 0939)     SpO2   95 % (11/15/19 0939)     Post Anesthesia Care and Evaluation    Patient location during evaluation: PACU  Patient participation: complete - patient participated  Level of consciousness: awake and alert  Pain management: adequate  Airway patency: patent  Anesthetic complications: No anesthetic complications  PONV Status: none  Cardiovascular status: acceptable and hemodynamically stable  Respiratory status: acceptable  Hydration status: acceptable    Comments: Blood pressure 111/56, pulse 66, temperature 97.5 °F (36.4 °C), temperature source Temporal, resp. rate 16, SpO2 95 %.    Patient discharged from PACU based upon Arielle score. Please see RN notes for further details

## 2019-11-19 LAB
LAB AP CASE REPORT: NORMAL
LAB AP DIAGNOSIS COMMENT: NORMAL
PATH REPORT.FINAL DX SPEC: NORMAL
PATH REPORT.GROSS SPEC: NORMAL

## 2019-11-20 ENCOUNTER — APPOINTMENT (OUTPATIENT)
Dept: LAB | Facility: HOSPITAL | Age: 69
End: 2019-11-20

## 2019-11-22 ENCOUNTER — LAB (OUTPATIENT)
Dept: LAB | Facility: HOSPITAL | Age: 69
End: 2019-11-22

## 2019-11-22 ENCOUNTER — OFFICE VISIT (OUTPATIENT)
Dept: ONCOLOGY | Facility: CLINIC | Age: 69
End: 2019-11-22

## 2019-11-22 ENCOUNTER — HOSPITAL ENCOUNTER (OUTPATIENT)
Dept: RADIATION ONCOLOGY | Facility: HOSPITAL | Age: 69
Discharge: HOME | End: 2019-11-22

## 2019-11-22 ENCOUNTER — OFFICE VISIT (OUTPATIENT)
Dept: RADIATION ONCOLOGY | Facility: HOSPITAL | Age: 69
End: 2019-11-22

## 2019-11-22 ENCOUNTER — INFUSION (OUTPATIENT)
Dept: ONCOLOGY | Facility: HOSPITAL | Age: 69
End: 2019-11-22

## 2019-11-22 VITALS
WEIGHT: 166.4 LBS | SYSTOLIC BLOOD PRESSURE: 124 MMHG | BODY MASS INDEX: 25.22 KG/M2 | HEIGHT: 68 IN | TEMPERATURE: 98.5 F | RESPIRATION RATE: 16 BRPM | OXYGEN SATURATION: 90 % | HEART RATE: 88 BPM | DIASTOLIC BLOOD PRESSURE: 60 MMHG

## 2019-11-22 VITALS
DIASTOLIC BLOOD PRESSURE: 48 MMHG | RESPIRATION RATE: 18 BRPM | HEART RATE: 75 BPM | BODY MASS INDEX: 23.77 KG/M2 | SYSTOLIC BLOOD PRESSURE: 98 MMHG | HEIGHT: 70 IN | TEMPERATURE: 98 F | WEIGHT: 166 LBS | OXYGEN SATURATION: 90 %

## 2019-11-22 VITALS
SYSTOLIC BLOOD PRESSURE: 128 MMHG | BODY MASS INDEX: 23.77 KG/M2 | DIASTOLIC BLOOD PRESSURE: 74 MMHG | WEIGHT: 166 LBS | HEIGHT: 70 IN

## 2019-11-22 DIAGNOSIS — C67.8 MALIGNANT NEOPLASM OF OVERLAPPING SITES OF BLADDER (HCC): Primary | ICD-10-CM

## 2019-11-22 DIAGNOSIS — C67.9 MALIGNANT NEOPLASM OF URINARY BLADDER, UNSPECIFIED SITE (HCC): Primary | ICD-10-CM

## 2019-11-22 DIAGNOSIS — F17.200 CURRENT EVERY DAY SMOKER: ICD-10-CM

## 2019-11-22 DIAGNOSIS — Z71.9 ENCOUNTER FOR CONSULTATION: ICD-10-CM

## 2019-11-22 DIAGNOSIS — Z79.899 ENCOUNTER FOR LONG-TERM (CURRENT) USE OF HIGH-RISK MEDICATION: ICD-10-CM

## 2019-11-22 DIAGNOSIS — Z79.899 ON ANTINEOPLASTIC CHEMOTHERAPY: ICD-10-CM

## 2019-11-22 DIAGNOSIS — C22.0 HEPATOCELLULAR CARCINOMA (HCC): ICD-10-CM

## 2019-11-22 LAB
ALBUMIN SERPL-MCNC: 4.1 G/DL (ref 3.5–5.2)
ALBUMIN/GLOB SERPL: 1.2 G/DL
ALP SERPL-CCNC: 116 U/L (ref 39–117)
ALT SERPL W P-5'-P-CCNC: 26 U/L (ref 1–41)
ANION GAP SERPL CALCULATED.3IONS-SCNC: 7 MMOL/L (ref 5–15)
AST SERPL-CCNC: 26 U/L (ref 1–40)
BASOPHILS # BLD AUTO: 0.06 10*3/MM3 (ref 0–0.2)
BASOPHILS NFR BLD AUTO: 0.7 % (ref 0–1.5)
BILIRUB SERPL-MCNC: 0.6 MG/DL (ref 0.2–1.2)
BUN BLD-MCNC: 20 MG/DL (ref 8–23)
BUN/CREAT SERPL: 26.7 (ref 7–25)
CALCIUM SPEC-SCNC: 9.9 MG/DL (ref 8.6–10.5)
CHLORIDE SERPL-SCNC: 101 MMOL/L (ref 98–107)
CO2 SERPL-SCNC: 34 MMOL/L (ref 22–29)
CREAT BLD-MCNC: 0.75 MG/DL (ref 0.76–1.27)
DEPRECATED RDW RBC AUTO: 50.7 FL (ref 37–54)
EOSINOPHIL # BLD AUTO: 0.4 10*3/MM3 (ref 0–0.4)
EOSINOPHIL NFR BLD AUTO: 4.7 % (ref 0.3–6.2)
ERYTHROCYTE [DISTWIDTH] IN BLOOD BY AUTOMATED COUNT: 13.8 % (ref 12.3–15.4)
GFR SERPL CREATININE-BSD FRML MDRD: 103 ML/MIN/1.73
GLOBULIN UR ELPH-MCNC: 3.5 GM/DL
GLUCOSE BLD-MCNC: 90 MG/DL (ref 65–99)
HCT VFR BLD AUTO: 46.2 % (ref 37.5–51)
HGB BLD-MCNC: 15.5 G/DL (ref 13–17.7)
HOLD SPECIMEN: NORMAL
IMM GRANULOCYTES # BLD AUTO: 0.01 10*3/MM3 (ref 0–0.05)
IMM GRANULOCYTES NFR BLD AUTO: 0.1 % (ref 0–0.5)
LYMPHOCYTES # BLD AUTO: 2.07 10*3/MM3 (ref 0.7–3.1)
LYMPHOCYTES NFR BLD AUTO: 24.4 % (ref 19.6–45.3)
MCH RBC QN AUTO: 33.3 PG (ref 26.6–33)
MCHC RBC AUTO-ENTMCNC: 33.5 G/DL (ref 31.5–35.7)
MCV RBC AUTO: 99.4 FL (ref 79–97)
MONOCYTES # BLD AUTO: 1.12 10*3/MM3 (ref 0.1–0.9)
MONOCYTES NFR BLD AUTO: 13.2 % (ref 5–12)
NEUTROPHILS # BLD AUTO: 4.84 10*3/MM3 (ref 1.7–7)
NEUTROPHILS NFR BLD AUTO: 56.9 % (ref 42.7–76)
NRBC BLD AUTO-RTO: 0 /100 WBC (ref 0–0.2)
PLATELET # BLD AUTO: 102 10*3/MM3 (ref 140–450)
PMV BLD AUTO: 10.4 FL (ref 6–12)
POTASSIUM BLD-SCNC: 4.2 MMOL/L (ref 3.5–5.2)
PROT SERPL-MCNC: 7.6 G/DL (ref 6–8.5)
RBC # BLD AUTO: 4.65 10*6/MM3 (ref 4.14–5.8)
SODIUM BLD-SCNC: 142 MMOL/L (ref 136–145)
T3FREE SERPL-MCNC: 3.46 PG/ML (ref 2–4.4)
T4 FREE SERPL-MCNC: 1.38 NG/DL (ref 0.93–1.7)
TSH SERPL DL<=0.05 MIU/L-ACNC: 2.1 UIU/ML (ref 0.27–4.2)
WBC NRBC COR # BLD: 8.58 10*3/MM3 (ref 3.4–10.8)

## 2019-11-22 PROCEDURE — 84439 ASSAY OF FREE THYROXINE: CPT

## 2019-11-22 PROCEDURE — 80053 COMPREHEN METABOLIC PANEL: CPT

## 2019-11-22 PROCEDURE — 85025 COMPLETE CBC W/AUTO DIFF WBC: CPT

## 2019-11-22 PROCEDURE — 25010000002 ATEZOLIZUMAB 1200 MG/20ML SOLUTION 20 ML VIAL: Performed by: INTERNAL MEDICINE

## 2019-11-22 PROCEDURE — 99215 OFFICE O/P EST HI 40 MIN: CPT | Performed by: INTERNAL MEDICINE

## 2019-11-22 PROCEDURE — 84443 ASSAY THYROID STIM HORMONE: CPT

## 2019-11-22 PROCEDURE — 36415 COLL VENOUS BLD VENIPUNCTURE: CPT

## 2019-11-22 PROCEDURE — 77334 RADIATION TREATMENT AID(S): CPT | Performed by: RADIOLOGY

## 2019-11-22 PROCEDURE — 77290 THER RAD SIMULAJ FIELD CPLX: CPT | Performed by: RADIOLOGY

## 2019-11-22 PROCEDURE — 96413 CHEMO IV INFUSION 1 HR: CPT

## 2019-11-22 PROCEDURE — 84481 FREE ASSAY (FT-3): CPT

## 2019-11-22 RX ORDER — SODIUM CHLORIDE 9 MG/ML
250 INJECTION, SOLUTION INTRAVENOUS ONCE
Status: COMPLETED | OUTPATIENT
Start: 2019-11-22 | End: 2019-11-22

## 2019-11-22 RX ORDER — SODIUM CHLORIDE 9 MG/ML
250 INJECTION, SOLUTION INTRAVENOUS ONCE
Status: CANCELLED | OUTPATIENT
Start: 2019-11-22

## 2019-11-22 RX ADMIN — SODIUM CHLORIDE 250 ML: 9 INJECTION, SOLUTION INTRAVENOUS at 12:28

## 2019-11-22 RX ADMIN — ATEZOLIZUMAB 1200 MG: 1200 INJECTION, SOLUTION INTRAVENOUS at 12:28

## 2019-11-22 NOTE — PROGRESS NOTES
[facility]  HEMATOLOGY & ONCOLOGY        Subjective     VISIT DIAGNOSIS:   No diagnosis found.    REASON FOR VISIT:     Chief Complaint   Patient presents with   • Hepatocellular Carcinoma     Here for tx. Had bladder sx last friday.        HEMATOLOGY / ONCOLOGY HISTORY:      Malignant neoplasm of urinary bladder (CMS/HCC)     Initial Diagnosis     Malignant neoplasm of urinary bladder (CMS/HCC)         8/13/2018 Imaging     CT Abd/Pelvis (Adal interpretation from outside facility):  1.  Multiple foci of extraluminal gas posterior to the cecum   suspected to represent bowel perforation most likely involving the   cecum. Medially the appendix appears unremarkable and is thought   unlikely to represent the source. No definite mass appreciated within   the limitations of unenhanced CT technique; endoscopic follow-up   recommended.  2.  Abdominopelvic free fluid with a large centrally calcified   structure in the pouch of Cyrus. This might potential be related to   the above-described bowel perforation, however this is suspected to   more likely be chronic and of indeterminate etiology. A clear urinary   or biliary source is not identified.  3.  Borderline cirrhotic hepatic configuration. Probable mild varices   adjacent to the distal esophagus and proximal stomach. Nonspecific   fat stranding in the upper abdominal mesentery and retroperitoneum.   Mild gastrohepatic adenopathy, uncertain etiology, possibly reactive;   neoplastic etiology cannot be excluded. Correlation with prior   imaging recommended.  4.  Small hypoattenuating area in the posterior RIGHT hepatic lobe   incompletely characterized ; given the borderline cirrhotic   configuration and nonemergent follow-up hepatic protocol CT or MRI is   recommended for further evaluation. Small renal lesions too small to   characterize but statistically likely a cyst.  5.  Incidental and other findings as described above including right   renal  stone, small inguinal hernias, degenerative disc disease,   atherosclerosis.         8/14/2018 Imaging     CT Abd/Pelvis:  1. Similar in appearance to the prior study, there are findings   suspicious for bowel perforation, with extraluminal gas seen adjacent   to the cecum and terminal ileum. This was subsequently evaluated at   the operating room.  2. Small volume of free fluid in the abdomen and pelvis. There is   also a rounded radiodensity free-floating within ascitic fluid in the   pelvis, which is of on certain etiology.. At the time of dictation,   this had been removed at laparoscopy.  3. There are two enhancing lesions arising from the urinary bladder   wall, suspicious for bladder neoplasms. These were subsequently   evaluated at cystoscopy.  4. Morphologic features of cirrhosis. 2.1 cm lesion in the right lobe   of the liver, not definitively characterized on this single phase   study, but suspicious for hepatocellular carcinoma. Recommend further   evaluation with dedicated liver protocol CT or MRI as well as   correlation with serum alpha-fetoprotein levels.  5. Additional findings as above.         8/14/2018 Biopsy     Diagnosis  1)  URINARY BLADDER, LEFT, TRANSURETHRAL RESECTION: NONINVASIVE HIGH GRADE PAPILLARY UROTHELIAL CARCINOMA; NO MUSCULARIS PROPRIA PRESENT.      2)  URINARY BLADER, RIGHT WALL, TRANSURETHRAL RESECTION: NONINVASIVE HIGH GRADE PAPILLARY UROTHELIAL CARCINOMA WITH EXTENSIVE CAUTERY ARTIFACT; NO MUSCULARIS PROPRIA PRESENT.            9/28/2018 Biopsy     TURBT - BHP:  Final Diagnosis   1.  Urinary bladder, lateral tumor, transurethral resection:  Noninvasive, high-grade papillary urothelial carcinoma  Acute and chronic inflammation  Muscularis propria is present and negative     2.  Urinary bladder, posterior tumor, transurethral resection:  Noninvasive, high-grade papillary urothelial carcinoma  Chronic inflammation  Muscularis propria is not identified     3.  Urinary bladder,  right lateral tumor, transurethral resection:   Severe cautery artifact  Intact urothelium is not visualized        AJCC cancer stage: pTa              1/21/2019 Biopsy     TURBT - BHP:  Final Diagnosis   1.  Bladder, posterior tumor #1, transurethral resection:  Noninvasive high-grade papillary urothelial carcinoma, pTa.  Detrusor muscle is not present for evaluation     2.  Bladder, area of right ureteral orifice, biopsy:  Urothelial carcinoma in situ, pTis.     3.  Bladder, posterior tumor #2, transurethral resection:  Noninvasive high-grade papillary urothelial carcinoma, pTa.  Detrusor muscle is not present for evaluation              4/18/2019 Imaging     CT Abd/Pelvis (Adal interpretation from outside facility)  1. Redemonstration of a treated lesion in segment VII of the right   hepatic lobe, without evidence of arterial enhancement or washout,   compatible with a LR-TR nonviable.  2. No new liver lesion is identified. Portal vein is patent, without   evidence of thrombus.  3. Cirrhosis, with evidence of portal hypertension         5/20/2019 Biopsy     TURBT - BHP:  Final Diagnosis   1.  Urinary bladder, left wall, biopsy:  A.  Invasive urothelial carcinoma with nested features invading the lamina propria.  B.  No muscularis propria smooth muscle present for evaluation.     2.  Urinary bladder, posterior wall, biopsy:  A.  Invasive urothelial carcinoma with nested features invading the lamina propria.  B.  No muscularis propria smooth muscle present for evaluation.     3.  Urinary bladder, right wall, biopsy: Invasive papillary urothelial carcinoma, high-grade.     Comment: The tumor invades the muscularis propria.  Focally, invasive carcinoma is seen involving thin smooth muscle bundles.  It is difficult to distinguish whether these muscle bundles represent muscularis propria that has been partially destroyed by the invasive cancer or muscularis mucosa from the lamina propria.  Additional tissue sampling  is recommended.     An extra departmental consultation was obtained from Dr. Jhonatan Parson at [facility].  His diagnostic impressions are reflected in the above diagnoses.  Please refer to the complete pathology report from Dr. Parson which is appended.              7/24/2019 Imaging     CT Chest/Abd/Pelvis:  1. Post procedural changes related to prior TACE in segment seven of   the right hepatic lobe. There is a nodular focus of arterial   enhancement at the posterior margin of the treated lesion, compatible   with LR-TR viable. Please note, on the pretreatment, outside MRI from   9/14/2018, this treated lesion had rim-like arterial phase   hyperenhancement, and would have been best characterized as LI-RADS -   M.  2. 1.2 cm soft tissue nodule along the right posterior bladder wall,   decreased in size since 8/14/2018, correlating with one of patient's   known bladder neoplasms. A second previously seen lesion along the   left posterior bladder wall is no longer clearly definable.  3. No findings to suggest metastatic disease in the abdomen or pelvis.  4. Small, nonspecific noncalcified nodule in the right upper lobe.   Recommend attention on follow-up.  5. Numerous additional findings as above.         7/24/2019 Biopsy     TUBRT - Adal:  Diagnosis    1. URINARY BLADDER, RIGHT LATERAL WALL, TRANSURETHRAL RESECTION:  - INVASIVE HIGH GRADE UROTHELIAL CARCINOMA.  - TUMOR INVADES MUSCULARIS PROPRIA.    2. URINARY BLADDER, BASE, TRANSURETHRAL RESECTION:  - UROTHELIAL CARCINOMA IN SITU.  - MUSCULARIS PROPRIA IS PRESENT.           10/1/2019 Imaging     CT Abd/Pelvis:  Impression:  1.  No signs of residual disease, LR TR nonviable.  2.  No new lesions.  3.  Signs of portal hypertension as before.             11/6/2019 Imaging     Ct Chest With Contrast  Result Date: 11/6/2019  1. No evidence of intrathoracic metastatic disease is identified. Moderate emphysema is present. There are a few focal areas of  interstitial scarring in the lungs. There is a 3 mm noncalcified subpleural nodule in the right upper lobe which appears benign. 2. Heavy coronary artery calcifications are present. 3. Mild thickening of the wall of the distal esophagus which may be related to inflammation from reflux disease. Clinical correlation is recommended. 4. Small 1 cm cyst at the upper pole of the left kidney.       Ct Abdomen Pelvis With Contrast  Result Date: 11/6/2019  1. No evidence of intra-abdominal pelvic metastatic disease. There were 3 small liver masses seen on previous MRI, the to similar mass seen in segment 8 has regressed and appears calcified, compatible with the history of interventional chemoembolization. The other 2 liver lesions are not visualized. 2. Poor distention of the bladder with mild bladder wall thickening and no discrete bladder mass. 3. Mild sigmoid diverticulosis. 4. Swirling of the central mesentery compatible with mild mesenteric volvulus, without complication. 5. Heavy aortic atherosclerotic calcification and calcification within the proximal SMA, with no signs of mesenteric ischemia.            11/15/2019 Surgery     Surgery       Procedure: TURBT        Final Diagnosis   1.  Posterior wall bladder tumor, TUR:  High-grade urothelial carcinoma, see comment   2.  Right lateral wall bladder tumor, TUR:  High-grade urothelial carcinoma, see comment   3.  Dome bladder tumor, TUR:  Negative for tumor                        Chemotherapy Treatment History     Treatment Plan: OP BLADDER Atezolizumab    Medications: Atezolizumab (TECENTRIQ) 1,200 mg in sodium chloride 0.9 % 270 mL chemo IVPB, 1,200 mg, 0 of 6 cycles      Reason treatment was stopped:  [Plan is still active]     Cancer Staging Information:  Cancer Staging  No matching staging information was found for the patient.      INTERVAL HISTORY  Patient ID: Elder Mina is a 69 y.o. year old male with medical hx sig for HCC s/p ablation, now with  recurrent bladder cancer suspected to be T2 muscle invasive. TURBT planned coming week. He has been determined not to be a surgical candidate by urologist at [facility]. He saw rad/onc and he is a candidate for radiation. He is being seen in consideration for systemic chemotherapy.    11/22/19: s/p port placement. He will be seeing rad/onc for consultation today  -Currently have no complain. He had bladder polyp taken out yesterday and is having hematuria. This is expected for up to a week  --denies dysuria, sob, cp, n/v, abdominal pain, fever, chills, dizziness. Ros unremarkable. PE reveals non focal.    PE remains the same    Review of Systems     See above. Otherwise negative.    Medications:    Current Outpatient Medications   Medication Sig Dispense Refill   • ALPRAZolam (XANAX) 1 MG tablet Take 1 mg by mouth 2 (Two) Times a Day As Needed for Anxiety.     • budesonide-formoterol (SYMBICORT) 160-4.5 MCG/ACT inhaler Inhale 2 puffs Daily.     • ipratropium-albuterol (COMBIVENT RESPIMAT)  MCG/ACT inhaler Inhale 1 puff 4 (Four) Times a Day As Needed for Wheezing.     • O2 (OXYGEN) Inhale 2 L/min Take As Directed. nightly and when short of air     • oxybutynin (DITROPAN) 5 MG tablet Take 1 tablet by mouth Every 8 (Eight) Hours As Needed (prn bladder spasms). 30 tablet 1   • phenazopyridine (PYRIDIUM) 100 MG tablet Take 1 tablet by mouth 3 (Three) Times a Day As Needed (urinary burning). 20 tablet 0   • traMADol (ULTRAM) 50 MG tablet Take 1 tablet by mouth Every 6 (Six) Hours As Needed for Moderate Pain  (POSTOP PAIN). 8 tablet 0     No current facility-administered medications for this visit.        ALLERGIES:    Allergies   Allergen Reactions   • Penicillins Rash       Objective      Vitals:    11/22/19 1057   BP: 124/60   Pulse: 88   Resp: 16   Temp: 98.5 °F (36.9 °C)   SpO2: 90%       Current Status 11/22/2019   ECOG score 0       General Appearance: Patient is awake, alert, oriented and in no acute distress.  Patient is welldeveloped, wellnourished, and appears stated age.  HEENT: Normocephalic. Sclerae clear, conjunctiva pink, extraocular movements intact, pupils, round, reactive to light and  accommodation. Mouth and throat are clear with moist oral mucosa.  NECK: Supple, no jugular venous distention, thyroid not enlarged.  LYMPH: No cervical, supraclavicular, axillary, or inguinal lymphadenopathy.  CHEST: Equal bilateral expansion, AP  diameter normal, resonant percussion note  LUNGS: Good air movement, no rales, rhonchi, rubs or wheezes with auscultation  CARDIO: Regular sinus rhythm, no murmurs, gallops or rubs.  ABDOMEN: Nondistended, soft, No tenderness, no guarding, no rebound, No hepatosplenomegaly. No abdominal masses. Bowel sounds positive. No hernia  GENITALIA: Not examined.  BREASTS: Not examined.  MUSKEL: No joint swelling, decreased motion, or inflammation  EXTREMS: No edema, clubbing, cyanosis, No varicose veins.  NEURO: Grossly nonfocal, Gait is coordinated and smooth, Cognition is preserved.  SKIN: No rashes, no ecchymoses, no petechia.  PSYCH: Oriented to time, place and person. Memory is preserved. Mood and affect appear normal      RECENT LABS:  Lab on 11/22/2019   Component Date Value Ref Range Status   • WBC 11/22/2019 8.58  3.40 - 10.80 10*3/mm3 Final   • RBC 11/22/2019 4.65  4.14 - 5.80 10*6/mm3 Final   • Hemoglobin 11/22/2019 15.5  13.0 - 17.7 g/dL Final   • Hematocrit 11/22/2019 46.2  37.5 - 51.0 % Final   • MCV 11/22/2019 99.4* 79.0 - 97.0 fL Final   • MCH 11/22/2019 33.3* 26.6 - 33.0 pg Final   • MCHC 11/22/2019 33.5  31.5 - 35.7 g/dL Final   • RDW 11/22/2019 13.8  12.3 - 15.4 % Final   • RDW-SD 11/22/2019 50.7  37.0 - 54.0 fl Final   • MPV 11/22/2019 10.4  6.0 - 12.0 fL Final   • Platelets 11/22/2019 102* 140 - 450 10*3/mm3 Final   • Neutrophil % 11/22/2019 56.9  42.7 - 76.0 % Final   • Lymphocyte % 11/22/2019 24.4  19.6 - 45.3 % Final   • Monocyte % 11/22/2019 13.2* 5.0 - 12.0 % Final   •  Eosinophil % 11/22/2019 4.7  0.3 - 6.2 % Final   • Basophil % 11/22/2019 0.7  0.0 - 1.5 % Final   • Immature Grans % 11/22/2019 0.1  0.0 - 0.5 % Final   • Neutrophils, Absolute 11/22/2019 4.84  1.70 - 7.00 10*3/mm3 Final   • Lymphocytes, Absolute 11/22/2019 2.07  0.70 - 3.10 10*3/mm3 Final   • Monocytes, Absolute 11/22/2019 1.12* 0.10 - 0.90 10*3/mm3 Final   • Eosinophils, Absolute 11/22/2019 0.40  0.00 - 0.40 10*3/mm3 Final   • Basophils, Absolute 11/22/2019 0.06  0.00 - 0.20 10*3/mm3 Final   • Immature Grans, Absolute 11/22/2019 0.01  0.00 - 0.05 10*3/mm3 Final   • nRBC 11/22/2019 0.0  0.0 - 0.2 /100 WBC Final       RADIOLOGY:  Ct Chest With Contrast    Result Date: 11/6/2019  Narrative: EXAMINATION: CT CHEST W CONTRAST- 11/6/2019 8:34 AM CST  HISTORY: C67.9-Malignant neoplasm of bladder, unspecified  DOSE: 424 mGycm (Automatic exposure control technique was implemented in an effort to keep the radiation dose as low as possible without compromising image quality)  REPORT: Spiral CT of the chest was performed after administration of intravenous contrast from the thoracic inlet through the upper abdomen. Reconstructed coronal and sagittal images were also reviewed.  Comparison: There are no correlative imaging studies for comparison.  Review of lung windows demonstrates extensive air trapping and diffuse changes of centrilobular emphysema. No pulmonary infiltrate or consolidation is identified. There is a small noncalcified subpleural nodule in the right upper lobe axial image 78, measuring 3 mm. This is likely benign. There are a few scattered calcified granulomas. There is a small area of interstitial thickening in the right upper lobe axial images 97 through 99. This is most likely related to chronic scarring. There is a small focus of interstitial thickening in the left apex image 28 most likely related to scar. No pneumothorax or pleural effusion is identified. The thyroid gland is homogeneous and unremarkable.  The airways are patent. A small amount calcified plaque is noted in the aortic arch, there is mild ectasia of the ascending aorta with a maximum diameter 3.2 cm. No evidence of aortic dissection. Heavy coronary artery calcifications are identified. Heart size is normal. The pulmonary arteries are normal in caliber. There is no evidence of intrathoracic lymph adenopathy. A few calcified mediastinal lymph nodes are present. There is mild thickening of the wall of the distal esophagus which may be related to mild inflammation and reflux disease. In the upright view, there are scattered calcified granulomas within the liver and spleen. The stomach is decompressed. A small cyst is noted in the upper pole left kidney measuring about 1 cm. Other findings in the abdomen are described in a separate report today. Review of bone windows is unremarkable.      Impression: 1. No evidence of intrathoracic metastatic disease is identified. Moderate emphysema is present. There are a few focal areas of interstitial scarring in the lungs. There is a 3 mm noncalcified subpleural nodule in the right upper lobe which appears benign. 2. Heavy coronary artery calcifications are present. 3. Mild thickening of the wall of the distal esophagus which may be related to inflammation from reflux disease. Clinical correlation is recommended. 4. Small 1 cm cyst at the upper pole of the left kidney.   This report was finalized on 11/06/2019 08:41 by Dr. James Briones MD.    Ct Abdomen Pelvis With Contrast    Result Date: 11/6/2019  Narrative: EXAMINATION: CT ABDOMEN PELVIS W CONTRAST- 11/6/2019 8:41 AM CST  HISTORY: C67.9-Malignant neoplasm of bladder, unspecified. HISTORY of liver mass with interventional chemoembolization.  DOSE: 631 mGycm (Automatic exposure control technique was implemented in an effort to keep the radiation dose as low as possible without compromising image quality)  REPORT: Spiral CT of the abdomen and pelvis was performed  after administration of intravenous contrast from the lung bases through the pubic symphysis. Reconstructed coronal and sagittal images are also reviewed.  COMPARISON: MRI of the abdomen with and without contrast 09/14/2018.  Review of lung windows demonstrates hyperinflation of the lung bases with moderate emphysema. No pleural effusion is identified. There is mild thickening of the wall of the distal esophagus, mild esophagitis may be present. Correlate clinically for GERD. There are scattered calcified granulomas within the liver and spleen. Previous MRI of the abdomen demonstrates 3 suspicious liver lesions. The largest lesion, in segment 8 on previous MRI measured 2 cm, shows regression and is now partially calcified. This is on axial image 15 and measures approximately 7.8 mm. The other 2 liver lesion seen on previous MRI are not visible. No new liver lesion is seen. The stomach is decompressed. The pancreas and adrenal glands are within normal limits. The kidneys enhance normally, there is no hydronephrosis. There is a small cyst at the upper pole the left kidney that measures approximately 1 cm. This appears benign. There a few normal sized lymph nodes at the gastrohepatic ligament level and no evidence of intra-abdominal or pelvic lymphadenopathy is identified. The gallbladder is unremarkable. There is moderate calcified plaque within the abdominal aorta, heavy plaque is present at the proximal SMA. There is poor distention of the bladder with mild circumferential mural thickening, no discrete liver mass is identified. There is a small calcification or surgical clip associated with the right bladder wall. Mild prostate enlargement is present. No free fluid or free air is identified. Bowel loops are normal in caliber, there are scattered sigmoid diverticula without evidence of acute diverticulitis. There is swirling of the central mesentery, approximately 360 degrees, compatible with mesenteric volvulus,  with no complication. There is fatty infiltration of the inguinal canals. Review of bone windows shows no evidence of osseous metastases. There are advanced degenerative changes at L4-5 and L5-S1.      Impression: 1. No evidence of intra-abdominal pelvic metastatic disease. There were 3 small liver masses seen on previous MRI, the to similar mass seen in segment 8 has regressed and appears calcified, compatible with the history of interventional chemoembolization. The other 2 liver lesions are not visualized. 2. Poor distention of the bladder with mild bladder wall thickening and no discrete bladder mass. 3. Mild sigmoid diverticulosis. 4. Swirling of the central mesentery compatible with mild mesenteric volvulus, without complication. 5. Heavy aortic atherosclerotic calcification and calcification within the proximal SMA, with no signs of mesenteric ischemia.   This report was finalized on 11/06/2019 08:55 by Dr. James Briones MD.           Assessment/Plan 70 yo with HCC s/p ablation , with recurrent T2 bladder cancer, here to initiate tecentriq    1. MIBC: TURBT planned for Nov 12th.   -ct c/a/p for staging  -pt already saw rad/onc. 7 weeks of xrt planned.  -given his overall comorbidity ie liver cirrhosis with HCC, not platinum eligible. Also not a surgical candidate for cycstectomy per urology.  -plan for atezolizumab plus cc-XRT  -pros and cons of immunotherapy discussed with pt and wife. I gave them literature on atezolizumab  -They will be seen for chemo teaching with [facility] APRN  -pt advised not to start xrt without starting systemic therapy  -they were given the opportunity to ask questions which I answered to my best ability and they appeared to understand.      11/22/19: CT c/a/p 11/6/19 with no evidence of metastatic dz  --labs reviewed with pt wbc 8.58, Hg 15.5, plt  102. Port placed  -ok for tecentriq    2. HCC s/p ablation at [facility]    3. Anxiety: on xanax rosalind    4.COPD: on symbicort and combivent      Logan Resendiz MD    11/22/2019    11:19 AM

## 2019-11-22 NOTE — PROGRESS NOTES
"12:00:  Called Dr. Resendiz/Jeremy in regards that the patient had polyp removed last Friday and is still urinating blood \"some to a lot\".  Per post op instructions he has, says normal for up to 2 weeks.  Per. Dr. Resendiz/Jeremy, okay to proceed with treatment. AMBROSIO Calvin  "

## 2019-12-02 ENCOUNTER — HOSPITAL ENCOUNTER (OUTPATIENT)
Dept: RADIATION ONCOLOGY | Facility: HOSPITAL | Age: 69
Setting detail: RADIATION/ONCOLOGY SERIES
End: 2019-12-02

## 2019-12-03 PROCEDURE — 77334 RADIATION TREATMENT AID(S): CPT | Performed by: RADIOLOGY

## 2019-12-03 PROCEDURE — 77300 RADIATION THERAPY DOSE PLAN: CPT | Performed by: RADIOLOGY

## 2019-12-03 PROCEDURE — 77295 3-D RADIOTHERAPY PLAN: CPT | Performed by: RADIOLOGY

## 2019-12-05 ENCOUNTER — TELEPHONE (OUTPATIENT)
Dept: ONCOLOGY | Facility: CLINIC | Age: 69
End: 2019-12-05

## 2019-12-05 DIAGNOSIS — C67.9 MALIGNANT NEOPLASM OF URINARY BLADDER, UNSPECIFIED SITE (HCC): ICD-10-CM

## 2019-12-05 DIAGNOSIS — Z79.899 ON ANTINEOPLASTIC CHEMOTHERAPY: ICD-10-CM

## 2019-12-05 NOTE — TELEPHONE ENCOUNTER
Received message from answering service regarding patient Elder Mina, he states that his wife ended up speaking to Dr Resendiz last nite 12/4/19, due to persistent elevated temp of 100.4. He was instructed to take Aleve and if temp did not come down then he should go to the ER Dept for evaluation (which it did). Today he states he is feeling better, but did feel pretty rough yesterday all day long. His last chemotherapy txt was two weeks ago and is jordana to begin radiation therapy this coming Monday 12/9/19. He was encouraged to take his temp 2-3 times per day and if he did run a low grade temp, has chills, or overall body aches and discomfort again he would need to go to the ER dept, he v/u. Encouraged to call if he has other questions or concerns.

## 2019-12-06 DIAGNOSIS — C67.9 MALIGNANT NEOPLASM OF URINARY BLADDER, UNSPECIFIED SITE (HCC): Primary | ICD-10-CM

## 2019-12-07 ENCOUNTER — NURSE TRIAGE (OUTPATIENT)
Dept: CALL CENTER | Facility: HOSPITAL | Age: 69
End: 2019-12-07

## 2019-12-07 NOTE — TELEPHONE ENCOUNTER
Wife of cancer patient calling.  She spoke with Dr. Goodson on 12/5/19 about fever 100.4 and was told  to give aleve and if does not come down to go to ER. Wants to know if temp 99.8 should do anything about. Advised no, that 100.4 or higher is the level of concern and that she should check his temperature when the aleve has worn off to get accurate reading, is fine to check it at other times as well but if concern for fever then check it before administer the next dose of aleve.     Reason for Disposition  • [1] Fever AND [2] no signs of serious infection or localizing symptoms (all other triage questions negative)    Additional Information  • Negative: Shock suspected (e.g., cold/pale/clammy skin, too weak to stand, low BP, rapid pulse)  • Negative: Difficult to awaken or acting confused (e.g., disoriented, slurred speech)  • Negative: Bluish (or gray) lips or face now  • Negative: New onset rash with many purple (or blood-colored) spots or dots  • Negative: Sounds like a life-threatening emergency to the triager  • Negative: Other symptom is present, see that guideline  (e.g., symptoms of cough, runny nose, sore throat, earache, abdominal pain, diarrhea, vomiting)  • Negative: Fever > 104 F (40 C)  • Negative: [1] Neutropenia known or suspected (e.g., recent cancer chemotherapy) AND [2] fever > 100.4 F (38.0 C)  • Negative: [1] Neutropenia known or suspected (e.g., recent cancer chemotherapy) AND [2] signs or symptoms of suspected infection are present  • Negative: [1] Headache AND [2] stiff neck (can't touch chin to chest)  • Negative: Difficulty breathing  • Negative: [1] Drinking very little AND [2] dehydration suspected (e.g., no urine > 12 hours, very dry mouth, very lightheaded)  • Negative: Patient sounds very sick or weak to the triager  (Exception: mild weakness and hasn't taken fever medicine)  • Negative: [1] Fever > 101 F (38.3 C) AND [2] age > 60  • Negative: [1] Fever > 101 F (38.3 C) AND [2]  "co-morbidity risk factor for serious infection (e.g., COPD, heart failure, liver failure, renal failure with dialysis )  • Negative: [1] Fever > 100.0 F (37.8 C) AND [2] bedridden (e.g., nursing home patient, CVA, chronic illness, recovering from surgery)  • Negative: [1] Fever >  100.5 F (38.1 C) AND weak immune system (e.g., diabetes, splenectomy, leukemia, HIV infection, chronic steroid use)  • Negative: [1] Fever > 100.0 F (37.8 C) AND [2] indwelling urinary catheter (e.g., Wetzel, Coude)  • Negative: [1] Fever > 100.0 F (37.8 C) AND [2] port (portacath), central line, or PICC line  • Negative: [1] Fever > 100.0 F (37.8 C) AND [2] surgery in the last month  • Negative: Fever present > 3 days (72 hours)  • Negative: [1] Fever > 100.0 F (37.8 C) AND [2] foreign travel to a developing country in the last month    Answer Assessment - Initial Assessment Questions  1. TEMPERATURE: \"What is the most recent temperature?\"  \"How was it measured?\"       99.8  2. ONSET: \"When did the fever start?\"       Pt has been checking it for two weeks since last treatment  3. SYMPTOMS: \"Do you have any other symptoms besides the fever?\"  (e.g., sore throat, earache, runny nose, cough, rash, diarrhea, vomiting, abdominal pain, painful urination)      None  4. CONTACTS: \"Does anyone else in the family have an infection?\"      no  5. FEVER TREATMENT: \"What have you done so far to treat this fever?\" (e.g., medications)      Was told by oncologist if have fever 100.4 or higher to take aleve and if it does not not come within an hour to go to ER.   6. CANCER: \"What type of cancer do you have?\"      Bladder cancer  7. CANCER - TREATMENT: \"What cancer treatments have you received?\" \"When did you last receive them?\" (e.g., recent surgery, radiation, or chemotherapy). If triager has access to the patient's medical record, triager should review treatments and administration dates.      Immune therapy  8. CANCER - NEUTROPENIA RISK: \"Have you " "received chemotherapy recently? If Yes, \"When was it and what was your last CBC and ANC (absolute neutrophil count)?\" \"Were you told that your white cell count was low?\" If triager has access to the patient's medical record, triager should review most recent labs. An ANC less than 1,000 - 1,500 means that the neutrophils are low and the immune system is weak.      Immune therapy    Protocols used: CANCER - FEVER-ADULT-AH      "

## 2019-12-09 ENCOUNTER — HOSPITAL ENCOUNTER (OUTPATIENT)
Dept: RADIATION ONCOLOGY | Facility: HOSPITAL | Age: 69
Setting detail: RADIATION/ONCOLOGY SERIES
Discharge: HOME | End: 2019-12-09

## 2019-12-09 PROCEDURE — 77412 RADIATION TX DELIVERY LVL 3: CPT | Performed by: RADIOLOGY

## 2019-12-09 PROCEDURE — 77280 THER RAD SIMULAJ FIELD SMPL: CPT | Performed by: RADIOLOGY

## 2019-12-10 ENCOUNTER — HOSPITAL ENCOUNTER (OUTPATIENT)
Dept: RADIATION ONCOLOGY | Facility: HOSPITAL | Age: 69
Setting detail: RADIATION/ONCOLOGY SERIES
Discharge: HOME | End: 2019-12-10

## 2019-12-10 PROCEDURE — 77417 THER RADIOLOGY PORT IMAGE(S): CPT | Performed by: RADIOLOGY

## 2019-12-10 PROCEDURE — 77412 RADIATION TX DELIVERY LVL 3: CPT | Performed by: RADIOLOGY

## 2019-12-11 ENCOUNTER — HOSPITAL ENCOUNTER (OUTPATIENT)
Dept: RADIATION ONCOLOGY | Facility: HOSPITAL | Age: 69
Setting detail: RADIATION/ONCOLOGY SERIES
Discharge: HOME | End: 2019-12-11

## 2019-12-11 PROCEDURE — 77336 RADIATION PHYSICS CONSULT: CPT | Performed by: RADIOLOGY

## 2019-12-11 PROCEDURE — 77412 RADIATION TX DELIVERY LVL 3: CPT | Performed by: RADIOLOGY

## 2019-12-12 ENCOUNTER — HOSPITAL ENCOUNTER (OUTPATIENT)
Dept: RADIATION ONCOLOGY | Facility: HOSPITAL | Age: 69
Setting detail: RADIATION/ONCOLOGY SERIES
Discharge: HOME | End: 2019-12-12

## 2019-12-12 PROCEDURE — 77412 RADIATION TX DELIVERY LVL 3: CPT | Performed by: RADIOLOGY

## 2019-12-13 ENCOUNTER — OFFICE VISIT (OUTPATIENT)
Dept: ONCOLOGY | Facility: CLINIC | Age: 69
End: 2019-12-13

## 2019-12-13 ENCOUNTER — HOSPITAL ENCOUNTER (OUTPATIENT)
Dept: RADIATION ONCOLOGY | Facility: HOSPITAL | Age: 69
Setting detail: RADIATION/ONCOLOGY SERIES
Discharge: HOME | End: 2019-12-13

## 2019-12-13 ENCOUNTER — LAB (OUTPATIENT)
Dept: LAB | Facility: HOSPITAL | Age: 69
End: 2019-12-13

## 2019-12-13 ENCOUNTER — INFUSION (OUTPATIENT)
Dept: ONCOLOGY | Facility: HOSPITAL | Age: 69
End: 2019-12-13

## 2019-12-13 VITALS
TEMPERATURE: 98.3 F | HEIGHT: 70 IN | OXYGEN SATURATION: 91 % | RESPIRATION RATE: 16 BRPM | HEART RATE: 76 BPM | BODY MASS INDEX: 23.51 KG/M2 | WEIGHT: 164.2 LBS | DIASTOLIC BLOOD PRESSURE: 56 MMHG | SYSTOLIC BLOOD PRESSURE: 118 MMHG

## 2019-12-13 VITALS
BODY MASS INDEX: 23.51 KG/M2 | WEIGHT: 164.2 LBS | HEIGHT: 70 IN | TEMPERATURE: 98.6 F | HEART RATE: 87 BPM | DIASTOLIC BLOOD PRESSURE: 55 MMHG | SYSTOLIC BLOOD PRESSURE: 97 MMHG | RESPIRATION RATE: 18 BRPM | OXYGEN SATURATION: 92 %

## 2019-12-13 DIAGNOSIS — C67.9 MALIGNANT NEOPLASM OF URINARY BLADDER, UNSPECIFIED SITE (HCC): ICD-10-CM

## 2019-12-13 DIAGNOSIS — C67.9 MALIGNANT NEOPLASM OF URINARY BLADDER, UNSPECIFIED SITE (HCC): Primary | ICD-10-CM

## 2019-12-13 DIAGNOSIS — Z79.899 ON ANTINEOPLASTIC CHEMOTHERAPY: ICD-10-CM

## 2019-12-13 DIAGNOSIS — Z79.899 ON ANTINEOPLASTIC CHEMOTHERAPY: Primary | ICD-10-CM

## 2019-12-13 LAB
ALBUMIN SERPL-MCNC: 4.3 G/DL (ref 3.5–5.2)
ALBUMIN/GLOB SERPL: 1.1 G/DL
ALP SERPL-CCNC: 169 U/L (ref 39–117)
ALT SERPL W P-5'-P-CCNC: 32 U/L (ref 1–41)
ANION GAP SERPL CALCULATED.3IONS-SCNC: 8 MMOL/L (ref 5–15)
AST SERPL-CCNC: 34 U/L (ref 1–40)
BASOPHILS # BLD AUTO: 0.04 10*3/MM3 (ref 0–0.2)
BASOPHILS NFR BLD AUTO: 0.5 % (ref 0–1.5)
BILIRUB SERPL-MCNC: 0.7 MG/DL (ref 0.2–1.2)
BUN BLD-MCNC: 17 MG/DL (ref 8–23)
BUN/CREAT SERPL: 24.6 (ref 7–25)
CALCIUM SPEC-SCNC: 9.9 MG/DL (ref 8.6–10.5)
CHLORIDE SERPL-SCNC: 98 MMOL/L (ref 98–107)
CO2 SERPL-SCNC: 35 MMOL/L (ref 22–29)
CREAT BLD-MCNC: 0.69 MG/DL (ref 0.76–1.27)
DEPRECATED RDW RBC AUTO: 51.9 FL (ref 37–54)
EOSINOPHIL # BLD AUTO: 0.24 10*3/MM3 (ref 0–0.4)
EOSINOPHIL NFR BLD AUTO: 3.3 % (ref 0.3–6.2)
ERYTHROCYTE [DISTWIDTH] IN BLOOD BY AUTOMATED COUNT: 14.3 % (ref 12.3–15.4)
GFR SERPL CREATININE-BSD FRML MDRD: 113 ML/MIN/1.73
GLOBULIN UR ELPH-MCNC: 3.9 GM/DL
GLUCOSE BLD-MCNC: 103 MG/DL (ref 65–99)
HCT VFR BLD AUTO: 47.1 % (ref 37.5–51)
HGB BLD-MCNC: 16 G/DL (ref 13–17.7)
HOLD SPECIMEN: NORMAL
HOLD SPECIMEN: NORMAL
IMM GRANULOCYTES # BLD AUTO: 0.03 10*3/MM3 (ref 0–0.05)
IMM GRANULOCYTES NFR BLD AUTO: 0.4 % (ref 0–0.5)
LYMPHOCYTES # BLD AUTO: 1.9 10*3/MM3 (ref 0.7–3.1)
LYMPHOCYTES NFR BLD AUTO: 25.7 % (ref 19.6–45.3)
MCH RBC QN AUTO: 33.7 PG (ref 26.6–33)
MCHC RBC AUTO-ENTMCNC: 34 G/DL (ref 31.5–35.7)
MCV RBC AUTO: 99.2 FL (ref 79–97)
MONOCYTES # BLD AUTO: 1.12 10*3/MM3 (ref 0.1–0.9)
MONOCYTES NFR BLD AUTO: 15.2 % (ref 5–12)
NEUTROPHILS # BLD AUTO: 4.05 10*3/MM3 (ref 1.7–7)
NEUTROPHILS NFR BLD AUTO: 54.9 % (ref 42.7–76)
NRBC BLD AUTO-RTO: 0 /100 WBC (ref 0–0.2)
PLATELET # BLD AUTO: 120 10*3/MM3 (ref 140–450)
PMV BLD AUTO: 10 FL (ref 6–12)
POTASSIUM BLD-SCNC: 4.7 MMOL/L (ref 3.5–5.2)
PROT SERPL-MCNC: 8.2 G/DL (ref 6–8.5)
RBC # BLD AUTO: 4.75 10*6/MM3 (ref 4.14–5.8)
SODIUM BLD-SCNC: 141 MMOL/L (ref 136–145)
WBC NRBC COR # BLD: 7.38 10*3/MM3 (ref 3.4–10.8)

## 2019-12-13 PROCEDURE — 77412 RADIATION TX DELIVERY LVL 3: CPT | Performed by: RADIOLOGY

## 2019-12-13 PROCEDURE — 99214 OFFICE O/P EST MOD 30 MIN: CPT | Performed by: INTERNAL MEDICINE

## 2019-12-13 PROCEDURE — 85025 COMPLETE CBC W/AUTO DIFF WBC: CPT

## 2019-12-13 PROCEDURE — 80053 COMPREHEN METABOLIC PANEL: CPT

## 2019-12-13 PROCEDURE — 96413 CHEMO IV INFUSION 1 HR: CPT

## 2019-12-13 PROCEDURE — 25010000002 ATEZOLIZUMAB 1200 MG/20ML SOLUTION 20 ML VIAL: Performed by: INTERNAL MEDICINE

## 2019-12-13 PROCEDURE — 36415 COLL VENOUS BLD VENIPUNCTURE: CPT

## 2019-12-13 RX ORDER — SODIUM CHLORIDE 9 MG/ML
250 INJECTION, SOLUTION INTRAVENOUS ONCE
Status: COMPLETED | OUTPATIENT
Start: 2019-12-13 | End: 2019-12-13

## 2019-12-13 RX ORDER — SODIUM CHLORIDE 9 MG/ML
250 INJECTION, SOLUTION INTRAVENOUS ONCE
Status: CANCELLED | OUTPATIENT
Start: 2019-12-13

## 2019-12-13 RX ADMIN — SODIUM CHLORIDE 250 ML: 9 INJECTION, SOLUTION INTRAVENOUS at 11:35

## 2019-12-13 RX ADMIN — ATEZOLIZUMAB 1200 MG: 1200 INJECTION, SOLUTION INTRAVENOUS at 12:05

## 2019-12-13 NOTE — PROGRESS NOTES
[facility]  HEMATOLOGY & ONCOLOGY        Subjective     VISIT DIAGNOSIS:   No diagnosis found.    REASON FOR VISIT:     Chief Complaint   Patient presents with   • Bladder Cancer     Here for tx        HEMATOLOGY / ONCOLOGY HISTORY:      Malignant neoplasm of urinary bladder (CMS/HCC)     Initial Diagnosis     Malignant neoplasm of urinary bladder (CMS/HCC)      8/13/2018 Imaging     CT Abd/Pelvis (Adal interpretation from outside facility):  1.  Multiple foci of extraluminal gas posterior to the cecum   suspected to represent bowel perforation most likely involving the   cecum. Medially the appendix appears unremarkable and is thought   unlikely to represent the source. No definite mass appreciated within   the limitations of unenhanced CT technique; endoscopic follow-up   recommended.  2.  Abdominopelvic free fluid with a large centrally calcified   structure in the pouch of Cyrus. This might potential be related to   the above-described bowel perforation, however this is suspected to   more likely be chronic and of indeterminate etiology. A clear urinary   or biliary source is not identified.  3.  Borderline cirrhotic hepatic configuration. Probable mild varices   adjacent to the distal esophagus and proximal stomach. Nonspecific   fat stranding in the upper abdominal mesentery and retroperitoneum.   Mild gastrohepatic adenopathy, uncertain etiology, possibly reactive;   neoplastic etiology cannot be excluded. Correlation with prior   imaging recommended.  4.  Small hypoattenuating area in the posterior RIGHT hepatic lobe   incompletely characterized ; given the borderline cirrhotic   configuration and nonemergent follow-up hepatic protocol CT or MRI is   recommended for further evaluation. Small renal lesions too small to   characterize but statistically likely a cyst.  5.  Incidental and other findings as described above including right   renal stone, small inguinal hernias, degenerative  disc disease,   atherosclerosis.      8/14/2018 Imaging     CT Abd/Pelvis:  1. Similar in appearance to the prior study, there are findings   suspicious for bowel perforation, with extraluminal gas seen adjacent   to the cecum and terminal ileum. This was subsequently evaluated at   the operating room.  2. Small volume of free fluid in the abdomen and pelvis. There is   also a rounded radiodensity free-floating within ascitic fluid in the   pelvis, which is of on certain etiology.. At the time of dictation,   this had been removed at laparoscopy.  3. There are two enhancing lesions arising from the urinary bladder   wall, suspicious for bladder neoplasms. These were subsequently   evaluated at cystoscopy.  4. Morphologic features of cirrhosis. 2.1 cm lesion in the right lobe   of the liver, not definitively characterized on this single phase   study, but suspicious for hepatocellular carcinoma. Recommend further   evaluation with dedicated liver protocol CT or MRI as well as   correlation with serum alpha-fetoprotein levels.  5. Additional findings as above.      8/14/2018 Biopsy     Diagnosis  1)  URINARY BLADDER, LEFT, TRANSURETHRAL RESECTION: NONINVASIVE HIGH GRADE PAPILLARY UROTHELIAL CARCINOMA; NO MUSCULARIS PROPRIA PRESENT.      2)  URINARY BLADER, RIGHT WALL, TRANSURETHRAL RESECTION: NONINVASIVE HIGH GRADE PAPILLARY UROTHELIAL CARCINOMA WITH EXTENSIVE CAUTERY ARTIFACT; NO MUSCULARIS PROPRIA PRESENT.         9/28/2018 Biopsy     TURBT - BHP:  Final Diagnosis   1.  Urinary bladder, lateral tumor, transurethral resection:  Noninvasive, high-grade papillary urothelial carcinoma  Acute and chronic inflammation  Muscularis propria is present and negative     2.  Urinary bladder, posterior tumor, transurethral resection:  Noninvasive, high-grade papillary urothelial carcinoma  Chronic inflammation  Muscularis propria is not identified     3.  Urinary bladder, right lateral tumor, transurethral resection:   Severe  cautery artifact  Intact urothelium is not visualized        AJCC cancer stage: pTa           1/21/2019 Biopsy     TURBT - BHP:  Final Diagnosis   1.  Bladder, posterior tumor #1, transurethral resection:  Noninvasive high-grade papillary urothelial carcinoma, pTa.  Detrusor muscle is not present for evaluation     2.  Bladder, area of right ureteral orifice, biopsy:  Urothelial carcinoma in situ, pTis.     3.  Bladder, posterior tumor #2, transurethral resection:  Noninvasive high-grade papillary urothelial carcinoma, pTa.  Detrusor muscle is not present for evaluation           4/18/2019 Imaging     CT Abd/Pelvis (Adal interpretation from outside facility)  1. Redemonstration of a treated lesion in segment VII of the right   hepatic lobe, without evidence of arterial enhancement or washout,   compatible with a LR-TR nonviable.  2. No new liver lesion is identified. Portal vein is patent, without   evidence of thrombus.  3. Cirrhosis, with evidence of portal hypertension      5/20/2019 Biopsy     TURBT - BHP:  Final Diagnosis   1.  Urinary bladder, left wall, biopsy:  A.  Invasive urothelial carcinoma with nested features invading the lamina propria.  B.  No muscularis propria smooth muscle present for evaluation.     2.  Urinary bladder, posterior wall, biopsy:  A.  Invasive urothelial carcinoma with nested features invading the lamina propria.  B.  No muscularis propria smooth muscle present for evaluation.     3.  Urinary bladder, right wall, biopsy: Invasive papillary urothelial carcinoma, high-grade.     Comment: The tumor invades the muscularis propria.  Focally, invasive carcinoma is seen involving thin smooth muscle bundles.  It is difficult to distinguish whether these muscle bundles represent muscularis propria that has been partially destroyed by the invasive cancer or muscularis mucosa from the lamina propria.  Additional tissue sampling is recommended.     An extra departmental consultation was  obtained from Dr. Jhonatan Parson at [facility].  His diagnostic impressions are reflected in the above diagnoses.  Please refer to the complete pathology report from Dr. Parson which is appended.           7/24/2019 Imaging     CT Chest/Abd/Pelvis:  1. Post procedural changes related to prior TACE in segment seven of   the right hepatic lobe. There is a nodular focus of arterial   enhancement at the posterior margin of the treated lesion, compatible   with LR-TR viable. Please note, on the pretreatment, outside MRI from   9/14/2018, this treated lesion had rim-like arterial phase   hyperenhancement, and would have been best characterized as LI-RADS -   M.  2. 1.2 cm soft tissue nodule along the right posterior bladder wall,   decreased in size since 8/14/2018, correlating with one of patient's   known bladder neoplasms. A second previously seen lesion along the   left posterior bladder wall is no longer clearly definable.  3. No findings to suggest metastatic disease in the abdomen or pelvis.  4. Small, nonspecific noncalcified nodule in the right upper lobe.   Recommend attention on follow-up.  5. Numerous additional findings as above.      7/24/2019 Biopsy     TUBRT - Adal:  Diagnosis    1. URINARY BLADDER, RIGHT LATERAL WALL, TRANSURETHRAL RESECTION:  - INVASIVE HIGH GRADE UROTHELIAL CARCINOMA.  - TUMOR INVADES MUSCULARIS PROPRIA.    2. URINARY BLADDER, BASE, TRANSURETHRAL RESECTION:  - UROTHELIAL CARCINOMA IN SITU.  - MUSCULARIS PROPRIA IS PRESENT.        10/1/2019 Imaging     CT Abd/Pelvis:  Impression:  1.  No signs of residual disease, LR TR nonviable.  2.  No new lesions.  3.  Signs of portal hypertension as before.          11/6/2019 Imaging     Ct Chest With Contrast  Result Date: 11/6/2019  1. No evidence of intrathoracic metastatic disease is identified. Moderate emphysema is present. There are a few focal areas of interstitial scarring in the lungs. There is a 3 mm noncalcified  subpleural nodule in the right upper lobe which appears benign. 2. Heavy coronary artery calcifications are present. 3. Mild thickening of the wall of the distal esophagus which may be related to inflammation from reflux disease. Clinical correlation is recommended. 4. Small 1 cm cyst at the upper pole of the left kidney.       Ct Abdomen Pelvis With Contrast  Result Date: 11/6/2019  1. No evidence of intra-abdominal pelvic metastatic disease. There were 3 small liver masses seen on previous MRI, the to similar mass seen in segment 8 has regressed and appears calcified, compatible with the history of interventional chemoembolization. The other 2 liver lesions are not visualized. 2. Poor distention of the bladder with mild bladder wall thickening and no discrete bladder mass. 3. Mild sigmoid diverticulosis. 4. Swirling of the central mesentery compatible with mild mesenteric volvulus, without complication. 5. Heavy aortic atherosclerotic calcification and calcification within the proximal SMA, with no signs of mesenteric ischemia.         11/15/2019 Surgery     Surgery       Procedure: TURBT        Final Diagnosis   1.  Posterior wall bladder tumor, TUR:  High-grade urothelial carcinoma, see comment   2.  Right lateral wall bladder tumor, TUR:  High-grade urothelial carcinoma, see comment   3.  Dome bladder tumor, TUR:  Negative for tumor                     Chemotherapy Treatment History     Treatment Plan: OP BLADDER Atezolizumab    Medications: Atezolizumab (TECENTRIQ) 1,200 mg in sodium chloride 0.9 % 270 mL chemo IVPB, 1,200 mg, 1 of 6 cycles      Reason treatment was stopped:  [Plan is still active]     Cancer Staging Information:  Cancer Staging  No matching staging information was found for the patient.      INTERVAL HISTORY  Patient ID: Elder Mina is a 69 y.o. year old male with medical hx sig for HCC s/p ablation, now with recurrent bladder cancer suspected to be T2 muscle invasive. TURBT planned  coming week. He has been determined not to be a surgical candidate by urologist at [facility]. He saw rad/onc and he is a candidate for radiation. He is being seen in consideration for systemic chemotherapy.    11/22/19: s/p port placement. He will be seeing rad/onc for consultation today  -Currently have no complain. He had bladder polyp taken out yesterday and is having hematuria. This is expected for up to a week  --denies dysuria, sob, cp, n/v, abdominal pain, fever, chills, dizziness. Ros unremarkable. PE reveals non focal.    PE remains the same    Review of Systems     See above. Otherwise negative.    Medications:    Current Outpatient Medications   Medication Sig Dispense Refill   • ALPRAZolam (XANAX) 1 MG tablet Take 1 mg by mouth 2 (Two) Times a Day As Needed for Anxiety.     • budesonide-formoterol (SYMBICORT) 160-4.5 MCG/ACT inhaler Inhale 2 puffs Daily.     • ipratropium-albuterol (COMBIVENT RESPIMAT)  MCG/ACT inhaler Inhale 1 puff 4 (Four) Times a Day As Needed for Wheezing.     • O2 (OXYGEN) Inhale 2 L/min Take As Directed. nightly and when short of air     • oxybutynin (DITROPAN) 5 MG tablet Take 1 tablet by mouth Every 8 (Eight) Hours As Needed (prn bladder spasms). 30 tablet 1   • phenazopyridine (PYRIDIUM) 100 MG tablet Take 1 tablet by mouth 3 (Three) Times a Day As Needed (urinary burning). 20 tablet 0   • traMADol (ULTRAM) 50 MG tablet Take 1 tablet by mouth Every 6 (Six) Hours As Needed for Moderate Pain  (POSTOP PAIN). 8 tablet 0     No current facility-administered medications for this visit.        ALLERGIES:    Allergies   Allergen Reactions   • Penicillins Rash       Objective      Vitals:    12/13/19 1017   BP: 118/56   Pulse: 76   Resp: 16   Temp: 98.3 °F (36.8 °C)   SpO2: 91%       Current Status 12/13/2019   ECOG score 1       General Appearance: Patient is awake, alert, oriented and in no acute distress. Patient is welldeveloped, wellnourished, and appears stated age.  HEENT:  Normocephalic. Sclerae clear, conjunctiva pink, extraocular movements intact, pupils, round, reactive to light and  accommodation. Mouth and throat are clear with moist oral mucosa.  NECK: Supple, no jugular venous distention, thyroid not enlarged.  LYMPH: No cervical, supraclavicular, axillary, or inguinal lymphadenopathy.  CHEST: Equal bilateral expansion, AP  diameter normal, resonant percussion note  LUNGS: Good air movement, no rales, rhonchi, rubs or wheezes with auscultation  CARDIO: Regular sinus rhythm, no murmurs, gallops or rubs.  ABDOMEN: Nondistended, soft, No tenderness, no guarding, no rebound, No hepatosplenomegaly. No abdominal masses. Bowel sounds positive. No hernia  GENITALIA: Not examined.  BREASTS: Not examined.  MUSKEL: No joint swelling, decreased motion, or inflammation  EXTREMS: No edema, clubbing, cyanosis, No varicose veins.  NEURO: Grossly nonfocal, Gait is coordinated and smooth, Cognition is preserved.  SKIN: No rashes, no ecchymoses, no petechia.  PSYCH: Oriented to time, place and person. Memory is preserved. Mood and affect appear normal      RECENT LABS:  No visits with results within 7 Day(s) from this visit.   Latest known visit with results is:   Lab on 11/22/2019   Component Date Value Ref Range Status   • Glucose 11/22/2019 90  65 - 99 mg/dL Final   • BUN 11/22/2019 20  8 - 23 mg/dL Final   • Creatinine 11/22/2019 0.75* 0.76 - 1.27 mg/dL Final   • Sodium 11/22/2019 142  136 - 145 mmol/L Final   • Potassium 11/22/2019 4.2  3.5 - 5.2 mmol/L Final   • Chloride 11/22/2019 101  98 - 107 mmol/L Final   • CO2 11/22/2019 34.0* 22.0 - 29.0 mmol/L Final   • Calcium 11/22/2019 9.9  8.6 - 10.5 mg/dL Final   • Total Protein 11/22/2019 7.6  6.0 - 8.5 g/dL Final   • Albumin 11/22/2019 4.10  3.50 - 5.20 g/dL Final   • ALT (SGPT) 11/22/2019 26  1 - 41 U/L Final   • AST (SGOT) 11/22/2019 26  1 - 40 U/L Final   • Alkaline Phosphatase 11/22/2019 116  39 - 117 U/L Final   • Total Bilirubin  11/22/2019 0.6  0.2 - 1.2 mg/dL Final   • eGFR Non African Amer 11/22/2019 103  >60 mL/min/1.73 Final   • Globulin 11/22/2019 3.5  gm/dL Final   • A/G Ratio 11/22/2019 1.2  g/dL Final   • BUN/Creatinine Ratio 11/22/2019 26.7* 7.0 - 25.0 Final   • Anion Gap 11/22/2019 7.0  5.0 - 15.0 mmol/L Final   • TSH 11/22/2019 2.100  0.270 - 4.200 uIU/mL Final   • T3, Free 11/22/2019 3.46  2.00 - 4.40 pg/mL Final   • Free T4 11/22/2019 1.38  0.93 - 1.70 ng/dL Final   • WBC 11/22/2019 8.58  3.40 - 10.80 10*3/mm3 Final   • RBC 11/22/2019 4.65  4.14 - 5.80 10*6/mm3 Final   • Hemoglobin 11/22/2019 15.5  13.0 - 17.7 g/dL Final   • Hematocrit 11/22/2019 46.2  37.5 - 51.0 % Final   • MCV 11/22/2019 99.4* 79.0 - 97.0 fL Final   • MCH 11/22/2019 33.3* 26.6 - 33.0 pg Final   • MCHC 11/22/2019 33.5  31.5 - 35.7 g/dL Final   • RDW 11/22/2019 13.8  12.3 - 15.4 % Final   • RDW-SD 11/22/2019 50.7  37.0 - 54.0 fl Final   • MPV 11/22/2019 10.4  6.0 - 12.0 fL Final   • Platelets 11/22/2019 102* 140 - 450 10*3/mm3 Final   • Neutrophil % 11/22/2019 56.9  42.7 - 76.0 % Final   • Lymphocyte % 11/22/2019 24.4  19.6 - 45.3 % Final   • Monocyte % 11/22/2019 13.2* 5.0 - 12.0 % Final   • Eosinophil % 11/22/2019 4.7  0.3 - 6.2 % Final   • Basophil % 11/22/2019 0.7  0.0 - 1.5 % Final   • Immature Grans % 11/22/2019 0.1  0.0 - 0.5 % Final   • Neutrophils, Absolute 11/22/2019 4.84  1.70 - 7.00 10*3/mm3 Final   • Lymphocytes, Absolute 11/22/2019 2.07  0.70 - 3.10 10*3/mm3 Final   • Monocytes, Absolute 11/22/2019 1.12* 0.10 - 0.90 10*3/mm3 Final   • Eosinophils, Absolute 11/22/2019 0.40  0.00 - 0.40 10*3/mm3 Final   • Basophils, Absolute 11/22/2019 0.06  0.00 - 0.20 10*3/mm3 Final   • Immature Grans, Absolute 11/22/2019 0.01  0.00 - 0.05 10*3/mm3 Final   • nRBC 11/22/2019 0.0  0.0 - 0.2 /100 WBC Final   • Extra Tube 11/22/2019 Hold for add-ons.   Final    Auto resulted.       RADIOLOGY:  No results found.         Assessment/Plan 70 yo with HCC s/p ablation ,  with recurrent T2 bladder cancer, here to initiate tecentriq    1. MIBC: TURBT planned for Nov 12th.   -ct c/a/p for staging  -pt already saw rad/onc. 7 weeks of xrt planned.  -given his overall comorbidity ie liver cirrhosis with HCC, not platinum eligible. Also not a surgical candidate for cycstectomy per urology.  -plan for atezolizumab plus cc-XRT  -pros and cons of immunotherapy discussed with pt and wife. I gave them literature on atezolizumab  -They will be seen for chemo teaching with ALi APRN  -pt advised not to start xrt without starting systemic therapy  -they were given the opportunity to ask questions which I answered to my best ability and they appeared to understand.      11/22/19: CT c/a/p 11/6/19 with no evidence of metastatic dz    12./13/19: labs reviewed with pt and wife wbc 7.38, Hg 16, plt 120.  lft nl, cr 0.69  -ok for tecentriq    2. HCC s/p ablation at [facility]    3. Anxiety: on xanax rosalind    4.COPD: on symbicort and combivent     Obiageli Ewa Resendiz MD    12/13/2019    10:25 AM

## 2019-12-16 ENCOUNTER — HOSPITAL ENCOUNTER (OUTPATIENT)
Dept: RADIATION ONCOLOGY | Facility: HOSPITAL | Age: 69
Setting detail: RADIATION/ONCOLOGY SERIES
Discharge: HOME | End: 2019-12-16

## 2019-12-16 PROCEDURE — 77412 RADIATION TX DELIVERY LVL 3: CPT | Performed by: RADIOLOGY

## 2019-12-17 ENCOUNTER — HOSPITAL ENCOUNTER (OUTPATIENT)
Dept: RADIATION ONCOLOGY | Facility: HOSPITAL | Age: 69
Setting detail: RADIATION/ONCOLOGY SERIES
Discharge: HOME | End: 2019-12-17

## 2019-12-17 PROCEDURE — 77417 THER RADIOLOGY PORT IMAGE(S): CPT | Performed by: RADIOLOGY

## 2019-12-17 PROCEDURE — 77412 RADIATION TX DELIVERY LVL 3: CPT | Performed by: RADIOLOGY

## 2019-12-18 ENCOUNTER — HOSPITAL ENCOUNTER (OUTPATIENT)
Dept: RADIATION ONCOLOGY | Facility: HOSPITAL | Age: 69
Setting detail: RADIATION/ONCOLOGY SERIES
Discharge: HOME | End: 2019-12-18

## 2019-12-18 PROCEDURE — 77412 RADIATION TX DELIVERY LVL 3: CPT | Performed by: RADIOLOGY

## 2019-12-19 ENCOUNTER — HOSPITAL ENCOUNTER (OUTPATIENT)
Dept: RADIATION ONCOLOGY | Facility: HOSPITAL | Age: 69
Setting detail: RADIATION/ONCOLOGY SERIES
Discharge: HOME | End: 2019-12-19

## 2019-12-19 PROCEDURE — 77412 RADIATION TX DELIVERY LVL 3: CPT | Performed by: RADIOLOGY

## 2019-12-19 PROCEDURE — 77336 RADIATION PHYSICS CONSULT: CPT | Performed by: RADIOLOGY

## 2019-12-20 ENCOUNTER — HOSPITAL ENCOUNTER (OUTPATIENT)
Dept: RADIATION ONCOLOGY | Facility: HOSPITAL | Age: 69
Setting detail: RADIATION/ONCOLOGY SERIES
Discharge: HOME | End: 2019-12-20

## 2019-12-20 PROCEDURE — 77412 RADIATION TX DELIVERY LVL 3: CPT | Performed by: RADIOLOGY

## 2019-12-23 ENCOUNTER — HOSPITAL ENCOUNTER (OUTPATIENT)
Dept: RADIATION ONCOLOGY | Facility: HOSPITAL | Age: 69
Setting detail: RADIATION/ONCOLOGY SERIES
Discharge: HOME | End: 2019-12-23

## 2019-12-23 PROCEDURE — 77412 RADIATION TX DELIVERY LVL 3: CPT | Performed by: RADIOLOGY

## 2019-12-24 ENCOUNTER — HOSPITAL ENCOUNTER (OUTPATIENT)
Dept: RADIATION ONCOLOGY | Facility: HOSPITAL | Age: 69
Setting detail: RADIATION/ONCOLOGY SERIES
Discharge: HOME | End: 2019-12-24

## 2019-12-24 PROCEDURE — 77412 RADIATION TX DELIVERY LVL 3: CPT | Performed by: RADIOLOGY

## 2019-12-24 PROCEDURE — 77417 THER RADIOLOGY PORT IMAGE(S): CPT | Performed by: RADIOLOGY

## 2019-12-26 ENCOUNTER — HOSPITAL ENCOUNTER (OUTPATIENT)
Dept: RADIATION ONCOLOGY | Facility: HOSPITAL | Age: 69
Setting detail: RADIATION/ONCOLOGY SERIES
Discharge: HOME | End: 2019-12-26

## 2019-12-26 PROCEDURE — 77412 RADIATION TX DELIVERY LVL 3: CPT | Performed by: RADIOLOGY

## 2019-12-27 ENCOUNTER — HOSPITAL ENCOUNTER (OUTPATIENT)
Dept: RADIATION ONCOLOGY | Facility: HOSPITAL | Age: 69
Setting detail: RADIATION/ONCOLOGY SERIES
Discharge: HOME | End: 2019-12-27

## 2019-12-27 PROCEDURE — 77336 RADIATION PHYSICS CONSULT: CPT | Performed by: RADIOLOGY

## 2019-12-27 PROCEDURE — 77412 RADIATION TX DELIVERY LVL 3: CPT | Performed by: RADIOLOGY

## 2019-12-30 ENCOUNTER — HOSPITAL ENCOUNTER (OUTPATIENT)
Dept: RADIATION ONCOLOGY | Facility: HOSPITAL | Age: 69
Discharge: HOME | End: 2019-12-30

## 2019-12-30 PROCEDURE — 77412 RADIATION TX DELIVERY LVL 3: CPT | Performed by: RADIOLOGY

## 2019-12-31 ENCOUNTER — HOSPITAL ENCOUNTER (OUTPATIENT)
Dept: RADIATION ONCOLOGY | Facility: HOSPITAL | Age: 69
Setting detail: RADIATION/ONCOLOGY SERIES
Discharge: HOME | End: 2019-12-31

## 2019-12-31 PROCEDURE — 77412 RADIATION TX DELIVERY LVL 3: CPT | Performed by: RADIOLOGY

## 2020-01-02 ENCOUNTER — HOSPITAL ENCOUNTER (OUTPATIENT)
Dept: RADIATION ONCOLOGY | Facility: HOSPITAL | Age: 70
Setting detail: RADIATION/ONCOLOGY SERIES
Discharge: HOME | End: 2020-01-02

## 2020-01-02 ENCOUNTER — HOSPITAL ENCOUNTER (OUTPATIENT)
Dept: RADIATION ONCOLOGY | Facility: HOSPITAL | Age: 70
Setting detail: RADIATION/ONCOLOGY SERIES
End: 2020-01-02

## 2020-01-02 PROCEDURE — 77417 THER RADIOLOGY PORT IMAGE(S): CPT | Performed by: RADIOLOGY

## 2020-01-02 PROCEDURE — 77412 RADIATION TX DELIVERY LVL 3: CPT | Performed by: RADIOLOGY

## 2020-01-03 ENCOUNTER — OFFICE VISIT (OUTPATIENT)
Dept: ONCOLOGY | Facility: CLINIC | Age: 70
End: 2020-01-03

## 2020-01-03 ENCOUNTER — LAB (OUTPATIENT)
Dept: LAB | Facility: HOSPITAL | Age: 70
End: 2020-01-03

## 2020-01-03 ENCOUNTER — HOSPITAL ENCOUNTER (OUTPATIENT)
Dept: RADIATION ONCOLOGY | Facility: HOSPITAL | Age: 70
Setting detail: RADIATION/ONCOLOGY SERIES
Discharge: HOME | End: 2020-01-03

## 2020-01-03 ENCOUNTER — INFUSION (OUTPATIENT)
Dept: ONCOLOGY | Facility: HOSPITAL | Age: 70
End: 2020-01-03

## 2020-01-03 VITALS
HEART RATE: 88 BPM | HEIGHT: 70 IN | RESPIRATION RATE: 16 BRPM | SYSTOLIC BLOOD PRESSURE: 142 MMHG | BODY MASS INDEX: 23.48 KG/M2 | WEIGHT: 164 LBS | TEMPERATURE: 98.4 F | OXYGEN SATURATION: 90 % | DIASTOLIC BLOOD PRESSURE: 66 MMHG

## 2020-01-03 VITALS
HEIGHT: 69 IN | SYSTOLIC BLOOD PRESSURE: 105 MMHG | BODY MASS INDEX: 23.4 KG/M2 | HEART RATE: 78 BPM | WEIGHT: 158 LBS | DIASTOLIC BLOOD PRESSURE: 59 MMHG | TEMPERATURE: 98.1 F | RESPIRATION RATE: 18 BRPM | OXYGEN SATURATION: 96 %

## 2020-01-03 DIAGNOSIS — C67.9 MALIGNANT NEOPLASM OF URINARY BLADDER, UNSPECIFIED SITE (HCC): ICD-10-CM

## 2020-01-03 DIAGNOSIS — G45.3 AMAUROSIS FUGAX OF LEFT EYE: Primary | ICD-10-CM

## 2020-01-03 DIAGNOSIS — C67.8 MALIGNANT NEOPLASM OF OVERLAPPING SITES OF BLADDER (HCC): ICD-10-CM

## 2020-01-03 DIAGNOSIS — Z79.899 ON ANTINEOPLASTIC CHEMOTHERAPY: Primary | ICD-10-CM

## 2020-01-03 DIAGNOSIS — Z79.899 ON ANTINEOPLASTIC CHEMOTHERAPY: ICD-10-CM

## 2020-01-03 LAB
ALBUMIN SERPL-MCNC: 4.1 G/DL (ref 3.5–5.2)
ALBUMIN/GLOB SERPL: 1.1 G/DL
ALP SERPL-CCNC: 131 U/L (ref 39–117)
ALT SERPL W P-5'-P-CCNC: 23 U/L (ref 1–41)
ANION GAP SERPL CALCULATED.3IONS-SCNC: 9 MMOL/L (ref 5–15)
AST SERPL-CCNC: 33 U/L (ref 1–40)
BASOPHILS # BLD AUTO: 0.03 10*3/MM3 (ref 0–0.2)
BASOPHILS NFR BLD AUTO: 0.5 % (ref 0–1.5)
BILIRUB SERPL-MCNC: 0.5 MG/DL (ref 0.2–1.2)
BUN BLD-MCNC: 17 MG/DL (ref 8–23)
BUN/CREAT SERPL: 22.1 (ref 7–25)
CALCIUM SPEC-SCNC: 9.4 MG/DL (ref 8.6–10.5)
CHLORIDE SERPL-SCNC: 99 MMOL/L (ref 98–107)
CO2 SERPL-SCNC: 32 MMOL/L (ref 22–29)
CREAT BLD-MCNC: 0.77 MG/DL (ref 0.76–1.27)
DEPRECATED RDW RBC AUTO: 52.4 FL (ref 37–54)
EOSINOPHIL # BLD AUTO: 0.3 10*3/MM3 (ref 0–0.4)
EOSINOPHIL NFR BLD AUTO: 4.7 % (ref 0.3–6.2)
ERYTHROCYTE [DISTWIDTH] IN BLOOD BY AUTOMATED COUNT: 14.4 % (ref 12.3–15.4)
GFR SERPL CREATININE-BSD FRML MDRD: 100 ML/MIN/1.73
GLOBULIN UR ELPH-MCNC: 3.8 GM/DL
GLUCOSE BLD-MCNC: 124 MG/DL (ref 65–99)
HCT VFR BLD AUTO: 47.1 % (ref 37.5–51)
HGB BLD-MCNC: 15.9 G/DL (ref 13–17.7)
HOLD SPECIMEN: NORMAL
HOLD SPECIMEN: NORMAL
LYMPHOCYTES # BLD AUTO: 1.31 10*3/MM3 (ref 0.7–3.1)
LYMPHOCYTES NFR BLD AUTO: 20.5 % (ref 19.6–45.3)
MCH RBC QN AUTO: 33.5 PG (ref 26.6–33)
MCHC RBC AUTO-ENTMCNC: 33.8 G/DL (ref 31.5–35.7)
MCV RBC AUTO: 99.4 FL (ref 79–97)
MONOCYTES # BLD AUTO: 0.8 10*3/MM3 (ref 0.1–0.9)
MONOCYTES NFR BLD AUTO: 12.5 % (ref 5–12)
NEUTROPHILS # BLD AUTO: 3.94 10*3/MM3 (ref 1.7–7)
NEUTROPHILS NFR BLD AUTO: 61.5 % (ref 42.7–76)
PLATELET # BLD AUTO: 100 10*3/MM3 (ref 140–450)
PMV BLD AUTO: 10.2 FL (ref 6–12)
POTASSIUM BLD-SCNC: 4.1 MMOL/L (ref 3.5–5.2)
PROT SERPL-MCNC: 7.9 G/DL (ref 6–8.5)
RBC # BLD AUTO: 4.74 10*6/MM3 (ref 4.14–5.8)
SODIUM BLD-SCNC: 140 MMOL/L (ref 136–145)
WBC NRBC COR # BLD: 6.4 10*3/MM3 (ref 3.4–10.8)

## 2020-01-03 PROCEDURE — 36415 COLL VENOUS BLD VENIPUNCTURE: CPT

## 2020-01-03 PROCEDURE — 80053 COMPREHEN METABOLIC PANEL: CPT

## 2020-01-03 PROCEDURE — 99215 OFFICE O/P EST HI 40 MIN: CPT | Performed by: INTERNAL MEDICINE

## 2020-01-03 PROCEDURE — 85025 COMPLETE CBC W/AUTO DIFF WBC: CPT

## 2020-01-03 PROCEDURE — 25010000002 ATEZOLIZUMAB 1200 MG/20ML SOLUTION 20 ML VIAL: Performed by: INTERNAL MEDICINE

## 2020-01-03 PROCEDURE — 77412 RADIATION TX DELIVERY LVL 3: CPT | Performed by: RADIOLOGY

## 2020-01-03 PROCEDURE — 96413 CHEMO IV INFUSION 1 HR: CPT

## 2020-01-03 RX ORDER — SODIUM CHLORIDE 9 MG/ML
250 INJECTION, SOLUTION INTRAVENOUS ONCE
Status: COMPLETED | OUTPATIENT
Start: 2020-01-03 | End: 2020-01-03

## 2020-01-03 RX ORDER — SODIUM CHLORIDE 9 MG/ML
250 INJECTION, SOLUTION INTRAVENOUS ONCE
Status: CANCELLED | OUTPATIENT
Start: 2020-01-03

## 2020-01-03 RX ADMIN — SODIUM CHLORIDE 250 ML: 9 INJECTION, SOLUTION INTRAVENOUS at 11:21

## 2020-01-03 RX ADMIN — ATEZOLIZUMAB 1200 MG: 1200 INJECTION, SOLUTION INTRAVENOUS at 11:30

## 2020-01-03 NOTE — PROGRESS NOTES
[facility]  HEMATOLOGY & ONCOLOGY        Subjective     VISIT DIAGNOSIS:   Encounter Diagnoses   Name Primary?   • Amaurosis fugax of left eye Yes   • Malignant neoplasm of overlapping sites of bladder (CMS/HCC)    • On antineoplastic chemotherapy    • Malignant neoplasm of urinary bladder, unspecified site (CMS/HCC)        REASON FOR VISIT:     Chief Complaint   Patient presents with   • Bladder Cancer     Here for treatment    • Loss of Vision     left eye, had an episode where he went blind in left eye this week   • Diarrhea     imodium has helped        HEMATOLOGY / ONCOLOGY HISTORY:      Malignant neoplasm of urinary bladder (CMS/HCC)     Initial Diagnosis     Malignant neoplasm of urinary bladder (CMS/HCC)      8/13/2018 Imaging     CT Abd/Pelvis (Adal interpretation from outside facility):  1.  Multiple foci of extraluminal gas posterior to the cecum   suspected to represent bowel perforation most likely involving the   cecum. Medially the appendix appears unremarkable and is thought   unlikely to represent the source. No definite mass appreciated within   the limitations of unenhanced CT technique; endoscopic follow-up   recommended.  2.  Abdominopelvic free fluid with a large centrally calcified   structure in the pouch of Cyrus. This might potential be related to   the above-described bowel perforation, however this is suspected to   more likely be chronic and of indeterminate etiology. A clear urinary   or biliary source is not identified.  3.  Borderline cirrhotic hepatic configuration. Probable mild varices   adjacent to the distal esophagus and proximal stomach. Nonspecific   fat stranding in the upper abdominal mesentery and retroperitoneum.   Mild gastrohepatic adenopathy, uncertain etiology, possibly reactive;   neoplastic etiology cannot be excluded. Correlation with prior   imaging recommended.  4.  Small hypoattenuating area in the posterior RIGHT hepatic lobe   incompletely  characterized ; given the borderline cirrhotic   configuration and nonemergent follow-up hepatic protocol CT or MRI is   recommended for further evaluation. Small renal lesions too small to   characterize but statistically likely a cyst.  5.  Incidental and other findings as described above including right   renal stone, small inguinal hernias, degenerative disc disease,   atherosclerosis.      8/14/2018 Imaging     CT Abd/Pelvis:  1. Similar in appearance to the prior study, there are findings   suspicious for bowel perforation, with extraluminal gas seen adjacent   to the cecum and terminal ileum. This was subsequently evaluated at   the operating room.  2. Small volume of free fluid in the abdomen and pelvis. There is   also a rounded radiodensity free-floating within ascitic fluid in the   pelvis, which is of on certain etiology.. At the time of dictation,   this had been removed at laparoscopy.  3. There are two enhancing lesions arising from the urinary bladder   wall, suspicious for bladder neoplasms. These were subsequently   evaluated at cystoscopy.  4. Morphologic features of cirrhosis. 2.1 cm lesion in the right lobe   of the liver, not definitively characterized on this single phase   study, but suspicious for hepatocellular carcinoma. Recommend further   evaluation with dedicated liver protocol CT or MRI as well as   correlation with serum alpha-fetoprotein levels.  5. Additional findings as above.      8/14/2018 Biopsy     Diagnosis  1)  URINARY BLADDER, LEFT, TRANSURETHRAL RESECTION: NONINVASIVE HIGH GRADE PAPILLARY UROTHELIAL CARCINOMA; NO MUSCULARIS PROPRIA PRESENT.      2)  URINARY BLADER, RIGHT WALL, TRANSURETHRAL RESECTION: NONINVASIVE HIGH GRADE PAPILLARY UROTHELIAL CARCINOMA WITH EXTENSIVE CAUTERY ARTIFACT; NO MUSCULARIS PROPRIA PRESENT.         9/28/2018 Biopsy     TURBT - BHP:  Final Diagnosis   1.  Urinary bladder, lateral tumor, transurethral resection:  Noninvasive, high-grade papillary  urothelial carcinoma  Acute and chronic inflammation  Muscularis propria is present and negative     2.  Urinary bladder, posterior tumor, transurethral resection:  Noninvasive, high-grade papillary urothelial carcinoma  Chronic inflammation  Muscularis propria is not identified     3.  Urinary bladder, right lateral tumor, transurethral resection:   Severe cautery artifact  Intact urothelium is not visualized        AJCC cancer stage: pTa           1/21/2019 Biopsy     TURBT - BHP:  Final Diagnosis   1.  Bladder, posterior tumor #1, transurethral resection:  Noninvasive high-grade papillary urothelial carcinoma, pTa.  Detrusor muscle is not present for evaluation     2.  Bladder, area of right ureteral orifice, biopsy:  Urothelial carcinoma in situ, pTis.     3.  Bladder, posterior tumor #2, transurethral resection:  Noninvasive high-grade papillary urothelial carcinoma, pTa.  Detrusor muscle is not present for evaluation           4/18/2019 Imaging     CT Abd/Pelvis (Adal interpretation from outside facility)  1. Redemonstration of a treated lesion in segment VII of the right   hepatic lobe, without evidence of arterial enhancement or washout,   compatible with a LR-TR nonviable.  2. No new liver lesion is identified. Portal vein is patent, without   evidence of thrombus.  3. Cirrhosis, with evidence of portal hypertension      5/20/2019 Biopsy     TURBT - BHP:  Final Diagnosis   1.  Urinary bladder, left wall, biopsy:  A.  Invasive urothelial carcinoma with nested features invading the lamina propria.  B.  No muscularis propria smooth muscle present for evaluation.     2.  Urinary bladder, posterior wall, biopsy:  A.  Invasive urothelial carcinoma with nested features invading the lamina propria.  B.  No muscularis propria smooth muscle present for evaluation.     3.  Urinary bladder, right wall, biopsy: Invasive papillary urothelial carcinoma, high-grade.     Comment: The tumor invades the muscularis propria.   Focally, invasive carcinoma is seen involving thin smooth muscle bundles.  It is difficult to distinguish whether these muscle bundles represent muscularis propria that has been partially destroyed by the invasive cancer or muscularis mucosa from the lamina propria.  Additional tissue sampling is recommended.     An extra departmental consultation was obtained from Dr. Jhonatan Parson at [facility].  His diagnostic impressions are reflected in the above diagnoses.  Please refer to the complete pathology report from Dr. Parson which is appended.           7/24/2019 Imaging     CT Chest/Abd/Pelvis:  1. Post procedural changes related to prior TACE in segment seven of   the right hepatic lobe. There is a nodular focus of arterial   enhancement at the posterior margin of the treated lesion, compatible   with LR-TR viable. Please note, on the pretreatment, outside MRI from   9/14/2018, this treated lesion had rim-like arterial phase   hyperenhancement, and would have been best characterized as LI-RADS -   M.  2. 1.2 cm soft tissue nodule along the right posterior bladder wall,   decreased in size since 8/14/2018, correlating with one of patient's   known bladder neoplasms. A second previously seen lesion along the   left posterior bladder wall is no longer clearly definable.  3. No findings to suggest metastatic disease in the abdomen or pelvis.  4. Small, nonspecific noncalcified nodule in the right upper lobe.   Recommend attention on follow-up.  5. Numerous additional findings as above.      7/24/2019 Biopsy     TUBRT - Adal:  Diagnosis    1. URINARY BLADDER, RIGHT LATERAL WALL, TRANSURETHRAL RESECTION:  - INVASIVE HIGH GRADE UROTHELIAL CARCINOMA.  - TUMOR INVADES MUSCULARIS PROPRIA.    2. URINARY BLADDER, BASE, TRANSURETHRAL RESECTION:  - UROTHELIAL CARCINOMA IN SITU.  - MUSCULARIS PROPRIA IS PRESENT.        10/1/2019 Imaging     CT Abd/Pelvis:  Impression:  1.  No signs of residual disease, LR TR  nonviable.  2.  No new lesions.  3.  Signs of portal hypertension as before.          11/6/2019 Imaging     Ct Chest With Contrast  Result Date: 11/6/2019  1. No evidence of intrathoracic metastatic disease is identified. Moderate emphysema is present. There are a few focal areas of interstitial scarring in the lungs. There is a 3 mm noncalcified subpleural nodule in the right upper lobe which appears benign. 2. Heavy coronary artery calcifications are present. 3. Mild thickening of the wall of the distal esophagus which may be related to inflammation from reflux disease. Clinical correlation is recommended. 4. Small 1 cm cyst at the upper pole of the left kidney.       Ct Abdomen Pelvis With Contrast  Result Date: 11/6/2019  1. No evidence of intra-abdominal pelvic metastatic disease. There were 3 small liver masses seen on previous MRI, the to similar mass seen in segment 8 has regressed and appears calcified, compatible with the history of interventional chemoembolization. The other 2 liver lesions are not visualized. 2. Poor distention of the bladder with mild bladder wall thickening and no discrete bladder mass. 3. Mild sigmoid diverticulosis. 4. Swirling of the central mesentery compatible with mild mesenteric volvulus, without complication. 5. Heavy aortic atherosclerotic calcification and calcification within the proximal SMA, with no signs of mesenteric ischemia.         11/15/2019 Surgery     Surgery       Procedure: TURBT        Final Diagnosis   1.  Posterior wall bladder tumor, TUR:  High-grade urothelial carcinoma, see comment   2.  Right lateral wall bladder tumor, TUR:  High-grade urothelial carcinoma, see comment   3.  Dome bladder tumor, TUR:  Negative for tumor                      Cancer of lateral wall of urinary bladder (CMS/HCC)    11/4/2019 Initial Diagnosis     Cancer of lateral wall of urinary bladder (CMS/HCC)       Chemotherapy Treatment History     Treatment Plan: OP BLADDER  Atezolizumab    Medications: Atezolizumab (TECENTRIQ) 1,200 mg in sodium chloride 0.9 % 270 mL chemo IVPB, 1,200 mg, 3 of 6 cycles      Reason treatment was stopped:  [Plan is still active]     Cancer Staging Information:  Cancer Staging  No matching staging information was found for the patient.      INTERVAL HISTORY  Patient ID: Elder Mina is a 69 y.o. year old male with medical hx sig for HCC s/p ablation, now with recurrent bladder cancer suspected to be T2 muscle invasive. TURBT planned coming week. He has been determined not to be a surgical candidate by urologist at [facility]. He saw rad/onc and he is a candidate for radiation. He is being seen in consideration for systemic chemotherapy.    11/22/19: s/p port placement. He will be seeing rad/onc for consultation today  -Currently have no complain. He had bladder polyp taken out yesterday and is having hematuria. This is expected for up to a week    1/3/2020: reporting transient blindness in one eye x1. Currently resolved.  --denies dysuria, sob, cp, n/v, abdominal pain, fever, chills, dizziness. Ros unremarkable. PE reveals non focal.    PE remains the same    Review of Systems     See above. Otherwise negative.    Medications:    Current Outpatient Medications   Medication Sig Dispense Refill   • ALPRAZolam (XANAX) 1 MG tablet Take 1 mg by mouth 2 (Two) Times a Day As Needed for Anxiety.     • budesonide-formoterol (SYMBICORT) 160-4.5 MCG/ACT inhaler Inhale 2 puffs Daily.     • ipratropium-albuterol (COMBIVENT RESPIMAT)  MCG/ACT inhaler Inhale 1 puff 4 (Four) Times a Day As Needed for Wheezing.     • Loperamide HCl (IMODIUM PO) Take  by mouth.     • O2 (OXYGEN) Inhale 2 L/min Take As Directed. nightly and when short of air       No current facility-administered medications for this visit.        ALLERGIES:    Allergies   Allergen Reactions   • Penicillins Rash       Objective      Vitals:    01/03/20 0827   BP: 142/66   Pulse: 88   Resp: 16   Temp:  98.4 °F (36.9 °C)   SpO2: 90%       Current Status 12/13/2019   ECOG score 1       General Appearance: Patient is awake, alert, oriented and in no acute distress. Patient is welldeveloped, wellnourished, and appears stated age.  HEENT: Normocephalic. Sclerae clear, conjunctiva pink, extraocular movements intact, pupils, round, reactive to light and  accommodation. Mouth and throat are clear with moist oral mucosa.  NECK: Supple, no jugular venous distention, thyroid not enlarged.  LYMPH: No cervical, supraclavicular, axillary, or inguinal lymphadenopathy.  CHEST: Equal bilateral expansion, AP  diameter normal, resonant percussion note  LUNGS: Good air movement, no rales, rhonchi, rubs or wheezes with auscultation  CARDIO: Regular sinus rhythm, no murmurs, gallops or rubs.  ABDOMEN: Nondistended, soft, No tenderness, no guarding, no rebound, No hepatosplenomegaly. No abdominal masses. Bowel sounds positive. No hernia  GENITALIA: Not examined.  BREASTS: Not examined.  MUSKEL: No joint swelling, decreased motion, or inflammation  EXTREMS: No edema, clubbing, cyanosis, No varicose veins.  NEURO: Grossly nonfocal, Gait is coordinated and smooth, Cognition is preserved.  SKIN: No rashes, no ecchymoses, no petechia.  PSYCH: Oriented to time, place and person. Memory is preserved. Mood and affect appear normal      RECENT LABS:  Lab on 01/03/2020   Component Date Value Ref Range Status   • Glucose 01/03/2020 124* 65 - 99 mg/dL Final   • BUN 01/03/2020 17  8 - 23 mg/dL Final   • Creatinine 01/03/2020 0.77  0.76 - 1.27 mg/dL Final   • Sodium 01/03/2020 140  136 - 145 mmol/L Final   • Potassium 01/03/2020 4.1  3.5 - 5.2 mmol/L Final   • Chloride 01/03/2020 99  98 - 107 mmol/L Final   • CO2 01/03/2020 32.0* 22.0 - 29.0 mmol/L Final   • Calcium 01/03/2020 9.4  8.6 - 10.5 mg/dL Final   • Total Protein 01/03/2020 7.9  6.0 - 8.5 g/dL Final   • Albumin 01/03/2020 4.10  3.50 - 5.20 g/dL Final   • ALT (SGPT) 01/03/2020 23  1 - 41 U/L  Final   • AST (SGOT) 01/03/2020 33  1 - 40 U/L Final   • Alkaline Phosphatase 01/03/2020 131* 39 - 117 U/L Final   • Total Bilirubin 01/03/2020 0.5  0.2 - 1.2 mg/dL Final   • eGFR Non African Amer 01/03/2020 100  >60 mL/min/1.73 Final   • Globulin 01/03/2020 3.8  gm/dL Final   • A/G Ratio 01/03/2020 1.1  g/dL Final   • BUN/Creatinine Ratio 01/03/2020 22.1  7.0 - 25.0 Final   • Anion Gap 01/03/2020 9.0  5.0 - 15.0 mmol/L Final   • WBC 01/03/2020 6.40  3.40 - 10.80 10*3/mm3 Final   • RBC 01/03/2020 4.74  4.14 - 5.80 10*6/mm3 Final   • Hemoglobin 01/03/2020 15.9  13.0 - 17.7 g/dL Final   • Hematocrit 01/03/2020 47.1  37.5 - 51.0 % Final   • MCV 01/03/2020 99.4* 79.0 - 97.0 fL Final   • MCH 01/03/2020 33.5* 26.6 - 33.0 pg Final   • MCHC 01/03/2020 33.8  31.5 - 35.7 g/dL Final   • RDW 01/03/2020 14.4  12.3 - 15.4 % Final   • RDW-SD 01/03/2020 52.4  37.0 - 54.0 fl Final   • MPV 01/03/2020 10.2  6.0 - 12.0 fL Final   • Platelets 01/03/2020 100* 140 - 450 10*3/mm3 Final   • Neutrophil % 01/03/2020 61.5  42.7 - 76.0 % Final   • Lymphocyte % 01/03/2020 20.5  19.6 - 45.3 % Final   • Monocyte % 01/03/2020 12.5* 5.0 - 12.0 % Final   • Eosinophil % 01/03/2020 4.7  0.3 - 6.2 % Final   • Basophil % 01/03/2020 0.5  0.0 - 1.5 % Final   • Neutrophils, Absolute 01/03/2020 3.94  1.70 - 7.00 10*3/mm3 Final   • Lymphocytes, Absolute 01/03/2020 1.31  0.70 - 3.10 10*3/mm3 Final   • Monocytes, Absolute 01/03/2020 0.80  0.10 - 0.90 10*3/mm3 Final   • Eosinophils, Absolute 01/03/2020 0.30  0.00 - 0.40 10*3/mm3 Final   • Basophils, Absolute 01/03/2020 0.03  0.00 - 0.20 10*3/mm3 Final   • Extra Tube 01/03/2020 Hold for add-ons.   Final    Auto resulted.   • Extra Tube 01/03/2020 Hold for add-ons.   Final    Auto resulted.       RADIOLOGY:  No results found.         Assessment/Plan 68 yo with HCC s/p ablation , with recurrent T2 bladder cancer, here to initiate tecentriq    1. MIBC: TURBT planned for Nov 12th.   -ct c/a/p for staging  -pt already  saw rad/onc. 7 weeks of xrt planned.  -given his overall comorbidity ie liver cirrhosis with HCC, not platinum eligible. Also not a surgical candidate for cycstectomy per urology.  -plan for atezolizumab plus cc-XRT  -pros and cons of immunotherapy discussed with pt and wife. I gave them literature on atezolizumab  -They will be seen for chemo teaching with ALi APRN  -pt advised not to start xrt without starting systemic therapy  -they were given the opportunity to ask questions which I answered to my best ability and they appeared to understand.    11/22/19: CT c/a/p 11/6/19 with no evidence of metastatic dz    1/3/2020: pt currently on Tecentriq plus xrt with good tolerance.   Labs reviewed ok for treatment    2. HCC s/p ablation at [facility]    3. Anxiety: on xanax rosalind    4.COPD: on symbicort and combivent   5. Amaurosis fugax: will get brain MRI to evaluate.    Logan Resendiz MD    1/3/2020    2:42 PM

## 2020-01-06 ENCOUNTER — HOSPITAL ENCOUNTER (OUTPATIENT)
Dept: RADIATION ONCOLOGY | Facility: HOSPITAL | Age: 70
Setting detail: RADIATION/ONCOLOGY SERIES
Discharge: HOME | End: 2020-01-06

## 2020-01-06 PROCEDURE — 77412 RADIATION TX DELIVERY LVL 3: CPT | Performed by: RADIOLOGY

## 2020-01-07 ENCOUNTER — HOSPITAL ENCOUNTER (OUTPATIENT)
Dept: RADIATION ONCOLOGY | Facility: HOSPITAL | Age: 70
Setting detail: RADIATION/ONCOLOGY SERIES
Discharge: HOME | End: 2020-01-07

## 2020-01-07 PROCEDURE — 77412 RADIATION TX DELIVERY LVL 3: CPT | Performed by: RADIOLOGY

## 2020-01-08 ENCOUNTER — HOSPITAL ENCOUNTER (OUTPATIENT)
Dept: RADIATION ONCOLOGY | Facility: HOSPITAL | Age: 70
Setting detail: RADIATION/ONCOLOGY SERIES
Discharge: HOME | End: 2020-01-08

## 2020-01-08 PROCEDURE — 77412 RADIATION TX DELIVERY LVL 3: CPT | Performed by: RADIOLOGY

## 2020-01-08 PROCEDURE — 77336 RADIATION PHYSICS CONSULT: CPT | Performed by: RADIOLOGY

## 2020-01-09 ENCOUNTER — HOSPITAL ENCOUNTER (OUTPATIENT)
Dept: RADIATION ONCOLOGY | Facility: HOSPITAL | Age: 70
Setting detail: RADIATION/ONCOLOGY SERIES
Discharge: HOME | End: 2020-01-09

## 2020-01-09 PROCEDURE — 77412 RADIATION TX DELIVERY LVL 3: CPT | Performed by: RADIOLOGY

## 2020-01-10 ENCOUNTER — HOSPITAL ENCOUNTER (OUTPATIENT)
Dept: RADIATION ONCOLOGY | Facility: HOSPITAL | Age: 70
Setting detail: RADIATION/ONCOLOGY SERIES
Discharge: HOME | End: 2020-01-10

## 2020-01-10 ENCOUNTER — HOSPITAL ENCOUNTER (OUTPATIENT)
Dept: MRI IMAGING | Facility: HOSPITAL | Age: 70
Discharge: HOME | End: 2020-01-10
Admitting: INTERNAL MEDICINE

## 2020-01-10 DIAGNOSIS — C67.8 MALIGNANT NEOPLASM OF OVERLAPPING SITES OF BLADDER (HCC): ICD-10-CM

## 2020-01-10 DIAGNOSIS — G45.3 AMAUROSIS FUGAX OF LEFT EYE: ICD-10-CM

## 2020-01-10 PROCEDURE — 0 GADOBENATE DIMEGLUMINE 529 MG/ML SOLUTION: Performed by: INTERNAL MEDICINE

## 2020-01-10 PROCEDURE — 70553 MRI BRAIN STEM W/O & W/DYE: CPT

## 2020-01-10 PROCEDURE — 77412 RADIATION TX DELIVERY LVL 3: CPT | Performed by: RADIOLOGY

## 2020-01-10 PROCEDURE — A9577 INJ MULTIHANCE: HCPCS | Performed by: INTERNAL MEDICINE

## 2020-01-10 RX ADMIN — GADOBENATE DIMEGLUMINE 13 ML: 529 INJECTION, SOLUTION INTRAVENOUS at 09:20

## 2020-01-13 ENCOUNTER — HOSPITAL ENCOUNTER (OUTPATIENT)
Dept: RADIATION ONCOLOGY | Facility: HOSPITAL | Age: 70
Setting detail: RADIATION/ONCOLOGY SERIES
Discharge: HOME | End: 2020-01-13

## 2020-01-13 PROCEDURE — 77290 THER RAD SIMULAJ FIELD CPLX: CPT | Performed by: RADIOLOGY

## 2020-01-13 PROCEDURE — 77412 RADIATION TX DELIVERY LVL 3: CPT | Performed by: RADIOLOGY

## 2020-01-14 ENCOUNTER — HOSPITAL ENCOUNTER (OUTPATIENT)
Dept: RADIATION ONCOLOGY | Facility: HOSPITAL | Age: 70
Setting detail: RADIATION/ONCOLOGY SERIES
Discharge: HOME | End: 2020-01-14

## 2020-01-14 PROCEDURE — 77412 RADIATION TX DELIVERY LVL 3: CPT | Performed by: RADIOLOGY

## 2020-01-15 ENCOUNTER — HOSPITAL ENCOUNTER (OUTPATIENT)
Dept: RADIATION ONCOLOGY | Facility: HOSPITAL | Age: 70
Setting detail: RADIATION/ONCOLOGY SERIES
Discharge: HOME | End: 2020-01-15

## 2020-01-15 PROCEDURE — 77336 RADIATION PHYSICS CONSULT: CPT | Performed by: RADIOLOGY

## 2020-01-15 PROCEDURE — 77412 RADIATION TX DELIVERY LVL 3: CPT | Performed by: RADIOLOGY

## 2020-01-16 ENCOUNTER — HOSPITAL ENCOUNTER (OUTPATIENT)
Dept: RADIATION ONCOLOGY | Facility: HOSPITAL | Age: 70
Setting detail: RADIATION/ONCOLOGY SERIES
Discharge: HOME | End: 2020-01-16

## 2020-01-16 PROCEDURE — 77417 THER RADIOLOGY PORT IMAGE(S): CPT | Performed by: RADIOLOGY

## 2020-01-16 PROCEDURE — 77412 RADIATION TX DELIVERY LVL 3: CPT | Performed by: RADIOLOGY

## 2020-01-17 ENCOUNTER — HOSPITAL ENCOUNTER (OUTPATIENT)
Dept: RADIATION ONCOLOGY | Facility: HOSPITAL | Age: 70
Setting detail: RADIATION/ONCOLOGY SERIES
Discharge: HOME | End: 2020-01-17

## 2020-01-17 PROCEDURE — 77412 RADIATION TX DELIVERY LVL 3: CPT | Performed by: RADIOLOGY

## 2020-01-20 ENCOUNTER — HOSPITAL ENCOUNTER (OUTPATIENT)
Dept: RADIATION ONCOLOGY | Facility: HOSPITAL | Age: 70
Setting detail: RADIATION/ONCOLOGY SERIES
Discharge: HOME | End: 2020-01-20

## 2020-01-20 PROCEDURE — 77412 RADIATION TX DELIVERY LVL 3: CPT | Performed by: RADIOLOGY

## 2020-01-20 PROCEDURE — 77300 RADIATION THERAPY DOSE PLAN: CPT | Performed by: RADIOLOGY

## 2020-01-20 PROCEDURE — 77295 3-D RADIOTHERAPY PLAN: CPT | Performed by: RADIOLOGY

## 2020-01-20 PROCEDURE — 77334 RADIATION TREATMENT AID(S): CPT | Performed by: RADIOLOGY

## 2020-01-21 ENCOUNTER — HOSPITAL ENCOUNTER (OUTPATIENT)
Dept: RADIATION ONCOLOGY | Facility: HOSPITAL | Age: 70
Setting detail: RADIATION/ONCOLOGY SERIES
Discharge: HOME | End: 2020-01-21

## 2020-01-21 PROCEDURE — 77412 RADIATION TX DELIVERY LVL 3: CPT | Performed by: RADIOLOGY

## 2020-01-22 ENCOUNTER — HOSPITAL ENCOUNTER (OUTPATIENT)
Dept: RADIATION ONCOLOGY | Facility: HOSPITAL | Age: 70
Setting detail: RADIATION/ONCOLOGY SERIES
Discharge: HOME | End: 2020-01-22

## 2020-01-22 PROCEDURE — 77412 RADIATION TX DELIVERY LVL 3: CPT | Performed by: RADIOLOGY

## 2020-01-23 ENCOUNTER — HOSPITAL ENCOUNTER (OUTPATIENT)
Dept: RADIATION ONCOLOGY | Facility: HOSPITAL | Age: 70
Setting detail: RADIATION/ONCOLOGY SERIES
Discharge: HOME | End: 2020-01-23

## 2020-01-23 PROCEDURE — 77336 RADIATION PHYSICS CONSULT: CPT | Performed by: RADIOLOGY

## 2020-01-23 PROCEDURE — 77417 THER RADIOLOGY PORT IMAGE(S): CPT | Performed by: RADIOLOGY

## 2020-01-23 PROCEDURE — 77412 RADIATION TX DELIVERY LVL 3: CPT | Performed by: RADIOLOGY

## 2020-01-24 ENCOUNTER — HOSPITAL ENCOUNTER (OUTPATIENT)
Dept: RADIATION ONCOLOGY | Facility: HOSPITAL | Age: 70
Setting detail: RADIATION/ONCOLOGY SERIES
Discharge: HOME | End: 2020-01-24

## 2020-01-24 ENCOUNTER — LAB (OUTPATIENT)
Dept: LAB | Facility: HOSPITAL | Age: 70
End: 2020-01-24

## 2020-01-24 ENCOUNTER — OFFICE VISIT (OUTPATIENT)
Dept: ONCOLOGY | Facility: CLINIC | Age: 70
End: 2020-01-24

## 2020-01-24 ENCOUNTER — INFUSION (OUTPATIENT)
Dept: ONCOLOGY | Facility: HOSPITAL | Age: 70
End: 2020-01-24

## 2020-01-24 VITALS
SYSTOLIC BLOOD PRESSURE: 120 MMHG | RESPIRATION RATE: 16 BRPM | OXYGEN SATURATION: 90 % | BODY MASS INDEX: 22.75 KG/M2 | WEIGHT: 158.9 LBS | TEMPERATURE: 98.4 F | HEART RATE: 88 BPM | HEIGHT: 70 IN | DIASTOLIC BLOOD PRESSURE: 72 MMHG

## 2020-01-24 VITALS
SYSTOLIC BLOOD PRESSURE: 102 MMHG | TEMPERATURE: 98.2 F | DIASTOLIC BLOOD PRESSURE: 59 MMHG | OXYGEN SATURATION: 87 % | RESPIRATION RATE: 18 BRPM | HEART RATE: 85 BPM

## 2020-01-24 DIAGNOSIS — Z79.899 ON ANTINEOPLASTIC CHEMOTHERAPY: Primary | ICD-10-CM

## 2020-01-24 DIAGNOSIS — C67.9 MALIGNANT NEOPLASM OF URINARY BLADDER, UNSPECIFIED SITE (HCC): ICD-10-CM

## 2020-01-24 DIAGNOSIS — Z79.899 ON ANTINEOPLASTIC CHEMOTHERAPY: ICD-10-CM

## 2020-01-24 LAB
ALBUMIN SERPL-MCNC: 4.1 G/DL (ref 3.5–5.2)
ALBUMIN/GLOB SERPL: 1 G/DL
ALP SERPL-CCNC: 168 U/L (ref 39–117)
ALT SERPL W P-5'-P-CCNC: 28 U/L (ref 1–41)
ANION GAP SERPL CALCULATED.3IONS-SCNC: 10 MMOL/L (ref 5–15)
AST SERPL-CCNC: 30 U/L (ref 1–40)
BASOPHILS # BLD AUTO: 0.04 10*3/MM3 (ref 0–0.2)
BASOPHILS NFR BLD AUTO: 0.6 % (ref 0–1.5)
BILIRUB SERPL-MCNC: 0.5 MG/DL (ref 0.2–1.2)
BUN BLD-MCNC: 16 MG/DL (ref 8–23)
BUN/CREAT SERPL: 21.1 (ref 7–25)
CALCIUM SPEC-SCNC: 9.6 MG/DL (ref 8.6–10.5)
CHLORIDE SERPL-SCNC: 99 MMOL/L (ref 98–107)
CO2 SERPL-SCNC: 32 MMOL/L (ref 22–29)
CREAT BLD-MCNC: 0.76 MG/DL (ref 0.76–1.27)
DEPRECATED RDW RBC AUTO: 54 FL (ref 37–54)
EOSINOPHIL # BLD AUTO: 0.22 10*3/MM3 (ref 0–0.4)
EOSINOPHIL NFR BLD AUTO: 3.5 % (ref 0.3–6.2)
ERYTHROCYTE [DISTWIDTH] IN BLOOD BY AUTOMATED COUNT: 14.3 % (ref 12.3–15.4)
GFR SERPL CREATININE-BSD FRML MDRD: 101 ML/MIN/1.73
GLOBULIN UR ELPH-MCNC: 4 GM/DL
GLUCOSE BLD-MCNC: 122 MG/DL (ref 65–99)
HCT VFR BLD AUTO: 48.1 % (ref 37.5–51)
HGB BLD-MCNC: 16 G/DL (ref 13–17.7)
HOLD SPECIMEN: NORMAL
IMM GRANULOCYTES # BLD AUTO: 0.03 10*3/MM3 (ref 0–0.05)
IMM GRANULOCYTES NFR BLD AUTO: 0.5 % (ref 0–0.5)
LYMPHOCYTES # BLD AUTO: 0.95 10*3/MM3 (ref 0.7–3.1)
LYMPHOCYTES NFR BLD AUTO: 15.1 % (ref 19.6–45.3)
MCH RBC QN AUTO: 33.5 PG (ref 26.6–33)
MCHC RBC AUTO-ENTMCNC: 33.3 G/DL (ref 31.5–35.7)
MCV RBC AUTO: 100.6 FL (ref 79–97)
MONOCYTES # BLD AUTO: 1.03 10*3/MM3 (ref 0.1–0.9)
MONOCYTES NFR BLD AUTO: 16.4 % (ref 5–12)
NEUTROPHILS # BLD AUTO: 4.01 10*3/MM3 (ref 1.7–7)
NEUTROPHILS NFR BLD AUTO: 63.9 % (ref 42.7–76)
NRBC BLD AUTO-RTO: 0 /100 WBC (ref 0–0.2)
PLATELET # BLD AUTO: 97 10*3/MM3 (ref 140–450)
PMV BLD AUTO: 10.5 FL (ref 6–12)
POTASSIUM BLD-SCNC: 4.3 MMOL/L (ref 3.5–5.2)
PROT SERPL-MCNC: 8.1 G/DL (ref 6–8.5)
RBC # BLD AUTO: 4.78 10*6/MM3 (ref 4.14–5.8)
SODIUM BLD-SCNC: 141 MMOL/L (ref 136–145)
T3FREE SERPL-MCNC: 3.42 PG/ML (ref 2–4.4)
T4 FREE SERPL-MCNC: 1.41 NG/DL (ref 0.93–1.7)
TSH SERPL DL<=0.05 MIU/L-ACNC: 1.28 UIU/ML (ref 0.27–4.2)
WBC NRBC COR # BLD: 6.28 10*3/MM3 (ref 3.4–10.8)

## 2020-01-24 PROCEDURE — 84443 ASSAY THYROID STIM HORMONE: CPT

## 2020-01-24 PROCEDURE — 99215 OFFICE O/P EST HI 40 MIN: CPT | Performed by: INTERNAL MEDICINE

## 2020-01-24 PROCEDURE — 25010000002 ATEZOLIZUMAB 1200 MG/20ML SOLUTION 20 ML VIAL: Performed by: INTERNAL MEDICINE

## 2020-01-24 PROCEDURE — 84439 ASSAY OF FREE THYROXINE: CPT

## 2020-01-24 PROCEDURE — 77412 RADIATION TX DELIVERY LVL 3: CPT | Performed by: RADIOLOGY

## 2020-01-24 PROCEDURE — 80053 COMPREHEN METABOLIC PANEL: CPT

## 2020-01-24 PROCEDURE — 36415 COLL VENOUS BLD VENIPUNCTURE: CPT

## 2020-01-24 PROCEDURE — 84481 FREE ASSAY (FT-3): CPT

## 2020-01-24 PROCEDURE — 96413 CHEMO IV INFUSION 1 HR: CPT

## 2020-01-24 PROCEDURE — 85025 COMPLETE CBC W/AUTO DIFF WBC: CPT

## 2020-01-24 RX ORDER — SODIUM CHLORIDE 9 MG/ML
250 INJECTION, SOLUTION INTRAVENOUS ONCE
Status: CANCELLED | OUTPATIENT
Start: 2020-01-24

## 2020-01-24 RX ORDER — SODIUM CHLORIDE 9 MG/ML
250 INJECTION, SOLUTION INTRAVENOUS ONCE
Status: COMPLETED | OUTPATIENT
Start: 2020-01-24 | End: 2020-01-24

## 2020-01-24 RX ADMIN — SODIUM CHLORIDE 250 ML: 9 INJECTION, SOLUTION INTRAVENOUS at 09:20

## 2020-01-24 RX ADMIN — ATEZOLIZUMAB 1200 MG: 1200 INJECTION, SOLUTION INTRAVENOUS at 09:27

## 2020-01-27 ENCOUNTER — HOSPITAL ENCOUNTER (OUTPATIENT)
Dept: RADIATION ONCOLOGY | Facility: HOSPITAL | Age: 70
Setting detail: RADIATION/ONCOLOGY SERIES
Discharge: HOME | End: 2020-01-27

## 2020-01-27 PROCEDURE — 77412 RADIATION TX DELIVERY LVL 3: CPT | Performed by: RADIOLOGY

## 2020-01-28 ENCOUNTER — HOSPITAL ENCOUNTER (OUTPATIENT)
Dept: RADIATION ONCOLOGY | Facility: HOSPITAL | Age: 70
Setting detail: RADIATION/ONCOLOGY SERIES
Discharge: HOME | End: 2020-01-28

## 2020-01-28 PROCEDURE — 77412 RADIATION TX DELIVERY LVL 3: CPT | Performed by: RADIOLOGY

## 2020-01-29 ENCOUNTER — HOSPITAL ENCOUNTER (OUTPATIENT)
Dept: RADIATION ONCOLOGY | Facility: HOSPITAL | Age: 70
Setting detail: RADIATION/ONCOLOGY SERIES
Discharge: HOME | End: 2020-01-29

## 2020-01-29 PROCEDURE — 77336 RADIATION PHYSICS CONSULT: CPT | Performed by: RADIOLOGY

## 2020-01-29 PROCEDURE — 77412 RADIATION TX DELIVERY LVL 3: CPT | Performed by: RADIOLOGY

## 2020-02-14 ENCOUNTER — OFFICE VISIT (OUTPATIENT)
Dept: ONCOLOGY | Facility: CLINIC | Age: 70
End: 2020-02-14

## 2020-02-14 ENCOUNTER — LAB (OUTPATIENT)
Dept: LAB | Facility: HOSPITAL | Age: 70
End: 2020-02-14

## 2020-02-14 ENCOUNTER — INFUSION (OUTPATIENT)
Dept: ONCOLOGY | Facility: HOSPITAL | Age: 70
End: 2020-02-14

## 2020-02-14 VITALS
DIASTOLIC BLOOD PRESSURE: 60 MMHG | BODY MASS INDEX: 22.94 KG/M2 | HEIGHT: 69 IN | TEMPERATURE: 98.3 F | HEART RATE: 96 BPM | OXYGEN SATURATION: 88 % | RESPIRATION RATE: 16 BRPM | SYSTOLIC BLOOD PRESSURE: 116 MMHG | WEIGHT: 154.9 LBS

## 2020-02-14 VITALS
HEART RATE: 105 BPM | RESPIRATION RATE: 16 BRPM | WEIGHT: 153 LBS | DIASTOLIC BLOOD PRESSURE: 71 MMHG | HEIGHT: 68 IN | SYSTOLIC BLOOD PRESSURE: 127 MMHG | OXYGEN SATURATION: 91 % | BODY MASS INDEX: 23.19 KG/M2 | TEMPERATURE: 97.8 F

## 2020-02-14 DIAGNOSIS — C67.9 MALIGNANT NEOPLASM OF URINARY BLADDER, UNSPECIFIED SITE (HCC): Primary | ICD-10-CM

## 2020-02-14 DIAGNOSIS — C67.9 MALIGNANT NEOPLASM OF URINARY BLADDER, UNSPECIFIED SITE (HCC): ICD-10-CM

## 2020-02-14 DIAGNOSIS — Z79.899 ON ANTINEOPLASTIC CHEMOTHERAPY: Primary | ICD-10-CM

## 2020-02-14 DIAGNOSIS — Z79.899 ON ANTINEOPLASTIC CHEMOTHERAPY: ICD-10-CM

## 2020-02-14 LAB
ALBUMIN SERPL-MCNC: 4.2 G/DL (ref 3.5–5.2)
ALBUMIN/GLOB SERPL: 1.1 G/DL
ALP SERPL-CCNC: 144 U/L (ref 39–117)
ALT SERPL W P-5'-P-CCNC: 31 U/L (ref 1–41)
ANION GAP SERPL CALCULATED.3IONS-SCNC: 9 MMOL/L (ref 5–15)
AST SERPL-CCNC: 34 U/L (ref 1–40)
BASOPHILS # BLD AUTO: 0.03 10*3/MM3 (ref 0–0.2)
BASOPHILS NFR BLD AUTO: 0.4 % (ref 0–1.5)
BILIRUB SERPL-MCNC: 0.8 MG/DL (ref 0.2–1.2)
BUN BLD-MCNC: 18 MG/DL (ref 8–23)
BUN/CREAT SERPL: 24.7 (ref 7–25)
CALCIUM SPEC-SCNC: 9.6 MG/DL (ref 8.6–10.5)
CHLORIDE SERPL-SCNC: 96 MMOL/L (ref 98–107)
CO2 SERPL-SCNC: 35 MMOL/L (ref 22–29)
CREAT BLD-MCNC: 0.73 MG/DL (ref 0.76–1.27)
DEPRECATED RDW RBC AUTO: 54.4 FL (ref 37–54)
EOSINOPHIL # BLD AUTO: 0.12 10*3/MM3 (ref 0–0.4)
EOSINOPHIL NFR BLD AUTO: 1.8 % (ref 0.3–6.2)
ERYTHROCYTE [DISTWIDTH] IN BLOOD BY AUTOMATED COUNT: 14.3 % (ref 12.3–15.4)
GFR SERPL CREATININE-BSD FRML MDRD: 106 ML/MIN/1.73
GLOBULIN UR ELPH-MCNC: 4 GM/DL
GLUCOSE BLD-MCNC: 124 MG/DL (ref 65–99)
HCT VFR BLD AUTO: 49.7 % (ref 37.5–51)
HGB BLD-MCNC: 16.3 G/DL (ref 13–17.7)
HOLD SPECIMEN: NORMAL
HOLD SPECIMEN: NORMAL
LYMPHOCYTES # BLD AUTO: 1.23 10*3/MM3 (ref 0.7–3.1)
LYMPHOCYTES NFR BLD AUTO: 18.4 % (ref 19.6–45.3)
MCH RBC QN AUTO: 33.7 PG (ref 26.6–33)
MCHC RBC AUTO-ENTMCNC: 32.8 G/DL (ref 31.5–35.7)
MCV RBC AUTO: 102.7 FL (ref 79–97)
MONOCYTES # BLD AUTO: 1.09 10*3/MM3 (ref 0.1–0.9)
MONOCYTES NFR BLD AUTO: 16.3 % (ref 5–12)
NEUTROPHILS # BLD AUTO: 4.19 10*3/MM3 (ref 1.7–7)
NEUTROPHILS NFR BLD AUTO: 62.8 % (ref 42.7–76)
PLATELET # BLD AUTO: 82 10*3/MM3 (ref 140–450)
PMV BLD AUTO: 10.7 FL (ref 6–12)
POTASSIUM BLD-SCNC: 4.4 MMOL/L (ref 3.5–5.2)
PROT SERPL-MCNC: 8.2 G/DL (ref 6–8.5)
RBC # BLD AUTO: 4.84 10*6/MM3 (ref 4.14–5.8)
SODIUM BLD-SCNC: 140 MMOL/L (ref 136–145)
WBC NRBC COR # BLD: 6.68 10*3/MM3 (ref 3.4–10.8)

## 2020-02-14 PROCEDURE — 80053 COMPREHEN METABOLIC PANEL: CPT

## 2020-02-14 PROCEDURE — 36415 COLL VENOUS BLD VENIPUNCTURE: CPT

## 2020-02-14 PROCEDURE — 99215 OFFICE O/P EST HI 40 MIN: CPT | Performed by: INTERNAL MEDICINE

## 2020-02-14 PROCEDURE — 85025 COMPLETE CBC W/AUTO DIFF WBC: CPT

## 2020-02-14 RX ORDER — SODIUM CHLORIDE 9 MG/ML
250 INJECTION, SOLUTION INTRAVENOUS ONCE
Status: CANCELLED | OUTPATIENT
Start: 2020-02-14

## 2020-02-14 RX ORDER — SODIUM CHLORIDE 9 MG/ML
250 INJECTION, SOLUTION INTRAVENOUS ONCE
Status: DISCONTINUED | OUTPATIENT
Start: 2020-02-14 | End: 2020-02-14 | Stop reason: HOSPADM

## 2020-02-14 RX ORDER — SODIUM CHLORIDE 9 MG/ML
250 INJECTION, SOLUTION INTRAVENOUS ONCE
Status: CANCELLED | OUTPATIENT
Start: 2020-02-17

## 2020-02-14 NOTE — PROGRESS NOTES
[facility]  HEMATOLOGY & ONCOLOGY        Subjective     VISIT DIAGNOSIS:   Encounter Diagnoses   Name Primary?   • On antineoplastic chemotherapy    • Malignant neoplasm of urinary bladder, unspecified site (CMS/HCC)        REASON FOR VISIT:     Chief Complaint   Patient presents with   • Hepatocellular Carcinoma     Here for tx   • Chills        HEMATOLOGY / ONCOLOGY HISTORY:      Malignant neoplasm of urinary bladder (CMS/HCC)     Initial Diagnosis     Malignant neoplasm of urinary bladder (CMS/HCC)      8/13/2018 Imaging     CT Abd/Pelvis (Adal interpretation from outside facility):  1.  Multiple foci of extraluminal gas posterior to the cecum   suspected to represent bowel perforation most likely involving the   cecum. Medially the appendix appears unremarkable and is thought   unlikely to represent the source. No definite mass appreciated within   the limitations of unenhanced CT technique; endoscopic follow-up   recommended.  2.  Abdominopelvic free fluid with a large centrally calcified   structure in the pouch of Cyrus. This might potential be related to   the above-described bowel perforation, however this is suspected to   more likely be chronic and of indeterminate etiology. A clear urinary   or biliary source is not identified.  3.  Borderline cirrhotic hepatic configuration. Probable mild varices   adjacent to the distal esophagus and proximal stomach. Nonspecific   fat stranding in the upper abdominal mesentery and retroperitoneum.   Mild gastrohepatic adenopathy, uncertain etiology, possibly reactive;   neoplastic etiology cannot be excluded. Correlation with prior   imaging recommended.  4.  Small hypoattenuating area in the posterior RIGHT hepatic lobe   incompletely characterized ; given the borderline cirrhotic   configuration and nonemergent follow-up hepatic protocol CT or MRI is   recommended for further evaluation. Small renal lesions too small to   characterize but  statistically likely a cyst.  5.  Incidental and other findings as described above including right   renal stone, small inguinal hernias, degenerative disc disease,   atherosclerosis.      8/14/2018 Imaging     CT Abd/Pelvis:  1. Similar in appearance to the prior study, there are findings   suspicious for bowel perforation, with extraluminal gas seen adjacent   to the cecum and terminal ileum. This was subsequently evaluated at   the operating room.  2. Small volume of free fluid in the abdomen and pelvis. There is   also a rounded radiodensity free-floating within ascitic fluid in the   pelvis, which is of on certain etiology.. At the time of dictation,   this had been removed at laparoscopy.  3. There are two enhancing lesions arising from the urinary bladder   wall, suspicious for bladder neoplasms. These were subsequently   evaluated at cystoscopy.  4. Morphologic features of cirrhosis. 2.1 cm lesion in the right lobe   of the liver, not definitively characterized on this single phase   study, but suspicious for hepatocellular carcinoma. Recommend further   evaluation with dedicated liver protocol CT or MRI as well as   correlation with serum alpha-fetoprotein levels.  5. Additional findings as above.      8/14/2018 Biopsy     Diagnosis  1)  URINARY BLADDER, LEFT, TRANSURETHRAL RESECTION: NONINVASIVE HIGH GRADE PAPILLARY UROTHELIAL CARCINOMA; NO MUSCULARIS PROPRIA PRESENT.      2)  URINARY BLADER, RIGHT WALL, TRANSURETHRAL RESECTION: NONINVASIVE HIGH GRADE PAPILLARY UROTHELIAL CARCINOMA WITH EXTENSIVE CAUTERY ARTIFACT; NO MUSCULARIS PROPRIA PRESENT.         9/28/2018 Biopsy     TURBT - BHP:  Final Diagnosis   1.  Urinary bladder, lateral tumor, transurethral resection:  Noninvasive, high-grade papillary urothelial carcinoma  Acute and chronic inflammation  Muscularis propria is present and negative     2.  Urinary bladder, posterior tumor, transurethral resection:  Noninvasive, high-grade papillary urothelial  carcinoma  Chronic inflammation  Muscularis propria is not identified     3.  Urinary bladder, right lateral tumor, transurethral resection:   Severe cautery artifact  Intact urothelium is not visualized        AJCC cancer stage: pTa           1/21/2019 Biopsy     TURBT - BHP:  Final Diagnosis   1.  Bladder, posterior tumor #1, transurethral resection:  Noninvasive high-grade papillary urothelial carcinoma, pTa.  Detrusor muscle is not present for evaluation     2.  Bladder, area of right ureteral orifice, biopsy:  Urothelial carcinoma in situ, pTis.     3.  Bladder, posterior tumor #2, transurethral resection:  Noninvasive high-grade papillary urothelial carcinoma, pTa.  Detrusor muscle is not present for evaluation           4/18/2019 Imaging     CT Abd/Pelvis (Adal interpretation from outside facility)  1. Redemonstration of a treated lesion in segment VII of the right   hepatic lobe, without evidence of arterial enhancement or washout,   compatible with a LR-TR nonviable.  2. No new liver lesion is identified. Portal vein is patent, without   evidence of thrombus.  3. Cirrhosis, with evidence of portal hypertension      5/20/2019 Biopsy     TURBT - BHP:  Final Diagnosis   1.  Urinary bladder, left wall, biopsy:  A.  Invasive urothelial carcinoma with nested features invading the lamina propria.  B.  No muscularis propria smooth muscle present for evaluation.     2.  Urinary bladder, posterior wall, biopsy:  A.  Invasive urothelial carcinoma with nested features invading the lamina propria.  B.  No muscularis propria smooth muscle present for evaluation.     3.  Urinary bladder, right wall, biopsy: Invasive papillary urothelial carcinoma, high-grade.     Comment: The tumor invades the muscularis propria.  Focally, invasive carcinoma is seen involving thin smooth muscle bundles.  It is difficult to distinguish whether these muscle bundles represent muscularis propria that has been partially destroyed by the  invasive cancer or muscularis mucosa from the lamina propria.  Additional tissue sampling is recommended.     An extra departmental consultation was obtained from Dr. Jhonatan Parson at [facility].  His diagnostic impressions are reflected in the above diagnoses.  Please refer to the complete pathology report from Dr. Parson which is appended.           7/24/2019 Imaging     CT Chest/Abd/Pelvis:  1. Post procedural changes related to prior TACE in segment seven of   the right hepatic lobe. There is a nodular focus of arterial   enhancement at the posterior margin of the treated lesion, compatible   with LR-TR viable. Please note, on the pretreatment, outside MRI from   9/14/2018, this treated lesion had rim-like arterial phase   hyperenhancement, and would have been best characterized as LI-RADS -   M.  2. 1.2 cm soft tissue nodule along the right posterior bladder wall,   decreased in size since 8/14/2018, correlating with one of patient's   known bladder neoplasms. A second previously seen lesion along the   left posterior bladder wall is no longer clearly definable.  3. No findings to suggest metastatic disease in the abdomen or pelvis.  4. Small, nonspecific noncalcified nodule in the right upper lobe.   Recommend attention on follow-up.  5. Numerous additional findings as above.      7/24/2019 Biopsy     TUBRT - Adal:  Diagnosis    1. URINARY BLADDER, RIGHT LATERAL WALL, TRANSURETHRAL RESECTION:  - INVASIVE HIGH GRADE UROTHELIAL CARCINOMA.  - TUMOR INVADES MUSCULARIS PROPRIA.    2. URINARY BLADDER, BASE, TRANSURETHRAL RESECTION:  - UROTHELIAL CARCINOMA IN SITU.  - MUSCULARIS PROPRIA IS PRESENT.        10/1/2019 Imaging     CT Abd/Pelvis:  Impression:  1.  No signs of residual disease, LR TR nonviable.  2.  No new lesions.  3.  Signs of portal hypertension as before.          11/6/2019 Imaging     Ct Chest With Contrast  Result Date: 11/6/2019  1. No evidence of intrathoracic metastatic disease  is identified. Moderate emphysema is present. There are a few focal areas of interstitial scarring in the lungs. There is a 3 mm noncalcified subpleural nodule in the right upper lobe which appears benign. 2. Heavy coronary artery calcifications are present. 3. Mild thickening of the wall of the distal esophagus which may be related to inflammation from reflux disease. Clinical correlation is recommended. 4. Small 1 cm cyst at the upper pole of the left kidney.       Ct Abdomen Pelvis With Contrast  Result Date: 11/6/2019  1. No evidence of intra-abdominal pelvic metastatic disease. There were 3 small liver masses seen on previous MRI, the to similar mass seen in segment 8 has regressed and appears calcified, compatible with the history of interventional chemoembolization. The other 2 liver lesions are not visualized. 2. Poor distention of the bladder with mild bladder wall thickening and no discrete bladder mass. 3. Mild sigmoid diverticulosis. 4. Swirling of the central mesentery compatible with mild mesenteric volvulus, without complication. 5. Heavy aortic atherosclerotic calcification and calcification within the proximal SMA, with no signs of mesenteric ischemia.         11/15/2019 Surgery     Surgery       Procedure: TURBT        Final Diagnosis   1.  Posterior wall bladder tumor, TUR:  High-grade urothelial carcinoma, see comment   2.  Right lateral wall bladder tumor, TUR:  High-grade urothelial carcinoma, see comment   3.  Dome bladder tumor, TUR:  Negative for tumor                      Cancer of lateral wall of urinary bladder (CMS/HCC)    11/4/2019 Initial Diagnosis     Cancer of lateral wall of urinary bladder (CMS/HCC)       Chemotherapy Treatment History     Treatment Plan: OP BLADDER Atezolizumab    Medications: Atezolizumab (TECENTRIQ) 1,200 mg in sodium chloride 0.9 % 270 mL chemo IVPB, 1,200 mg, 4 of 7 cycles      Reason treatment was stopped:  [Plan is still active]     Cancer Staging  Information:  Cancer Staging  No matching staging information was found for the patient.      INTERVAL HISTORY  Patient ID: Elder Mina is a 69 y.o. year old male with medical hx sig for HCC s/p ablation, now with recurrent bladder cancer suspected to be T2 muscle invasive. TURBT planned coming week. He has been determined not to be a surgical candidate by urologist at [facility]. He saw rad/onc and he is a candidate for radiation. He is being seen in consideration for systemic chemotherapy.    11/22/19: s/p port placement. He will be seeing rad/onc for consultation today  -Currently have no complain. He had bladder polyp taken out yesterday and is having hematuria. This is expected for up to a week    1/24/2020: reporting transient visual changes in one eye.MRI negative for any pathology of metastasis or stroke  -will conclude with xrt next wed.  2/14/2020: completed XRT last wednesday. Lower back pain resolved. Urine is dark. He does not drink much water  --denies dysuria, hematuria, sob, cp, n/v, abdominal pain, fever, chills, dizziness. Ros unremarkable. PE  non focal.    PE remains the same    Review of Systems     See above. Otherwise negative.    Medications:    Current Outpatient Medications   Medication Sig Dispense Refill   • ALPRAZolam (XANAX) 1 MG tablet Take 1 mg by mouth 2 (Two) Times a Day As Needed for Anxiety.     • budesonide-formoterol (SYMBICORT) 160-4.5 MCG/ACT inhaler Inhale 2 puffs Daily.     • ipratropium-albuterol (COMBIVENT RESPIMAT)  MCG/ACT inhaler Inhale 1 puff 4 (Four) Times a Day As Needed for Wheezing.     • Loperamide HCl (IMODIUM PO) Take  by mouth.     • O2 (OXYGEN) Inhale 2 L/min Take As Directed. nightly and when short of air       No current facility-administered medications for this visit.        ALLERGIES:    Allergies   Allergen Reactions   • Penicillins Rash       Objective      Vitals:    02/14/20 0843   BP: 116/60   Pulse: 96   Resp: 16   Temp: 98.3 °F (36.8 °C)    SpO2: (!) 88%       Current Status 2/14/2020   ECOG score 1       General Appearance: remains the same. Patient is awake, alert, oriented and in no acute distress. Patient is welldeveloped, wellnourished, and appears stated age.  HEENT: Normocephalic. Sclerae clear, conjunctiva pink, extraocular movements intact, pupils, round, reactive to light and  accommodation. Mouth and throat are clear with moist oral mucosa.  NECK: Supple, no jugular venous distention, thyroid not enlarged.  LYMPH: No cervical, supraclavicular, axillary, or inguinal lymphadenopathy.  CHEST: Equal bilateral expansion, AP  diameter normal, resonant percussion note  LUNGS: Good air movement, no rales, rhonchi, rubs or wheezes with auscultation  CARDIO: Regular sinus rhythm, no murmurs, gallops or rubs.  ABDOMEN: Nondistended, soft, No tenderness, no guarding, no rebound, No hepatosplenomegaly. No abdominal masses. Bowel sounds positive. No hernia  GENITALIA: Not examined.  BREASTS: Not examined.  MUSKEL: No joint swelling, decreased motion, or inflammation  EXTREMS: No edema, clubbing, cyanosis, No varicose veins.  NEURO: Grossly nonfocal, Gait is coordinated and smooth, Cognition is preserved.  SKIN: No rashes, no ecchymoses, no petechia.  PSYCH: Oriented to time, place and person. Memory is preserved. Mood and affect appear normal      RECENT LABS:  Lab on 02/14/2020   Component Date Value Ref Range Status   • Glucose 02/14/2020 124* 65 - 99 mg/dL Final   • BUN 02/14/2020 18  8 - 23 mg/dL Final   • Creatinine 02/14/2020 0.73* 0.76 - 1.27 mg/dL Final   • Sodium 02/14/2020 140  136 - 145 mmol/L Final   • Potassium 02/14/2020 4.4  3.5 - 5.2 mmol/L Final   • Chloride 02/14/2020 96* 98 - 107 mmol/L Final   • CO2 02/14/2020 35.0* 22.0 - 29.0 mmol/L Final   • Calcium 02/14/2020 9.6  8.6 - 10.5 mg/dL Final   • Total Protein 02/14/2020 8.2  6.0 - 8.5 g/dL Final   • Albumin 02/14/2020 4.20  3.50 - 5.20 g/dL Final   • ALT (SGPT) 02/14/2020 31  1 - 41  U/L Final   • AST (SGOT) 02/14/2020 34  1 - 40 U/L Final   • Alkaline Phosphatase 02/14/2020 144* 39 - 117 U/L Final   • Total Bilirubin 02/14/2020 0.8  0.2 - 1.2 mg/dL Final   • eGFR Non African Amer 02/14/2020 106  >60 mL/min/1.73 Final   • Globulin 02/14/2020 4.0  gm/dL Final   • A/G Ratio 02/14/2020 1.1  g/dL Final   • BUN/Creatinine Ratio 02/14/2020 24.7  7.0 - 25.0 Final   • Anion Gap 02/14/2020 9.0  5.0 - 15.0 mmol/L Final   • WBC 02/14/2020 6.68  3.40 - 10.80 10*3/mm3 Final   • RBC 02/14/2020 4.84  4.14 - 5.80 10*6/mm3 Final   • Hemoglobin 02/14/2020 16.3  13.0 - 17.7 g/dL Final   • Hematocrit 02/14/2020 49.7  37.5 - 51.0 % Final   • MCV 02/14/2020 102.7* 79.0 - 97.0 fL Final   • MCH 02/14/2020 33.7* 26.6 - 33.0 pg Final   • MCHC 02/14/2020 32.8  31.5 - 35.7 g/dL Final   • RDW 02/14/2020 14.3  12.3 - 15.4 % Final   • RDW-SD 02/14/2020 54.4* 37.0 - 54.0 fl Final   • MPV 02/14/2020 10.7  6.0 - 12.0 fL Final   • Platelets 02/14/2020 82* 140 - 450 10*3/mm3 Final   • Neutrophil % 02/14/2020 62.8  42.7 - 76.0 % Final   • Lymphocyte % 02/14/2020 18.4* 19.6 - 45.3 % Final   • Monocyte % 02/14/2020 16.3* 5.0 - 12.0 % Final   • Eosinophil % 02/14/2020 1.8  0.3 - 6.2 % Final   • Basophil % 02/14/2020 0.4  0.0 - 1.5 % Final   • Neutrophils, Absolute 02/14/2020 4.19  1.70 - 7.00 10*3/mm3 Final   • Lymphocytes, Absolute 02/14/2020 1.23  0.70 - 3.10 10*3/mm3 Final   • Monocytes, Absolute 02/14/2020 1.09* 0.10 - 0.90 10*3/mm3 Final   • Eosinophils, Absolute 02/14/2020 0.12  0.00 - 0.40 10*3/mm3 Final   • Basophils, Absolute 02/14/2020 0.03  0.00 - 0.20 10*3/mm3 Final       RADIOLOGY:  No results found.         Assessment/Plan 68 yo with HCC s/p ablation , with recurrent T2 bladder cancer, here for toxicity assessment and  tecentriq with good tolerance    1. MIBC: TURBT planned for Nov 12th.   -ct c/a/p for staging  -pt already saw rad/onc. 7 weeks of xrt planned.  -given his overall comorbidity ie liver cirrhosis with HCC,  not platinum eligible. Also not a surgical candidate for cycstectomy per urology.  -plan for atezolizumab plus cc-XRT  -pros and cons of immunotherapy discussed with pt and wife. I gave them literature on atezolizumab  -They will be seen for chemo teaching with [facility]i APRN  -pt advised not to start xrt without starting systemic therapy  -they were given the opportunity to ask questions which I answered to my best ability and they appeared to understand.    11/22/19: CT c/a/p 11/6/19 with no evidence of metastatic dz    2/14/2020: pt currently on Tecentriq plus xrt with good tolerance.   Labs reviewed ok for treatment.  -wbc 6.28, Hg 16.0, plt 97, cr 0.76, alk phos 168 and stable.    -Ok for tx today    2. HCC s/p ablation at [facility]    3. Anxiety: on xanax rosalind    4.COPD: on symbicort and combivent   5. Amaurosis fugax: -brain MRI due to complaint of left eye blurry vision unremarkable for metastasis. He does have age related atrophy and chronic sinusitis in the right maxillary, ethmoid and bilateral frontal sinusitis. Also he has a h/o cataract s/p sx in the right eye. Suspect this visual changes could be related to his cataract.      Logan Resendiz MD    2/14/2020    9:09 AM

## 2020-02-14 NOTE — PROGRESS NOTES
12:46  The patient asking to leave area due to lack of chemo drug.  Laverne Wheat notified, who notified pharmacist.  Called Dr. Resendiz/Tiffany Sánchez for treatment Monday.  No labs needed.  Will move and sign orders. AMBROSIO Calvin

## 2020-02-17 ENCOUNTER — INFUSION (OUTPATIENT)
Dept: ONCOLOGY | Facility: HOSPITAL | Age: 70
End: 2020-02-17

## 2020-02-17 VITALS
DIASTOLIC BLOOD PRESSURE: 59 MMHG | RESPIRATION RATE: 16 BRPM | SYSTOLIC BLOOD PRESSURE: 96 MMHG | OXYGEN SATURATION: 84 % | TEMPERATURE: 98 F | HEART RATE: 79 BPM

## 2020-02-17 DIAGNOSIS — Z79.899 ON ANTINEOPLASTIC CHEMOTHERAPY: Primary | ICD-10-CM

## 2020-02-17 DIAGNOSIS — C67.9 MALIGNANT NEOPLASM OF URINARY BLADDER, UNSPECIFIED SITE (HCC): ICD-10-CM

## 2020-02-17 PROCEDURE — 96413 CHEMO IV INFUSION 1 HR: CPT

## 2020-02-17 PROCEDURE — 25010000002 ATEZOLIZUMAB 1200 MG/20ML SOLUTION 20 ML VIAL: Performed by: INTERNAL MEDICINE

## 2020-02-17 RX ORDER — SODIUM CHLORIDE 9 MG/ML
250 INJECTION, SOLUTION INTRAVENOUS ONCE
Status: COMPLETED | OUTPATIENT
Start: 2020-02-17 | End: 2020-02-17

## 2020-02-17 RX ADMIN — SODIUM CHLORIDE 250 ML: 9 INJECTION, SOLUTION INTRAVENOUS at 08:15

## 2020-02-17 RX ADMIN — ATEZOLIZUMAB 1200 MG: 1200 INJECTION, SOLUTION INTRAVENOUS at 08:08

## 2020-03-06 ENCOUNTER — APPOINTMENT (OUTPATIENT)
Dept: LAB | Facility: HOSPITAL | Age: 70
End: 2020-03-06

## 2020-03-06 DIAGNOSIS — C67.9 MALIGNANT NEOPLASM OF URINARY BLADDER, UNSPECIFIED SITE (HCC): ICD-10-CM

## 2020-03-06 DIAGNOSIS — Z79.899 ON ANTINEOPLASTIC CHEMOTHERAPY: ICD-10-CM

## 2020-03-09 ENCOUNTER — APPOINTMENT (OUTPATIENT)
Dept: LAB | Facility: HOSPITAL | Age: 70
End: 2020-03-09

## 2020-03-09 ENCOUNTER — APPOINTMENT (OUTPATIENT)
Dept: ONCOLOGY | Facility: HOSPITAL | Age: 70
End: 2020-03-09

## 2020-03-11 PROBLEM — Z92.3 HISTORY OF RADIATION THERAPY: Status: ACTIVE | Noted: 2020-03-11

## 2020-03-11 RX ORDER — SODIUM CHLORIDE 9 MG/ML
250 INJECTION, SOLUTION INTRAVENOUS ONCE
Status: CANCELLED | OUTPATIENT
Start: 2020-03-12

## 2020-03-12 ENCOUNTER — HOSPITAL ENCOUNTER (OUTPATIENT)
Dept: RADIATION ONCOLOGY | Facility: HOSPITAL | Age: 70
Setting detail: RADIATION/ONCOLOGY SERIES
End: 2020-03-12

## 2020-03-12 ENCOUNTER — OFFICE VISIT (OUTPATIENT)
Dept: ONCOLOGY | Facility: CLINIC | Age: 70
End: 2020-03-12

## 2020-03-12 ENCOUNTER — INFUSION (OUTPATIENT)
Dept: ONCOLOGY | Facility: HOSPITAL | Age: 70
End: 2020-03-12

## 2020-03-12 ENCOUNTER — LAB (OUTPATIENT)
Dept: LAB | Facility: HOSPITAL | Age: 70
End: 2020-03-12

## 2020-03-12 ENCOUNTER — OFFICE VISIT (OUTPATIENT)
Dept: RADIATION ONCOLOGY | Facility: HOSPITAL | Age: 70
End: 2020-03-12

## 2020-03-12 VITALS
HEIGHT: 68 IN | BODY MASS INDEX: 22.73 KG/M2 | OXYGEN SATURATION: 77 % | DIASTOLIC BLOOD PRESSURE: 58 MMHG | WEIGHT: 150 LBS | SYSTOLIC BLOOD PRESSURE: 124 MMHG

## 2020-03-12 VITALS
RESPIRATION RATE: 16 BRPM | WEIGHT: 150 LBS | BODY MASS INDEX: 22.73 KG/M2 | OXYGEN SATURATION: 87 % | SYSTOLIC BLOOD PRESSURE: 116 MMHG | DIASTOLIC BLOOD PRESSURE: 62 MMHG | HEART RATE: 56 BPM | TEMPERATURE: 98 F | HEIGHT: 68 IN

## 2020-03-12 DIAGNOSIS — C67.9 MALIGNANT NEOPLASM OF URINARY BLADDER, UNSPECIFIED SITE (HCC): ICD-10-CM

## 2020-03-12 DIAGNOSIS — F17.200 CURRENT EVERY DAY SMOKER: ICD-10-CM

## 2020-03-12 DIAGNOSIS — C67.8 MALIGNANT NEOPLASM OF OVERLAPPING SITES OF BLADDER (HCC): Primary | ICD-10-CM

## 2020-03-12 DIAGNOSIS — Z79.899 ON ANTINEOPLASTIC CHEMOTHERAPY: ICD-10-CM

## 2020-03-12 DIAGNOSIS — C22.0 HEPATOCELLULAR CARCINOMA (HCC): ICD-10-CM

## 2020-03-12 DIAGNOSIS — J44.9 CHRONIC OBSTRUCTIVE PULMONARY DISEASE, UNSPECIFIED COPD TYPE (HCC): Primary | ICD-10-CM

## 2020-03-12 DIAGNOSIS — Z92.3 HISTORY OF RADIATION THERAPY: ICD-10-CM

## 2020-03-12 LAB
ALBUMIN SERPL-MCNC: 4 G/DL (ref 3.5–5.2)
ALBUMIN/GLOB SERPL: 1 G/DL
ALP SERPL-CCNC: 145 U/L (ref 39–117)
ALT SERPL W P-5'-P-CCNC: 26 U/L (ref 1–41)
ANION GAP SERPL CALCULATED.3IONS-SCNC: 12 MMOL/L (ref 5–15)
AST SERPL-CCNC: 31 U/L (ref 1–40)
BASOPHILS # BLD AUTO: 0.03 10*3/MM3 (ref 0–0.2)
BASOPHILS NFR BLD AUTO: 0.4 % (ref 0–1.5)
BILIRUB SERPL-MCNC: 0.8 MG/DL (ref 0.2–1.2)
BUN BLD-MCNC: 15 MG/DL (ref 8–23)
BUN/CREAT SERPL: 20.5 (ref 7–25)
CALCIUM SPEC-SCNC: 9.9 MG/DL (ref 8.6–10.5)
CHLORIDE SERPL-SCNC: 95 MMOL/L (ref 98–107)
CO2 SERPL-SCNC: 35 MMOL/L (ref 22–29)
CREAT BLD-MCNC: 0.73 MG/DL (ref 0.76–1.27)
DEPRECATED RDW RBC AUTO: 53.3 FL (ref 37–54)
EOSINOPHIL # BLD AUTO: 0.1 10*3/MM3 (ref 0–0.4)
EOSINOPHIL NFR BLD AUTO: 1.4 % (ref 0.3–6.2)
ERYTHROCYTE [DISTWIDTH] IN BLOOD BY AUTOMATED COUNT: 14.3 % (ref 12.3–15.4)
GFR SERPL CREATININE-BSD FRML MDRD: 106 ML/MIN/1.73
GLOBULIN UR ELPH-MCNC: 4.1 GM/DL
GLUCOSE BLD-MCNC: 120 MG/DL (ref 65–99)
HCT VFR BLD AUTO: 50.8 % (ref 37.5–51)
HGB BLD-MCNC: 16.6 G/DL (ref 13–17.7)
HOLD SPECIMEN: NORMAL
HOLD SPECIMEN: NORMAL
LYMPHOCYTES # BLD AUTO: 1.47 10*3/MM3 (ref 0.7–3.1)
LYMPHOCYTES NFR BLD AUTO: 20.1 % (ref 19.6–45.3)
MCH RBC QN AUTO: 33.4 PG (ref 26.6–33)
MCHC RBC AUTO-ENTMCNC: 32.7 G/DL (ref 31.5–35.7)
MCV RBC AUTO: 102.2 FL (ref 79–97)
MONOCYTES # BLD AUTO: 1.03 10*3/MM3 (ref 0.1–0.9)
MONOCYTES NFR BLD AUTO: 14.1 % (ref 5–12)
NEUTROPHILS # BLD AUTO: 4.66 10*3/MM3 (ref 1.7–7)
NEUTROPHILS NFR BLD AUTO: 63.7 % (ref 42.7–76)
PLATELET # BLD AUTO: 99 10*3/MM3 (ref 140–450)
PMV BLD AUTO: 11 FL (ref 6–12)
POTASSIUM BLD-SCNC: 4.4 MMOL/L (ref 3.5–5.2)
PROT SERPL-MCNC: 8.1 G/DL (ref 6–8.5)
RBC # BLD AUTO: 4.97 10*6/MM3 (ref 4.14–5.8)
SODIUM BLD-SCNC: 142 MMOL/L (ref 136–145)
WBC NRBC COR # BLD: 7.31 10*3/MM3 (ref 3.4–10.8)

## 2020-03-12 PROCEDURE — 80053 COMPREHEN METABOLIC PANEL: CPT

## 2020-03-12 PROCEDURE — 99213 OFFICE O/P EST LOW 20 MIN: CPT | Performed by: NURSE PRACTITIONER

## 2020-03-12 PROCEDURE — 36415 COLL VENOUS BLD VENIPUNCTURE: CPT

## 2020-03-12 PROCEDURE — G0463 HOSPITAL OUTPT CLINIC VISIT: HCPCS | Performed by: RADIOLOGY

## 2020-03-12 PROCEDURE — 85025 COMPLETE CBC W/AUTO DIFF WBC: CPT

## 2020-03-12 NOTE — PROGRESS NOTES
[facility]  HEMATOLOGY & ONCOLOGY    W ONC [facility] HEMATOLOGY AND ONCOLOGY  [address]  [facility] [address] [phone]    Patient Name: Eledr Mina  Encounter Date: 11/06/2019  YOB: 1950  Patient Number: [id number]      REASON FOR VISIT: Elder Mina is a 69 y.o. year old male with recurrent bladder carcinoma. He has been seen at [facility] by Dr. Dawn.  He underwent a transurethral resection on 7/24/2019 that found invasive high-grade urothelial carcinoma with the tumor invading the muscularis propria. He then underwent TACE on 8/8/2019 for hepatocellular carcinoma.  He has a history of alcohol cirrhosis. When he was seen by Dr Dawn he did not feel he was a cystectomy candidate and therefore discussed other options. Mr Mina wanted to be treated closer to home and is now being followed by Dr Resendiz.     The patient underwent a repeat TURBT prior to starting radiation and systemic therapy.  Mr. Mina was then started on Tecentriq therapy on 11/22/19.  He is here today for consideration of cycle 6.    He presents today stating he has been feeling very poorly.  He is very fatigued and tired.  His oxygen saturation was only 77% when walking in the office today.  He left his oxygen in the car.  He was not in any distress nor complaining of shortness of breath.  He states he does not wear the oxygen all the time because it is a hassle.  He denies any fever chills or night sweats.  He has had no abdominal pain nausea or vomiting.  He states he just very fatigued and tired.  He states that he has felt so poorly since starting this therapy he does not want to undergo any further treatment at this time.  I advised that we can delay him 1 week and he can discuss this with Dr. Resendiz at that time.    His counts show a white count of 7.31, hemoglobin 16.6 and a platelet count of 99,000.  His CMP shows a  slightly elevated glucose of 120 alkaline phosphatase of 145 for which is stable otherwise normal.    ONCOLOGY HISTORY     Malignant neoplasm of urinary bladder (CMS/HCC)     Initial Diagnosis     Malignant neoplasm of urinary bladder (CMS/HCC)      8/13/2018 Imaging     CT Abd/Pelvis (Adal interpretation from outside facility):  1.  Multiple foci of extraluminal gas posterior to the cecum   suspected to represent bowel perforation most likely involving the   cecum. Medially the appendix appears unremarkable and is thought   unlikely to represent the source. No definite mass appreciated within   the limitations of unenhanced CT technique; endoscopic follow-up   recommended.  2.  Abdominopelvic free fluid with a large centrally calcified   structure in the pouch of Cyurs. This might potential be related to   the above-described bowel perforation, however this is suspected to   more likely be chronic and of indeterminate etiology. A clear urinary   or biliary source is not identified.  3.  Borderline cirrhotic hepatic configuration. Probable mild varices   adjacent to the distal esophagus and proximal stomach. Nonspecific   fat stranding in the upper abdominal mesentery and retroperitoneum.   Mild gastrohepatic adenopathy, uncertain etiology, possibly reactive;   neoplastic etiology cannot be excluded. Correlation with prior   imaging recommended.  4.  Small hypoattenuating area in the posterior RIGHT hepatic lobe   incompletely characterized ; given the borderline cirrhotic   configuration and nonemergent follow-up hepatic protocol CT or MRI is   recommended for further evaluation. Small renal lesions too small to   characterize but statistically likely a cyst.  5.  Incidental and other findings as described above including right   renal stone, small inguinal hernias, degenerative disc disease,   atherosclerosis.      8/14/2018 Imaging     CT Abd/Pelvis:  1. Similar in appearance to the prior study, there are  findings   suspicious for bowel perforation, with extraluminal gas seen adjacent   to the cecum and terminal ileum. This was subsequently evaluated at   the operating room.  2. Small volume of free fluid in the abdomen and pelvis. There is   also a rounded radiodensity free-floating within ascitic fluid in the   pelvis, which is of on certain etiology.. At the time of dictation,   this had been removed at laparoscopy.  3. There are two enhancing lesions arising from the urinary bladder   wall, suspicious for bladder neoplasms. These were subsequently   evaluated at cystoscopy.  4. Morphologic features of cirrhosis. 2.1 cm lesion in the right lobe   of the liver, not definitively characterized on this single phase   study, but suspicious for hepatocellular carcinoma. Recommend further   evaluation with dedicated liver protocol CT or MRI as well as   correlation with serum alpha-fetoprotein levels.  5. Additional findings as above.      8/14/2018 Biopsy     Diagnosis  1)  URINARY BLADDER, LEFT, TRANSURETHRAL RESECTION: NONINVASIVE HIGH GRADE PAPILLARY UROTHELIAL CARCINOMA; NO MUSCULARIS PROPRIA PRESENT.      2)  URINARY BLADER, RIGHT WALL, TRANSURETHRAL RESECTION: NONINVASIVE HIGH GRADE PAPILLARY UROTHELIAL CARCINOMA WITH EXTENSIVE CAUTERY ARTIFACT; NO MUSCULARIS PROPRIA PRESENT.         9/28/2018 Biopsy     TURBT - [facility]:  Final Diagnosis   1.  Urinary bladder, lateral tumor, transurethral resection:  Noninvasive, high-grade papillary urothelial carcinoma  Acute and chronic inflammation  Muscularis propria is present and negative     2.  Urinary bladder, posterior tumor, transurethral resection:  Noninvasive, high-grade papillary urothelial carcinoma  Chronic inflammation  Muscularis propria is not identified     3.  Urinary bladder, right lateral tumor, transurethral resection:   Severe cautery artifact  Intact urothelium is not visualized        AJCC cancer stage: pTa           1/21/2019 Biopsy     TURBT -  [facility]:  Final Diagnosis   1.  Bladder, posterior tumor #1, transurethral resection:  Noninvasive high-grade papillary urothelial carcinoma, pTa.  Detrusor muscle is not present for evaluation     2.  Bladder, area of right ureteral orifice, biopsy:  Urothelial carcinoma in situ, pTis.     3.  Bladder, posterior tumor #2, transurethral resection:  Noninvasive high-grade papillary urothelial carcinoma, pTa.  Detrusor muscle is not present for evaluation           4/18/2019 Imaging     CT Abd/Pelvis (Adal interpretation from outside facility)  1. Redemonstration of a treated lesion in segment VII of the right   hepatic lobe, without evidence of arterial enhancement or washout,   compatible with a LR-TR nonviable.  2. No new liver lesion is identified. Portal vein is patent, without   evidence of thrombus.  3. Cirrhosis, with evidence of portal hypertension      5/20/2019 Biopsy     TURBT - [facility]:  Final Diagnosis   1.  Urinary bladder, left wall, biopsy:  A.  Invasive urothelial carcinoma with nested features invading the lamina propria.  B.  No muscularis propria smooth muscle present for evaluation.     2.  Urinary bladder, posterior wall, biopsy:  A.  Invasive urothelial carcinoma with nested features invading the lamina propria.  B.  No muscularis propria smooth muscle present for evaluation.     3.  Urinary bladder, right wall, biopsy: Invasive papillary urothelial carcinoma, high-grade.     Comment: The tumor invades the muscularis propria.  Focally, invasive carcinoma is seen involving thin smooth muscle bundles.  It is difficult to distinguish whether these muscle bundles represent muscularis propria that has been partially destroyed by the invasive cancer or muscularis mucosa from the lamina propria.  Additional tissue sampling is recommended.     An extra departmental consultation was obtained from Dr. Jhonatan Parson at [facility].  His diagnostic impressions are reflected in the above diagnoses.   Please refer to the complete pathology report from Dr. Parson which is appended.           7/24/2019 Imaging     CT Chest/Abd/Pelvis:  1. Post procedural changes related to prior TACE in segment seven of   the right hepatic lobe. There is a nodular focus of arterial   enhancement at the posterior margin of the treated lesion, compatible   with LR-TR viable. Please note, on the pretreatment, outside MRI from   9/14/2018, this treated lesion had rim-like arterial phase   hyperenhancement, and would have been best characterized as LI-RADS -   M.  2. 1.2 cm soft tissue nodule along the right posterior bladder wall,   decreased in size since 8/14/2018, correlating with one of patient's   known bladder neoplasms. A second previously seen lesion along the   left posterior bladder wall is no longer clearly definable.  3. No findings to suggest metastatic disease in the abdomen or pelvis.  4. Small, nonspecific noncalcified nodule in the right upper lobe.   Recommend attention on follow-up.  5. Numerous additional findings as above.      7/24/2019 Biopsy     TUBRT - Adal:  Diagnosis    1. URINARY BLADDER, RIGHT LATERAL WALL, TRANSURETHRAL RESECTION:  - INVASIVE HIGH GRADE UROTHELIAL CARCINOMA.  - TUMOR INVADES MUSCULARIS PROPRIA.    2. URINARY BLADDER, BASE, TRANSURETHRAL RESECTION:  - UROTHELIAL CARCINOMA IN SITU.  - MUSCULARIS PROPRIA IS PRESENT.        10/1/2019 Imaging     CT Abd/Pelvis:  Impression:  1.  No signs of residual disease, LR TR nonviable.  2.  No new lesions.  3.  Signs of portal hypertension as before.          11/6/2019 Imaging     Ct Chest With Contrast  Result Date: 11/6/2019  1. No evidence of intrathoracic metastatic disease is identified. Moderate emphysema is present. There are a few focal areas of interstitial scarring in the lungs. There is a 3 mm noncalcified subpleural nodule in the right upper lobe which appears benign. 2. Heavy coronary artery calcifications are present. 3. Mild thickening  of the wall of the distal esophagus which may be related to inflammation from reflux disease. Clinical correlation is recommended. 4. Small 1 cm cyst at the upper pole of the left kidney.       Ct Abdomen Pelvis With Contrast  Result Date: 11/6/2019  1. No evidence of intra-abdominal pelvic metastatic disease. There were 3 small liver masses seen on previous MRI, the to similar mass seen in segment 8 has regressed and appears calcified, compatible with the history of interventional chemoembolization. The other 2 liver lesions are not visualized. 2. Poor distention of the bladder with mild bladder wall thickening and no discrete bladder mass. 3. Mild sigmoid diverticulosis. 4. Swirling of the central mesentery compatible with mild mesenteric volvulus, without complication. 5. Heavy aortic atherosclerotic calcification and calcification within the proximal SMA, with no signs of mesenteric ischemia.         11/15/2019 Surgery     Surgery       Procedure: TURBT        Final Diagnosis   1.  Posterior wall bladder tumor, TUR:  High-grade urothelial carcinoma, see comment   2.  Right lateral wall bladder tumor, TUR:  High-grade urothelial carcinoma, see comment   3.  Dome bladder tumor, TUR:  Negative for tumor                    11/22/2019 -  Chemotherapy     OP BLADDER Atezolizumab      12/9/2019 - 1/29/2020 Radiation     Radiation OncologyTreatment Course:  Elder Mina received 6480 cGy in 36 fractions to bladder via external beam radiation therapy.      1/10/2020 Imaging     MRI Brain:  IMPRESSION:  1. No evidence of intracranial metastatic disease. No abnormal  enhancement within the brain.  2. Chronic changes associated with aging, including atrophy and chronic  microvascular ischemic changes as described above.  3. Chronic right maxillary, ethmoid and bilateral frontal sinusitis        Cancer of lateral wall of urinary bladder (CMS/HCC)    11/4/2019 Initial Diagnosis     Cancer of lateral wall of urinary  "bladder (CMS/HCC)           Past Medical History:   Past Medical History:   Diagnosis Date   • Anxiety    • Bladder cancer (CMS/HCC)    • Cataracts, bilateral    • Cirrhosis (CMS/HCC)    • COPD (chronic obstructive pulmonary disease) (CMS/HCC)    • Hematuria    • Liver cancer (CMS/HCC)    • Urinary retention      Past Surgical History:   Past Surgical History:   Procedure Laterality Date   • CYSTOSCOPY     • ENDOSCOPY N/A 9/20/2018    Procedure: ESOPHAGOGASTRODUODENOSCOPY WITH ANESTHESIA;  Surgeon: Sanford Barahona DO;  Location: [facility] ENDOSCOPY;  Service: Gastroenterology   • EXPLORATORY LAPAROTOMY      \"cyst\" removed   • EYE SURGERY Right     cataract   • LIVER SURGERY  12/06/2018    went up through groin to cancerous liver nodule and cut off its blood supply and injected chemo into it   • TRANSURETHRAL RESECTION OF BLADDER TUMOR N/A 9/28/2018    Procedure: CYSTOSCOPY TRANSURETHRAL RESECTION OF BLADDER TUMOR;  Surgeon: Paul Giang MD;  Location: [facility] OR;  Service: Urology   • TRANSURETHRAL RESECTION OF BLADDER TUMOR N/A 1/21/2019    Procedure: CYSTOSCOPY TRANSURETHRAL RESECTION OF BLADDER TUMOR;  Surgeon: Paul Giang MD;  Location: [facility] OR;  Service: Urology   • TRANSURETHRAL RESECTION OF BLADDER TUMOR N/A 5/20/2019    Procedure: CYSTOSCOPY TRANSURETHRAL RESECTION OF BLADDER TUMOR;  Surgeon: Paul Giang MD;  Location: [facility] OR;  Service: Urology   • TRANSURETHRAL RESECTION OF BLADDER TUMOR N/A 11/15/2019    Procedure: CYSTOSCOPY TRANSURETHRAL RESECTION OF BLADDER TUMOR;  Surgeon: Yordy France MD;  Location: [facility] OR;  Service: Urology     Social History:   Social History     Socioeconomic History   • Marital status:      Spouse name: Not on file   • Number of children: Not on file   • Years of education: Not on file   • Highest education level: Not on file   Tobacco Use   • Smoking status: Current Every Day Smoker     Packs/day: 1.00     Years: 40.00     Pack years: " 40.00   • Smokeless tobacco: Never Used   Substance and Sexual Activity   • Alcohol use: No     Frequency: Never   • Drug use: No   • Sexual activity: Defer     Family History:   Family History   Problem Relation Age of Onset   • Colon cancer Father    • Cirrhosis Mother    • Liver cancer Mother    • Liver disease Brother    • Liver disease Brother    • Emphysema Brother    • No Known Problems Maternal Grandmother    • No Known Problems Maternal Grandfather    • No Known Problems Paternal Grandmother    • No Known Problems Paternal Grandfather        Review of Systems   Constitutional: Positive for fatigue. Negative for activity change, appetite change, chills, diaphoresis, fever and unexpected weight change.   HENT: Negative for congestion, facial swelling, mouth sores, nosebleeds, sore throat, trouble swallowing and voice change.    Eyes: Negative for discharge and redness.   Respiratory: Positive for shortness of breath (chronic with exertion). Negative for cough, chest tightness and wheezing.    Cardiovascular: Negative for chest pain, palpitations and leg swelling.   Gastrointestinal: Negative for abdominal distention, abdominal pain, blood in stool, constipation, diarrhea, nausea and vomiting.   Endocrine: Negative.    Genitourinary: Negative for difficulty urinating, dysuria, frequency, hematuria and urgency.   Musculoskeletal: Negative for arthralgias, gait problem and myalgias.   Skin: Negative for color change and rash.   Neurological: Negative for dizziness, weakness, light-headedness, numbness and headaches.   Hematological: Negative for adenopathy. Does not bruise/bleed easily.   Psychiatric/Behavioral: Negative for confusion and sleep disturbance. The patient is not nervous/anxious.         Medications:    Current Outpatient Medications   Medication Sig Dispense Refill   • ALPRAZolam (XANAX) 1 MG tablet Take 1 mg by mouth 2 (Two) Times a Day As Needed for Anxiety.     • budesonide-formoterol  "(SYMBICORT) 160-4.5 MCG/ACT inhaler Inhale 2 puffs Daily.     • ipratropium-albuterol (COMBIVENT RESPIMAT)  MCG/ACT inhaler Inhale 1 puff 4 (Four) Times a Day As Needed for Wheezing.     • Loperamide HCl (IMODIUM PO) Take  by mouth.     • O2 (OXYGEN) Inhale 2 L/min Take As Directed. nightly and when short of air       No current facility-administered medications for this visit.        ALLERGIES:    Allergies   Allergen Reactions   • Penicillins Rash       Objective      Vitals:    03/12/20 0832   BP: 116/62   Pulse: 56   Resp: 16   Temp: 98 °F (36.7 °C)   TempSrc: Temporal   SpO2: (!) 87%   Weight: 68 kg (150 lb)   Height: 172.7 cm (68\")   PainSc: 0-No pain     /62   Pulse 56   Temp 98 °F (36.7 °C) (Temporal)   Resp 16   Ht 172.7 cm (68\")   Wt 68 kg (150 lb)   SpO2 (!) 87%   BMI 22.81 kg/m²     Current Status 3/12/2020   ECOG score 1       PHYSICAL EXAM:  General Appearance:    Alert, cooperative, well nourished in no distress   Head:    Normocephalic, without obvious abnormality, atraumatic   Eyes:    PERRL, conjunctiva pink, sclera clear, EOM's intact   Ears:    Not examined   Nose:   Nares normal, septum midline, mucosa normal, no drainage    Throat:   Lips, mucosa, and tongue normal;mucous membranes moist   Neck:   Supple, Trachea midline   Lungs:     Diminished bilaterally, respirations unlabored   Chest Wall:    No tenderness or deformity    Heart:    Regular rate and rhythm, no murmur, rub or gallop   Abdomen:     Soft, non-tender, bowel sounds active all four quadrants,     no masses, no organomegaly   Extremities:   Extremities without cyanosis or edema   Skin:   Skin color, texture, turgor normal, no rashes or lesions   Lymph nodes:   Cervical, supraclavicular, and axillary nodes normal   Neurologic:   Grossly nonfocal, gait coordinated and smooth, cognition is   preserved.     LABS    Lab Results - Last 18 Months   Lab Units 03/12/20  0830 02/14/20  0840 01/24/20  0801 01/03/20  0838 " 12/13/19  1011 11/22/19  1054   HEMOGLOBIN g/dL 16.6 16.3 16.0 15.9 16.0 15.5   HEMATOCRIT % 50.8 49.7 48.1 47.1 47.1 46.2   MCV fL 102.2* 102.7* 100.6* 99.4* 99.2* 99.4*   WBC 10*3/mm3 7.31 6.68 6.28 6.40 7.38 8.58   RDW % 14.3 14.3 14.3 14.4 14.3 13.8   MPV fL 11.0 10.7 10.5 10.2 10.0 10.4   PLATELETS 10*3/mm3 99* 82* 97* 100* 120* 102*   IMM GRAN % %  --   --  0.5  --  0.4 0.1   NEUTROS ABS 10*3/mm3 4.66 4.19 4.01 3.94 4.05 4.84   LYMPHS ABS 10*3/mm3 1.47 1.23 0.95 1.31 1.90 2.07   MONOS ABS 10*3/mm3 1.03* 1.09* 1.03* 0.80 1.12* 1.12*   EOS ABS 10*3/mm3 0.10 0.12 0.22 0.30 0.24 0.40   BASOS ABS 10*3/mm3 0.03 0.03 0.04 0.03 0.04 0.06   IMMATURE GRANS (ABS) 10*3/mm3  --   --  0.03  --  0.03 0.01   NRBC /100 WBC  --   --  0.0  --  0.0 0.0       Lab Results - Last 18 Months   Lab Units 03/12/20  0830 02/14/20  0840 01/24/20  0801 01/03/20  0838 12/13/19  1011 11/22/19  1054   GLUCOSE mg/dL 120* 124* 122* 124* 103* 90   SODIUM mmol/L 142 140 141 140 141 142   POTASSIUM mmol/L 4.4 4.4 4.3 4.1 4.7 4.2   CO2 mmol/L 35.0* 35.0* 32.0* 32.0* 35.0* 34.0*   CHLORIDE mmol/L 95* 96* 99 99 98 101   ANION GAP mmol/L 12.0 9.0 10.0 9.0 8.0 7.0   CREATININE mg/dL 0.73* 0.73* 0.76 0.77 0.69* 0.75*   BUN mg/dL 15 18 16 17 17 20   BUN / CREAT RATIO  20.5 24.7 21.1 22.1 24.6 26.7*   CALCIUM mg/dL 9.9 9.6 9.6 9.4 9.9 9.9   EGFR IF NONAFRICN AM mL/min/1.73 106 106 101 100 113 103   ALK PHOS U/L 145* 144* 168* 131* 169* 116   TOTAL PROTEIN g/dL 8.1 8.2 8.1 7.9 8.2 7.6   ALT (SGPT) U/L 26 31 28 23 32 26   AST (SGOT) U/L 31 34 30 33 34 26   BILIRUBIN mg/dL 0.8 0.8 0.5 0.5 0.7 0.6   ALBUMIN g/dL 4.00 4.20 4.10 4.10 4.30 4.10   GLOBULIN gm/dL 4.1 4.0 4.0 3.8 3.9 3.5           Assessment/Plan   1.  High-grade urothelial carcinoma of the bladder status post a TURBT back in July but then incidentally was diagnosed with a hepatocellular carcinoma that required TACE.  He has now been deemed a nonsurgical candidate for cystectomy.  Therefore, he  undergoing radiation plus systemic therapy with Tecentriq. He is now ready for cycle 6 but patient states he has been fatigued and tired and does not want any further therapy as of today.  I did discuss this with him that he will need to return next week to talk to Dr. Resendiz regarding his treatment.  2.  Hepatocellular Carcinoma s/p TACE on 8/8/19. LFTs are within normal range.  ALKP slightly elevated at 121 as of 10/31/19.  He is planned to have repeat scans and followup in [place] for this in January.   3.  COPD for which patient is supposed to be wearing oxygen however he only wears it mainly at night.  He left it in the car today and as he was entering the room his oxygen saturation was only 77%.  With oxygen it quickly increased to 90.  He is in no distress.    4.  Mild thrombocytopenia with platelets of 99,000.  This is stable and improved from last visit.      PLAN  1.  Encourage patient to wear oxygen as prescribed  2.  Discussed with the patient the need of treatment and he verbalized understanding.  3.  He will not undergo therapy today and it will be delayed until he speaks with Dr. Resendiz next week.  4.  Patient is wife both were advised to call with any problems or concerns.  5.  Encourage patient to continue following with his primary care provider for medical management as well as other specialists.    All activities occurring within this visit are in accordance with the plan of care as set forth by Dr. Logan Resendiz.    I have spent a total of  ___30_  minutes in face-to-face encounter with the patient, out of which more than 50% was counseling the patient and family regarding coordination of care.  Counseling included but not limited to time spent reviewing labs, treatment plan as well as answering questions.       Devi Loza, APRN    3/12/2020    09:32

## 2020-03-20 ENCOUNTER — APPOINTMENT (OUTPATIENT)
Dept: ONCOLOGY | Facility: HOSPITAL | Age: 70
End: 2020-03-20

## 2020-03-20 ENCOUNTER — APPOINTMENT (OUTPATIENT)
Dept: LAB | Facility: HOSPITAL | Age: 70
End: 2020-03-20

## 2020-04-17 ENCOUNTER — APPOINTMENT (OUTPATIENT)
Dept: CT IMAGING | Facility: HOSPITAL | Age: 70
End: 2020-04-17

## 2020-04-18 ENCOUNTER — NURSE TRIAGE (OUTPATIENT)
Dept: CALL CENTER | Facility: HOSPITAL | Age: 70
End: 2020-04-18

## 2020-04-18 NOTE — TELEPHONE ENCOUNTER
States he sees Dr. IVAN and Dr. Krause. States he has liver and bladder cancer. He also has COPD. States he is having swelling in his legs and feet. They talked to the provider a few days ago and was started on lasix. He was started on it 4/14. States they said if the swelling didn't go down to go to ER but she isn't sure she wants to take him to the ER.     States he is on O2 continuously.     Reason for Disposition  • [1] MILD swelling of both ankles (i.e., pedal edema) AND [2] new onset or worsening    Additional Information  • Negative: Severe difficulty breathing (e.g., struggling for each breath, speaks in single words)  • Negative: Looks like a broken bone or dislocated joint (e.g., crooked or deformed)  • Negative: Sounds like a life-threatening emergency to the triager  • Negative: Chest pain  • Negative: Followed a leg injury  • Negative: [1] Small area of swelling AND [2] followed an insect bite to the area  • Negative: Swelling of one ankle joint  • Negative: Swelling of knee is main symptom  • Negative: Pregnant  • Negative: Postpartum (from 0 to 6 weeks after delivery)  • Negative: Difficulty breathing at rest  • Negative: Entire foot is cool or blue in comparison to other side  • Negative: [1] Can't walk or can barely walk AND [2] new onset  • Negative: [1] Difficulty breathing with exertion (e.g., walking) AND [2] new onset or worsening  • Negative: [1] Red area or streak AND [2] fever  • Negative: [1] Swelling is painful to touch AND [2] fever  • Negative: [1] Cast on leg or ankle AND [2] now increased pain  • Negative: Patient sounds very sick or weak to the triager  • Negative: SEVERE leg swelling (e.g., swelling extends above knee, entire leg is swollen, weeping fluid)  • Negative: [1] Red area or streak [2] large (> 2 in. or 5 cm)  • Negative: [1] Thigh or calf pain AND [2] only 1 side AND [3] present > 1 hour  • Negative: [1] Thigh, calf, or ankle swelling AND [2] only 1 side  • Negative: [1]  "Thigh, calf, or ankle swelling AND [2] bilateral AND [3] 1 side is more swollen  • Negative: Swelling of face, arm or hands  (Exception: slight puffiness of fingers occurring during hot weather)  • Negative: [1] MODERATE leg swelling (e.g., swelling extends up to knees) AND [2] new onset or worsening  • Negative: Looks like a boil, infected sore, deep ulcer or other infected rash (spreading redness, pus)    Answer Assessment - Initial Assessment Questions  1. ONSET: \"When did the swelling start?\" (e.g., minutes, hours, days)      Several days ago  2. LOCATION: \"What part of the leg is swollen?\"  \"Are both legs swollen or just one leg?\"      Lower legs  3. SEVERITY: \"How bad is the swelling?\" (e.g., localized; mild, moderate, severe)   - Localized - small area of swelling localized to one leg   - MILD pedal edema - swelling limited to foot and ankle, pitting edema < 1/4 inch (6 mm) deep, rest and elevation eliminate most or all swelling   - MODERATE edema - swelling of lower leg to knee, pitting edema > 1/4 inch (6 mm) deep, rest and elevation only partially reduce swelling   - SEVERE edema - swelling extends above knee, facial or hand swelling present       Mild-moderate  4. REDNESS: \"Does the swelling look red or infected?\"      Little red but not infected looking  5. PAIN: \"Is the swelling painful to touch?\" If so, ask: \"How painful is it?\"   (Scale 1-10; mild, moderate or severe)      0  6. FEVER: \"Do you have a fever?\" If so, ask: \"What is it, how was it measured, and when did it start?\"       no  7. CAUSE: \"What do you think is causing the leg swelling?\"      Liver cancer  8. MEDICAL HISTORY: \"Do you have a history of heart failure, kidney disease, liver failure, or cancer?\"      See note  9. RECURRENT SYMPTOM: \"Have you had leg swelling before?\" If so, ask: \"When was the last time?\" \"What happened that time?\"      See note  10. OTHER SYMPTOMS: \"Do you have any other symptoms?\" (e.g., chest pain, difficulty " "breathing)        n/a  11. PREGNANCY: \"Is there any chance you are pregnant?\" \"When was your last menstrual period?\"        N/a    Protocols used: LEG SWELLING AND EDEMA-ADULT-AH      "

## 2020-04-28 DIAGNOSIS — C67.9 MALIGNANT NEOPLASM OF URINARY BLADDER, UNSPECIFIED SITE (HCC): ICD-10-CM

## 2020-04-28 DIAGNOSIS — Z79.899 ON ANTINEOPLASTIC CHEMOTHERAPY: ICD-10-CM

## 2020-05-06 ENCOUNTER — APPOINTMENT (OUTPATIENT)
Dept: LAB | Facility: HOSPITAL | Age: 70
End: 2020-05-06

## 2020-05-06 ENCOUNTER — APPOINTMENT (OUTPATIENT)
Dept: ONCOLOGY | Facility: HOSPITAL | Age: 70
End: 2020-05-06

## 2020-06-03 ENCOUNTER — APPOINTMENT (OUTPATIENT)
Dept: CT IMAGING | Facility: HOSPITAL | Age: 70
End: 2020-06-03

## 2020-06-03 ENCOUNTER — HOSPITAL ENCOUNTER (INPATIENT)
Facility: HOSPITAL | Age: 70
LOS: 4 days | Discharge: HOME | End: 2020-06-08
Attending: EMERGENCY MEDICINE | Admitting: FAMILY MEDICINE

## 2020-06-03 ENCOUNTER — APPOINTMENT (OUTPATIENT)
Dept: GENERAL RADIOLOGY | Facility: HOSPITAL | Age: 70
End: 2020-06-03

## 2020-06-03 DIAGNOSIS — Z92.3 HISTORY OF RADIATION THERAPY: ICD-10-CM

## 2020-06-03 DIAGNOSIS — C22.0 HEPATOCELLULAR CARCINOMA (HCC): ICD-10-CM

## 2020-06-03 DIAGNOSIS — J96.01 ACUTE RESPIRATORY FAILURE WITH HYPOXIA AND HYPERCAPNIA (HCC): ICD-10-CM

## 2020-06-03 DIAGNOSIS — F17.200 CURRENT EVERY DAY SMOKER: ICD-10-CM

## 2020-06-03 DIAGNOSIS — Z79.899 ON ANTINEOPLASTIC CHEMOTHERAPY: ICD-10-CM

## 2020-06-03 DIAGNOSIS — J96.01 ACUTE RESPIRATORY FAILURE WITH HYPOXIA (HCC): Primary | ICD-10-CM

## 2020-06-03 DIAGNOSIS — J18.9 PNEUMONIA DUE TO INFECTIOUS ORGANISM, UNSPECIFIED LATERALITY, UNSPECIFIED PART OF LUNG: ICD-10-CM

## 2020-06-03 DIAGNOSIS — K74.60 CIRRHOSIS OF LIVER WITHOUT ASCITES, UNSPECIFIED HEPATIC CIRRHOSIS TYPE (HCC): Chronic | ICD-10-CM

## 2020-06-03 DIAGNOSIS — Z72.0 TOBACCO ABUSE: Chronic | ICD-10-CM

## 2020-06-03 DIAGNOSIS — J96.02 ACUTE RESPIRATORY FAILURE WITH HYPOXIA AND HYPERCAPNIA (HCC): ICD-10-CM

## 2020-06-03 DIAGNOSIS — C67.9 MALIGNANT NEOPLASM OF URINARY BLADDER, UNSPECIFIED SITE (HCC): ICD-10-CM

## 2020-06-03 DIAGNOSIS — J96.22 ACUTE ON CHRONIC RESPIRATORY FAILURE WITH HYPOXIA AND HYPERCAPNIA (HCC): ICD-10-CM

## 2020-06-03 DIAGNOSIS — J96.21 ACUTE ON CHRONIC RESPIRATORY FAILURE WITH HYPOXIA AND HYPERCAPNIA (HCC): ICD-10-CM

## 2020-06-03 DIAGNOSIS — C67.2 CANCER OF LATERAL WALL OF URINARY BLADDER (HCC): ICD-10-CM

## 2020-06-03 DIAGNOSIS — I50.9 CONGESTIVE HEART FAILURE, UNSPECIFIED HF CHRONICITY, UNSPECIFIED HEART FAILURE TYPE (HCC): ICD-10-CM

## 2020-06-03 DIAGNOSIS — R10.84 GENERALIZED ABDOMINAL PAIN: ICD-10-CM

## 2020-06-03 DIAGNOSIS — J44.9 CHRONIC OBSTRUCTIVE PULMONARY DISEASE, UNSPECIFIED COPD TYPE (HCC): ICD-10-CM

## 2020-06-03 DIAGNOSIS — J96.91 RESPIRATORY FAILURE WITH HYPOXIA, UNSPECIFIED CHRONICITY (HCC): ICD-10-CM

## 2020-06-03 LAB
ALBUMIN SERPL-MCNC: 3.5 G/DL (ref 3.5–5.2)
ALBUMIN/GLOB SERPL: 0.9 G/DL
ALP SERPL-CCNC: 145 U/L (ref 39–117)
ALT SERPL W P-5'-P-CCNC: 27 U/L (ref 1–41)
ANION GAP SERPL CALCULATED.3IONS-SCNC: 9 MMOL/L (ref 5–15)
ARTERIAL PATENCY WRIST A: ABNORMAL
AST SERPL-CCNC: 31 U/L (ref 1–40)
ATMOSPHERIC PRESS: 746 MMHG
ATMOSPHERIC PRESS: 747 MMHG
ATMOSPHERIC PRESS: 747 MMHG
B PARAPERT DNA SPEC QL NAA+PROBE: NOT DETECTED
B PERT DNA SPEC QL NAA+PROBE: NOT DETECTED
BASE EXCESS BLDA CALC-SCNC: 15.3 MMOL/L (ref 0–2)
BASE EXCESS BLDA CALC-SCNC: 15.7 MMOL/L (ref 0–2)
BASE EXCESS BLDA CALC-SCNC: 16 MMOL/L (ref 0–2)
BASOPHILS # BLD AUTO: 0.05 10*3/MM3 (ref 0–0.2)
BASOPHILS NFR BLD AUTO: 0.6 % (ref 0–1.5)
BDY SITE: ABNORMAL
BILIRUB SERPL-MCNC: 0.4 MG/DL (ref 0.2–1.2)
BODY TEMPERATURE: 37 C
BUN BLD-MCNC: 17 MG/DL (ref 8–23)
BUN/CREAT SERPL: 41.5 (ref 7–25)
C PNEUM DNA NPH QL NAA+NON-PROBE: NOT DETECTED
CALCIUM SPEC-SCNC: 9.4 MG/DL (ref 8.6–10.5)
CHLORIDE SERPL-SCNC: 92 MMOL/L (ref 98–107)
CO2 SERPL-SCNC: 38 MMOL/L (ref 22–29)
CREAT BLD-MCNC: 0.41 MG/DL (ref 0.76–1.27)
CRP SERPL-MCNC: 9.34 MG/DL (ref 0–0.5)
DEPRECATED RDW RBC AUTO: 57.3 FL (ref 37–54)
EOSINOPHIL # BLD AUTO: 0.04 10*3/MM3 (ref 0–0.4)
EOSINOPHIL NFR BLD AUTO: 0.5 % (ref 0.3–6.2)
EPAP: 6
ERYTHROCYTE [DISTWIDTH] IN BLOOD BY AUTOMATED COUNT: 14.9 % (ref 12.3–15.4)
FLUAV H1 2009 PAND RNA NPH QL NAA+PROBE: NOT DETECTED
FLUAV H1 HA GENE NPH QL NAA+PROBE: NOT DETECTED
FLUAV H3 RNA NPH QL NAA+PROBE: NOT DETECTED
FLUAV SUBTYP SPEC NAA+PROBE: NOT DETECTED
FLUBV RNA ISLT QL NAA+PROBE: NOT DETECTED
GAS FLOW AIRWAY: 4 LPM
GAS FLOW AIRWAY: 4 LPM
GFR SERPL CREATININE-BSD FRML MDRD: >150 ML/MIN/1.73
GLOBULIN UR ELPH-MCNC: 3.7 GM/DL
GLUCOSE BLD-MCNC: 113 MG/DL (ref 65–99)
HADV DNA SPEC NAA+PROBE: NOT DETECTED
HCO3 BLDA-SCNC: 46.3 MMOL/L (ref 20–26)
HCO3 BLDA-SCNC: 47.3 MMOL/L (ref 20–26)
HCO3 BLDA-SCNC: 47.4 MMOL/L (ref 20–26)
HCOV 229E RNA SPEC QL NAA+PROBE: NOT DETECTED
HCOV HKU1 RNA SPEC QL NAA+PROBE: NOT DETECTED
HCOV NL63 RNA SPEC QL NAA+PROBE: NOT DETECTED
HCOV OC43 RNA SPEC QL NAA+PROBE: NOT DETECTED
HCT VFR BLD AUTO: 49.1 % (ref 37.5–51)
HGB BLD-MCNC: 14.9 G/DL (ref 13–17.7)
HMPV RNA NPH QL NAA+NON-PROBE: NOT DETECTED
HOLD SPECIMEN: NORMAL
HOROWITZ INDEX BLD+IHG-RTO: 40 %
HPIV1 RNA SPEC QL NAA+PROBE: NOT DETECTED
HPIV2 RNA SPEC QL NAA+PROBE: NOT DETECTED
HPIV3 RNA NPH QL NAA+PROBE: NOT DETECTED
HPIV4 P GENE NPH QL NAA+PROBE: NOT DETECTED
IPAP: 12
LDH SERPL-CCNC: 160 U/L (ref 135–225)
LYMPHOCYTES # BLD AUTO: 1.09 10*3/MM3 (ref 0.7–3.1)
LYMPHOCYTES NFR BLD AUTO: 12.7 % (ref 19.6–45.3)
Lab: ABNORMAL
M PNEUMO IGG SER IA-ACNC: NOT DETECTED
MCH RBC QN AUTO: 31.9 PG (ref 26.6–33)
MCHC RBC AUTO-ENTMCNC: 30.3 G/DL (ref 31.5–35.7)
MCV RBC AUTO: 105.1 FL (ref 79–97)
MODALITY: ABNORMAL
MONOCYTES # BLD AUTO: 1.18 10*3/MM3 (ref 0.1–0.9)
MONOCYTES NFR BLD AUTO: 13.8 % (ref 5–12)
NEUTROPHILS # BLD AUTO: 6.13 10*3/MM3 (ref 1.7–7)
NEUTROPHILS NFR BLD AUTO: 71.7 % (ref 42.7–76)
NOTIFIED BY: ABNORMAL
NOTIFIED WHO: ABNORMAL
PCO2 BLDA: 85 MM HG (ref 35–45)
PCO2 BLDA: 94.2 MM HG (ref 35–45)
PCO2 BLDA: 99.5 MM HG (ref 35–45)
PH BLDA: 7.29 PH UNITS (ref 7.35–7.45)
PH BLDA: 7.31 PH UNITS (ref 7.35–7.45)
PH BLDA: 7.34 PH UNITS (ref 7.35–7.45)
PLATELET # BLD AUTO: 94 10*3/MM3 (ref 140–450)
PMV BLD AUTO: 10.8 FL (ref 6–12)
PO2 BLDA: 61.6 MM HG (ref 83–108)
PO2 BLDA: 70.4 MM HG (ref 83–108)
PO2 BLDA: 71.7 MM HG (ref 83–108)
POTASSIUM BLD-SCNC: 4.8 MMOL/L (ref 3.5–5.2)
PROCALCITONIN SERPL-MCNC: 0.1 NG/ML (ref 0.1–0.25)
PROT SERPL-MCNC: 7.2 G/DL (ref 6–8.5)
RBC # BLD AUTO: 4.67 10*6/MM3 (ref 4.14–5.8)
RHINOVIRUS RNA SPEC NAA+PROBE: NOT DETECTED
RSV RNA NPH QL NAA+NON-PROBE: NOT DETECTED
SAO2 % BLDCOA: 90.6 % (ref 94–99)
SAO2 % BLDCOA: 93.2 % (ref 94–99)
SAO2 % BLDCOA: 94.2 % (ref 94–99)
SARS-COV-2 RNA PNL SPEC NAA+PROBE: NOT DETECTED
SET MECH RESP RATE: 12
SODIUM BLD-SCNC: 139 MMOL/L (ref 136–145)
TROPONIN T SERPL-MCNC: <0.01 NG/ML (ref 0–0.03)
VENTILATOR MODE: ABNORMAL
WBC NRBC COR # BLD: 8.55 10*3/MM3 (ref 3.4–10.8)
WHOLE BLOOD HOLD SPECIMEN: NORMAL
WHOLE BLOOD HOLD SPECIMEN: NORMAL

## 2020-06-03 PROCEDURE — 87040 BLOOD CULTURE FOR BACTERIA: CPT | Performed by: EMERGENCY MEDICINE

## 2020-06-03 PROCEDURE — 71275 CT ANGIOGRAPHY CHEST: CPT

## 2020-06-03 PROCEDURE — 36600 WITHDRAWAL OF ARTERIAL BLOOD: CPT

## 2020-06-03 PROCEDURE — 87635 SARS-COV-2 COVID-19 AMP PRB: CPT | Performed by: EMERGENCY MEDICINE

## 2020-06-03 PROCEDURE — 82803 BLOOD GASES ANY COMBINATION: CPT

## 2020-06-03 PROCEDURE — 93005 ELECTROCARDIOGRAM TRACING: CPT | Performed by: EMERGENCY MEDICINE

## 2020-06-03 PROCEDURE — 94799 UNLISTED PULMONARY SVC/PX: CPT

## 2020-06-03 PROCEDURE — 85025 COMPLETE CBC W/AUTO DIFF WBC: CPT | Performed by: EMERGENCY MEDICINE

## 2020-06-03 PROCEDURE — 71045 X-RAY EXAM CHEST 1 VIEW: CPT

## 2020-06-03 PROCEDURE — 25010000002 TERBUTALINE PER 1 MG: Performed by: EMERGENCY MEDICINE

## 2020-06-03 PROCEDURE — 36415 COLL VENOUS BLD VENIPUNCTURE: CPT

## 2020-06-03 PROCEDURE — 83615 LACTATE (LD) (LDH) ENZYME: CPT | Performed by: EMERGENCY MEDICINE

## 2020-06-03 PROCEDURE — 99284 EMERGENCY DEPT VISIT MOD MDM: CPT

## 2020-06-03 PROCEDURE — 93010 ELECTROCARDIOGRAM REPORT: CPT | Performed by: INTERNAL MEDICINE

## 2020-06-03 PROCEDURE — 94660 CPAP INITIATION&MGMT: CPT

## 2020-06-03 PROCEDURE — 84484 ASSAY OF TROPONIN QUANT: CPT | Performed by: EMERGENCY MEDICINE

## 2020-06-03 PROCEDURE — 0 IOPAMIDOL PER 1 ML: Performed by: INTERNAL MEDICINE

## 2020-06-03 PROCEDURE — 25010000002 METHYLPREDNISOLONE PER 125 MG: Performed by: EMERGENCY MEDICINE

## 2020-06-03 PROCEDURE — 93005 ELECTROCARDIOGRAM TRACING: CPT

## 2020-06-03 PROCEDURE — 84145 PROCALCITONIN (PCT): CPT | Performed by: EMERGENCY MEDICINE

## 2020-06-03 PROCEDURE — 80053 COMPREHEN METABOLIC PANEL: CPT | Performed by: EMERGENCY MEDICINE

## 2020-06-03 PROCEDURE — 86140 C-REACTIVE PROTEIN: CPT | Performed by: EMERGENCY MEDICINE

## 2020-06-03 PROCEDURE — 0100U HC BIOFIRE FILMARRAY RESP PANEL 2: CPT | Performed by: EMERGENCY MEDICINE

## 2020-06-03 PROCEDURE — 83880 ASSAY OF NATRIURETIC PEPTIDE: CPT | Performed by: INTERNAL MEDICINE

## 2020-06-03 RX ORDER — METHYLPREDNISOLONE SODIUM SUCCINATE 125 MG/2ML
125 INJECTION, POWDER, LYOPHILIZED, FOR SOLUTION INTRAMUSCULAR; INTRAVENOUS ONCE
Status: COMPLETED | OUTPATIENT
Start: 2020-06-03 | End: 2020-06-03

## 2020-06-03 RX ORDER — TERBUTALINE SULFATE 1 MG/ML
0.25 INJECTION, SOLUTION SUBCUTANEOUS ONCE
Status: COMPLETED | OUTPATIENT
Start: 2020-06-03 | End: 2020-06-03

## 2020-06-03 RX ORDER — LORAZEPAM 2 MG/ML
0.5 INJECTION INTRAMUSCULAR ONCE
Status: COMPLETED | OUTPATIENT
Start: 2020-06-04 | End: 2020-06-04

## 2020-06-03 RX ORDER — IPRATROPIUM BROMIDE AND ALBUTEROL SULFATE 2.5; .5 MG/3ML; MG/3ML
3 SOLUTION RESPIRATORY (INHALATION) ONCE
Status: DISCONTINUED | OUTPATIENT
Start: 2020-06-03 | End: 2020-06-04

## 2020-06-03 RX ADMIN — METHYLPREDNISOLONE SODIUM SUCCINATE 125 MG: 125 INJECTION, POWDER, FOR SOLUTION INTRAMUSCULAR; INTRAVENOUS at 20:51

## 2020-06-03 RX ADMIN — TERBUTALINE SULFATE 0.25 MG: 1 INJECTION, SOLUTION SUBCUTANEOUS at 21:04

## 2020-06-03 RX ADMIN — IOPAMIDOL 58 ML: 755 INJECTION, SOLUTION INTRAVENOUS at 22:56

## 2020-06-03 RX ADMIN — SODIUM CHLORIDE 2 G: 900 INJECTION INTRAVENOUS at 23:27

## 2020-06-04 ENCOUNTER — APPOINTMENT (OUTPATIENT)
Dept: CARDIOLOGY | Facility: HOSPITAL | Age: 70
End: 2020-06-04

## 2020-06-04 PROBLEM — J96.01 ACUTE HYPOXEMIC RESPIRATORY FAILURE (HCC): Status: ACTIVE | Noted: 2020-06-04

## 2020-06-04 PROBLEM — J96.02 ACUTE RESPIRATORY FAILURE WITH HYPOXIA AND HYPERCAPNIA (HCC): Status: ACTIVE | Noted: 2020-06-04

## 2020-06-04 PROBLEM — Z72.0 TOBACCO ABUSE: Chronic | Status: ACTIVE | Noted: 2020-06-04

## 2020-06-04 PROBLEM — J96.22 ACUTE ON CHRONIC RESPIRATORY FAILURE WITH HYPOXIA AND HYPERCAPNIA (HCC): Status: ACTIVE | Noted: 2020-06-04

## 2020-06-04 PROBLEM — K74.60 CIRRHOSIS (HCC): Chronic | Status: ACTIVE | Noted: 2020-06-04

## 2020-06-04 PROBLEM — I50.9 CHF (CONGESTIVE HEART FAILURE) (HCC): Status: ACTIVE | Noted: 2020-06-04

## 2020-06-04 PROBLEM — J96.21 ACUTE ON CHRONIC RESPIRATORY FAILURE WITH HYPOXIA AND HYPERCAPNIA (HCC): Status: ACTIVE | Noted: 2020-06-04

## 2020-06-04 PROBLEM — J18.9 PNEUMONIA: Status: ACTIVE | Noted: 2020-06-04

## 2020-06-04 LAB
ALBUMIN SERPL-MCNC: 3.2 G/DL (ref 3.5–5.2)
ALBUMIN/GLOB SERPL: 0.9 G/DL
ALP SERPL-CCNC: 136 U/L (ref 39–117)
ALT SERPL W P-5'-P-CCNC: 25 U/L (ref 1–41)
ANION GAP SERPL CALCULATED.3IONS-SCNC: 11 MMOL/L (ref 5–15)
ARTERIAL PATENCY WRIST A: POSITIVE
AST SERPL-CCNC: 26 U/L (ref 1–40)
ATMOSPHERIC PRESS: 746 MMHG
BASE EXCESS BLDA CALC-SCNC: 16.4 MMOL/L (ref 0–2)
BASOPHILS # BLD AUTO: 0.02 10*3/MM3 (ref 0–0.2)
BASOPHILS NFR BLD AUTO: 0.3 % (ref 0–1.5)
BDY SITE: ABNORMAL
BH CV ECHO MEAS - AO MAX PG (FULL): 1.8 MMHG
BH CV ECHO MEAS - AO MAX PG: 7.2 MMHG
BH CV ECHO MEAS - AO MEAN PG (FULL): 2 MMHG
BH CV ECHO MEAS - AO MEAN PG: 5 MMHG
BH CV ECHO MEAS - AO ROOT AREA (BSA CORRECTED): 1.9
BH CV ECHO MEAS - AO ROOT AREA: 9.1 CM^2
BH CV ECHO MEAS - AO ROOT DIAM: 3.4 CM
BH CV ECHO MEAS - AO V2 MAX: 134 CM/SEC
BH CV ECHO MEAS - AO V2 MEAN: 101 CM/SEC
BH CV ECHO MEAS - AO V2 VTI: 29.3 CM
BH CV ECHO MEAS - AVA(I,A): 2.6 CM^2
BH CV ECHO MEAS - AVA(I,D): 2.6 CM^2
BH CV ECHO MEAS - AVA(V,A): 2.7 CM^2
BH CV ECHO MEAS - AVA(V,D): 2.7 CM^2
BH CV ECHO MEAS - BSA(HAYCOCK): 1.7 M^2
BH CV ECHO MEAS - BSA: 1.8 M^2
BH CV ECHO MEAS - BZI_BMI: 20.1 KILOGRAMS/M^2
BH CV ECHO MEAS - BZI_METRIC_HEIGHT: 175.3 CM
BH CV ECHO MEAS - BZI_METRIC_WEIGHT: 61.7 KG
BH CV ECHO MEAS - EDV(CUBED): 86.4 ML
BH CV ECHO MEAS - EDV(MOD-SP4): 108 ML
BH CV ECHO MEAS - EDV(TEICH): 88.6 ML
BH CV ECHO MEAS - EF(CUBED): 68.4 %
BH CV ECHO MEAS - EF(MOD-SP4): 82.4 %
BH CV ECHO MEAS - EF(TEICH): 60.2 %
BH CV ECHO MEAS - ESV(CUBED): 27.3 ML
BH CV ECHO MEAS - ESV(MOD-SP4): 19 ML
BH CV ECHO MEAS - ESV(TEICH): 35.3 ML
BH CV ECHO MEAS - FS: 31.9 %
BH CV ECHO MEAS - IVS/LVPW: 1.2
BH CV ECHO MEAS - IVSD: 0.94 CM
BH CV ECHO MEAS - LA DIMENSION: 3.2 CM
BH CV ECHO MEAS - LA/AO: 0.94
BH CV ECHO MEAS - LAT PEAK E' VEL: 8.9 CM/SEC
BH CV ECHO MEAS - LV DIASTOLIC VOL/BSA (35-75): 61.6 ML/M^2
BH CV ECHO MEAS - LV MASS(C)D: 124 GRAMS
BH CV ECHO MEAS - LV MASS(C)DI: 70.7 GRAMS/M^2
BH CV ECHO MEAS - LV MAX PG: 5.4 MMHG
BH CV ECHO MEAS - LV MEAN PG: 3 MMHG
BH CV ECHO MEAS - LV SYSTOLIC VOL/BSA (12-30): 10.8 ML/M^2
BH CV ECHO MEAS - LV V1 MAX: 116 CM/SEC
BH CV ECHO MEAS - LV V1 MEAN: 79.7 CM/SEC
BH CV ECHO MEAS - LV V1 VTI: 24.4 CM
BH CV ECHO MEAS - LVIDD: 4.4 CM
BH CV ECHO MEAS - LVIDS: 3 CM
BH CV ECHO MEAS - LVLD AP4: 9.1 CM
BH CV ECHO MEAS - LVLS AP4: 6.4 CM
BH CV ECHO MEAS - LVOT AREA (M): 3.1 CM^2
BH CV ECHO MEAS - LVOT AREA: 3.1 CM^2
BH CV ECHO MEAS - LVOT DIAM: 2 CM
BH CV ECHO MEAS - LVPWD: 0.81 CM
BH CV ECHO MEAS - MED PEAK E' VEL: 8.49 CM/SEC
BH CV ECHO MEAS - MV A MAX VEL: 81.5 CM/SEC
BH CV ECHO MEAS - MV DEC TIME: 0.2 SEC
BH CV ECHO MEAS - MV E MAX VEL: 54.3 CM/SEC
BH CV ECHO MEAS - MV E/A: 0.67
BH CV ECHO MEAS - PA MAX PG: 7.1 MMHG
BH CV ECHO MEAS - PA V2 MAX: 133 CM/SEC
BH CV ECHO MEAS - SI(AO): 151.7 ML/M^2
BH CV ECHO MEAS - SI(CUBED): 33.7 ML/M^2
BH CV ECHO MEAS - SI(LVOT): 43.7 ML/M^2
BH CV ECHO MEAS - SI(MOD-SP4): 50.8 ML/M^2
BH CV ECHO MEAS - SI(TEICH): 30.4 ML/M^2
BH CV ECHO MEAS - SV(AO): 266 ML
BH CV ECHO MEAS - SV(CUBED): 59.1 ML
BH CV ECHO MEAS - SV(LVOT): 76.7 ML
BH CV ECHO MEAS - SV(MOD-SP4): 89 ML
BH CV ECHO MEAS - SV(TEICH): 53.3 ML
BH CV ECHO MEASUREMENTS AVERAGE E/E' RATIO: 6.24
BILIRUB SERPL-MCNC: 0.5 MG/DL (ref 0.2–1.2)
BODY TEMPERATURE: 37 C
BUN BLD-MCNC: 16 MG/DL (ref 8–23)
BUN/CREAT SERPL: 38.1 (ref 7–25)
CALCIUM SPEC-SCNC: 9.3 MG/DL (ref 8.6–10.5)
CHLORIDE SERPL-SCNC: 93 MMOL/L (ref 98–107)
CO2 SERPL-SCNC: 36 MMOL/L (ref 22–29)
CREAT BLD-MCNC: 0.42 MG/DL (ref 0.76–1.27)
DEPRECATED RDW RBC AUTO: 54.9 FL (ref 37–54)
EOSINOPHIL # BLD AUTO: 0 10*3/MM3 (ref 0–0.4)
EOSINOPHIL NFR BLD AUTO: 0 % (ref 0.3–6.2)
EPAP: 6
ERYTHROCYTE [DISTWIDTH] IN BLOOD BY AUTOMATED COUNT: 14.6 % (ref 12.3–15.4)
GFR SERPL CREATININE-BSD FRML MDRD: >150 ML/MIN/1.73
GLOBULIN UR ELPH-MCNC: 3.5 GM/DL
GLUCOSE BLD-MCNC: 172 MG/DL (ref 65–99)
HCO3 BLDA-SCNC: 45.1 MMOL/L (ref 20–26)
HCT VFR BLD AUTO: 43.9 % (ref 37.5–51)
HGB BLD-MCNC: 13.8 G/DL (ref 13–17.7)
HOROWITZ INDEX BLD+IHG-RTO: 40 %
IPAP: 12
L PNEUMO1 AG UR QL IA: NEGATIVE
LEFT ATRIUM VOLUME INDEX: 18.5 ML/M2
LEFT ATRIUM VOLUME: 32.4 CM3
LYMPHOCYTES # BLD AUTO: 0.24 10*3/MM3 (ref 0.7–3.1)
LYMPHOCYTES NFR BLD AUTO: 3.7 % (ref 19.6–45.3)
Lab: ABNORMAL
Lab: ABNORMAL
MAGNESIUM SERPL-MCNC: 1.9 MG/DL (ref 1.6–2.4)
MAXIMAL PREDICTED HEART RATE: 150 BPM
MCH RBC QN AUTO: 32.5 PG (ref 26.6–33)
MCHC RBC AUTO-ENTMCNC: 31.4 G/DL (ref 31.5–35.7)
MCV RBC AUTO: 103.3 FL (ref 79–97)
MODALITY: ABNORMAL
MONOCYTES # BLD AUTO: 0.08 10*3/MM3 (ref 0.1–0.9)
MONOCYTES NFR BLD AUTO: 1.2 % (ref 5–12)
MRSA DNA SPEC QL NAA+PROBE: NORMAL
NEUTROPHILS # BLD AUTO: 6.14 10*3/MM3 (ref 1.7–7)
NEUTROPHILS NFR BLD AUTO: 94.5 % (ref 42.7–76)
NOTIFIED BY: ABNORMAL
NOTIFIED WHO: ABNORMAL
NT-PROBNP SERPL-MCNC: 1394 PG/ML (ref 5–900)
NT-PROBNP SERPL-MCNC: 1772 PG/ML (ref 5–900)
PCO2 BLDA: 72.1 MM HG (ref 35–45)
PH BLDA: 7.4 PH UNITS (ref 7.35–7.45)
PHOSPHATE SERPL-MCNC: 2.9 MG/DL (ref 2.5–4.5)
PLATELET # BLD AUTO: 87 10*3/MM3 (ref 140–450)
PMV BLD AUTO: 10.4 FL (ref 6–12)
PO2 BLDA: 83 MM HG (ref 83–108)
POTASSIUM BLD-SCNC: 4.8 MMOL/L (ref 3.5–5.2)
PROT SERPL-MCNC: 6.7 G/DL (ref 6–8.5)
RBC # BLD AUTO: 4.25 10*6/MM3 (ref 4.14–5.8)
S PNEUM AG SPEC QL LA: NEGATIVE
SAO2 % BLDCOA: 99.5 % (ref 94–99)
SET MECH RESP RATE: 12
SODIUM BLD-SCNC: 140 MMOL/L (ref 136–145)
STRESS TARGET HR: 128 BPM
TROPONIN T SERPL-MCNC: <0.01 NG/ML (ref 0–0.03)
TROPONIN T SERPL-MCNC: <0.01 NG/ML (ref 0–0.03)
VENTILATOR MODE: ABNORMAL
WBC NRBC COR # BLD: 6.5 10*3/MM3 (ref 3.4–10.8)

## 2020-06-04 PROCEDURE — 36600 WITHDRAWAL OF ARTERIAL BLOOD: CPT

## 2020-06-04 PROCEDURE — 25010000002 METHYLPREDNISOLONE PER 125 MG: Performed by: INTERNAL MEDICINE

## 2020-06-04 PROCEDURE — 94799 UNLISTED PULMONARY SVC/PX: CPT

## 2020-06-04 PROCEDURE — 87147 CULTURE TYPE IMMUNOLOGIC: CPT | Performed by: INTERNAL MEDICINE

## 2020-06-04 PROCEDURE — 93306 TTE W/DOPPLER COMPLETE: CPT

## 2020-06-04 PROCEDURE — 83735 ASSAY OF MAGNESIUM: CPT | Performed by: INTERNAL MEDICINE

## 2020-06-04 PROCEDURE — 84100 ASSAY OF PHOSPHORUS: CPT | Performed by: INTERNAL MEDICINE

## 2020-06-04 PROCEDURE — 25010000002 FUROSEMIDE PER 20 MG: Performed by: INTERNAL MEDICINE

## 2020-06-04 PROCEDURE — 87641 MR-STAPH DNA AMP PROBE: CPT | Performed by: INTERNAL MEDICINE

## 2020-06-04 PROCEDURE — 83880 ASSAY OF NATRIURETIC PEPTIDE: CPT | Performed by: INTERNAL MEDICINE

## 2020-06-04 PROCEDURE — 82803 BLOOD GASES ANY COMBINATION: CPT

## 2020-06-04 PROCEDURE — 25010000002 LORAZEPAM PER 2 MG: Performed by: INTERNAL MEDICINE

## 2020-06-04 PROCEDURE — 80053 COMPREHEN METABOLIC PANEL: CPT | Performed by: INTERNAL MEDICINE

## 2020-06-04 PROCEDURE — 85025 COMPLETE CBC W/AUTO DIFF WBC: CPT | Performed by: INTERNAL MEDICINE

## 2020-06-04 PROCEDURE — 87205 SMEAR GRAM STAIN: CPT | Performed by: INTERNAL MEDICINE

## 2020-06-04 PROCEDURE — 25010000002 PERFLUTREN 6.52 MG/ML SUSPENSION: Performed by: INTERNAL MEDICINE

## 2020-06-04 PROCEDURE — 84484 ASSAY OF TROPONIN QUANT: CPT | Performed by: INTERNAL MEDICINE

## 2020-06-04 PROCEDURE — 25010000002 VANCOMYCIN 10 G RECONSTITUTED SOLUTION: Performed by: EMERGENCY MEDICINE

## 2020-06-04 PROCEDURE — 87899 AGENT NOS ASSAY W/OPTIC: CPT | Performed by: INTERNAL MEDICINE

## 2020-06-04 PROCEDURE — 25010000002 ENOXAPARIN PER 10 MG: Performed by: INTERNAL MEDICINE

## 2020-06-04 PROCEDURE — 94640 AIRWAY INHALATION TREATMENT: CPT

## 2020-06-04 PROCEDURE — 93306 TTE W/DOPPLER COMPLETE: CPT | Performed by: INTERNAL MEDICINE

## 2020-06-04 PROCEDURE — 87070 CULTURE OTHR SPECIMN AEROBIC: CPT | Performed by: INTERNAL MEDICINE

## 2020-06-04 PROCEDURE — 25010000002 VANCOMYCIN 10 G RECONSTITUTED SOLUTION: Performed by: INTERNAL MEDICINE

## 2020-06-04 RX ORDER — BUDESONIDE AND FORMOTEROL FUMARATE DIHYDRATE 160; 4.5 UG/1; UG/1
2 AEROSOL RESPIRATORY (INHALATION) DAILY
Status: DISCONTINUED | OUTPATIENT
Start: 2020-06-04 | End: 2020-06-08 | Stop reason: HOSPADM

## 2020-06-04 RX ORDER — ACETAMINOPHEN 650 MG/1
650 SUPPOSITORY RECTAL EVERY 4 HOURS PRN
Status: DISCONTINUED | OUTPATIENT
Start: 2020-06-04 | End: 2020-06-08 | Stop reason: HOSPADM

## 2020-06-04 RX ORDER — ONDANSETRON 4 MG/1
4 TABLET, FILM COATED ORAL EVERY 6 HOURS PRN
Status: DISCONTINUED | OUTPATIENT
Start: 2020-06-04 | End: 2020-06-08 | Stop reason: HOSPADM

## 2020-06-04 RX ORDER — LOPERAMIDE HYDROCHLORIDE 2 MG/1
2 CAPSULE ORAL 3 TIMES DAILY PRN
Status: DISCONTINUED | OUTPATIENT
Start: 2020-06-04 | End: 2020-06-08 | Stop reason: HOSPADM

## 2020-06-04 RX ORDER — ALPRAZOLAM 0.5 MG/1
1 TABLET ORAL 3 TIMES DAILY PRN
Status: DISCONTINUED | OUTPATIENT
Start: 2020-06-04 | End: 2020-06-08 | Stop reason: HOSPADM

## 2020-06-04 RX ORDER — IPRATROPIUM BROMIDE AND ALBUTEROL SULFATE 2.5; .5 MG/3ML; MG/3ML
3 SOLUTION RESPIRATORY (INHALATION) EVERY 4 HOURS PRN
Status: DISCONTINUED | OUTPATIENT
Start: 2020-06-04 | End: 2020-06-07 | Stop reason: SDUPTHER

## 2020-06-04 RX ORDER — ONDANSETRON 2 MG/ML
4 INJECTION INTRAMUSCULAR; INTRAVENOUS EVERY 6 HOURS PRN
Status: DISCONTINUED | OUTPATIENT
Start: 2020-06-04 | End: 2020-06-08 | Stop reason: HOSPADM

## 2020-06-04 RX ORDER — IPRATROPIUM BROMIDE AND ALBUTEROL SULFATE 2.5; .5 MG/3ML; MG/3ML
3 SOLUTION RESPIRATORY (INHALATION)
Status: DISCONTINUED | OUTPATIENT
Start: 2020-06-04 | End: 2020-06-04

## 2020-06-04 RX ORDER — IPRATROPIUM BROMIDE AND ALBUTEROL SULFATE 2.5; .5 MG/3ML; MG/3ML
3 SOLUTION RESPIRATORY (INHALATION)
Status: DISCONTINUED | OUTPATIENT
Start: 2020-06-05 | End: 2020-06-06

## 2020-06-04 RX ORDER — SODIUM CHLORIDE 0.9 % (FLUSH) 0.9 %
10 SYRINGE (ML) INJECTION AS NEEDED
Status: DISCONTINUED | OUTPATIENT
Start: 2020-06-04 | End: 2020-06-08 | Stop reason: HOSPADM

## 2020-06-04 RX ORDER — FUROSEMIDE 10 MG/ML
40 INJECTION INTRAMUSCULAR; INTRAVENOUS EVERY 12 HOURS
Status: DISCONTINUED | OUTPATIENT
Start: 2020-06-04 | End: 2020-06-06

## 2020-06-04 RX ORDER — ACETAMINOPHEN 325 MG/1
650 TABLET ORAL EVERY 4 HOURS PRN
Status: DISCONTINUED | OUTPATIENT
Start: 2020-06-04 | End: 2020-06-08 | Stop reason: HOSPADM

## 2020-06-04 RX ORDER — NICOTINE 21 MG/24HR
1 PATCH, TRANSDERMAL 24 HOURS TRANSDERMAL EVERY 24 HOURS
Status: DISCONTINUED | OUTPATIENT
Start: 2020-06-04 | End: 2020-06-08 | Stop reason: HOSPADM

## 2020-06-04 RX ORDER — ACETAMINOPHEN 160 MG/5ML
650 SOLUTION ORAL EVERY 4 HOURS PRN
Status: DISCONTINUED | OUTPATIENT
Start: 2020-06-04 | End: 2020-06-08 | Stop reason: HOSPADM

## 2020-06-04 RX ORDER — SODIUM CHLORIDE 0.9 % (FLUSH) 0.9 %
10 SYRINGE (ML) INJECTION EVERY 12 HOURS SCHEDULED
Status: DISCONTINUED | OUTPATIENT
Start: 2020-06-04 | End: 2020-06-08 | Stop reason: HOSPADM

## 2020-06-04 RX ORDER — ALPRAZOLAM 0.5 MG/1
1 TABLET ORAL 2 TIMES DAILY PRN
Status: DISCONTINUED | OUTPATIENT
Start: 2020-06-04 | End: 2020-06-04 | Stop reason: SDUPTHER

## 2020-06-04 RX ORDER — METHYLPREDNISOLONE SODIUM SUCCINATE 125 MG/2ML
80 INJECTION, POWDER, LYOPHILIZED, FOR SOLUTION INTRAMUSCULAR; INTRAVENOUS EVERY 6 HOURS
Status: DISCONTINUED | OUTPATIENT
Start: 2020-06-04 | End: 2020-06-04

## 2020-06-04 RX ORDER — GUAIFENESIN 600 MG/1
600 TABLET, EXTENDED RELEASE ORAL EVERY 12 HOURS SCHEDULED
Status: DISCONTINUED | OUTPATIENT
Start: 2020-06-04 | End: 2020-06-08 | Stop reason: HOSPADM

## 2020-06-04 RX ORDER — ALBUTEROL SULFATE 2.5 MG/3ML
2.5 SOLUTION RESPIRATORY (INHALATION) ONCE AS NEEDED
Status: DISCONTINUED | OUTPATIENT
Start: 2020-06-04 | End: 2020-06-08 | Stop reason: HOSPADM

## 2020-06-04 RX ORDER — METHYLPREDNISOLONE SODIUM SUCCINATE 125 MG/2ML
60 INJECTION, POWDER, LYOPHILIZED, FOR SOLUTION INTRAMUSCULAR; INTRAVENOUS EVERY 6 HOURS
Status: DISCONTINUED | OUTPATIENT
Start: 2020-06-04 | End: 2020-06-06

## 2020-06-04 RX ORDER — ALPRAZOLAM 0.5 MG/1
0.5 TABLET ORAL 3 TIMES DAILY PRN
Status: DISCONTINUED | OUTPATIENT
Start: 2020-06-04 | End: 2020-06-04

## 2020-06-04 RX ORDER — FUROSEMIDE 10 MG/ML
40 INJECTION INTRAMUSCULAR; INTRAVENOUS ONCE
Status: DISCONTINUED | OUTPATIENT
Start: 2020-06-04 | End: 2020-06-04

## 2020-06-04 RX ADMIN — FUROSEMIDE 40 MG: 10 INJECTION, SOLUTION INTRAMUSCULAR; INTRAVENOUS at 17:09

## 2020-06-04 RX ADMIN — SODIUM CHLORIDE, PRESERVATIVE FREE 10 ML: 5 INJECTION INTRAVENOUS at 14:49

## 2020-06-04 RX ADMIN — SODIUM CHLORIDE 2 G: 900 INJECTION INTRAVENOUS at 04:43

## 2020-06-04 RX ADMIN — NICOTINE 1 PATCH: 21 PATCH, EXTENDED RELEASE TRANSDERMAL at 04:43

## 2020-06-04 RX ADMIN — METHYLPREDNISOLONE SODIUM SUCCINATE 80 MG: 125 INJECTION, POWDER, FOR SOLUTION INTRAMUSCULAR; INTRAVENOUS at 04:44

## 2020-06-04 RX ADMIN — SODIUM CHLORIDE 2 G: 900 INJECTION INTRAVENOUS at 14:00

## 2020-06-04 RX ADMIN — PERFLUTREN 9.78 MG: 6.52 INJECTION, SUSPENSION INTRAVENOUS at 14:32

## 2020-06-04 RX ADMIN — IPRATROPIUM BROMIDE AND ALBUTEROL SULFATE 3 ML: 2.5; .5 SOLUTION RESPIRATORY (INHALATION) at 18:11

## 2020-06-04 RX ADMIN — FUROSEMIDE 40 MG: 10 INJECTION, SOLUTION INTRAMUSCULAR; INTRAVENOUS at 04:43

## 2020-06-04 RX ADMIN — ALPRAZOLAM 1 MG: 0.5 TABLET ORAL at 17:15

## 2020-06-04 RX ADMIN — BUDESONIDE AND FORMOTEROL FUMARATE DIHYDRATE 2 PUFF: 160; 4.5 AEROSOL RESPIRATORY (INHALATION) at 07:21

## 2020-06-04 RX ADMIN — IPRATROPIUM BROMIDE AND ALBUTEROL SULFATE 3 ML: 2.5; .5 SOLUTION RESPIRATORY (INHALATION) at 07:21

## 2020-06-04 RX ADMIN — SODIUM CHLORIDE 2 G: 900 INJECTION INTRAVENOUS at 20:09

## 2020-06-04 RX ADMIN — GUAIFENESIN 600 MG: 600 TABLET, EXTENDED RELEASE ORAL at 20:09

## 2020-06-04 RX ADMIN — VANCOMYCIN HYDROCHLORIDE 1250 MG: 10 INJECTION, POWDER, LYOPHILIZED, FOR SOLUTION INTRAVENOUS at 23:18

## 2020-06-04 RX ADMIN — VANCOMYCIN HYDROCHLORIDE 1250 MG: 10 INJECTION, POWDER, LYOPHILIZED, FOR SOLUTION INTRAVENOUS at 14:00

## 2020-06-04 RX ADMIN — IPRATROPIUM BROMIDE AND ALBUTEROL SULFATE 3 ML: 2.5; .5 SOLUTION RESPIRATORY (INHALATION) at 11:00

## 2020-06-04 RX ADMIN — LORAZEPAM 0.5 MG: 2 INJECTION INTRAMUSCULAR; INTRAVENOUS at 00:08

## 2020-06-04 RX ADMIN — ALPRAZOLAM 1 MG: 0.5 TABLET ORAL at 03:41

## 2020-06-04 RX ADMIN — METHYLPREDNISOLONE SODIUM SUCCINATE 60 MG: 125 INJECTION, POWDER, FOR SOLUTION INTRAMUSCULAR; INTRAVENOUS at 20:09

## 2020-06-04 RX ADMIN — IPRATROPIUM BROMIDE AND ALBUTEROL SULFATE 3 ML: 2.5; .5 SOLUTION RESPIRATORY (INHALATION) at 15:26

## 2020-06-04 RX ADMIN — VANCOMYCIN HYDROCHLORIDE 1250 MG: 10 INJECTION, POWDER, LYOPHILIZED, FOR SOLUTION INTRAVENOUS at 00:04

## 2020-06-04 RX ADMIN — ENOXAPARIN SODIUM 40 MG: 40 INJECTION SUBCUTANEOUS at 10:53

## 2020-06-04 RX ADMIN — SODIUM CHLORIDE, PRESERVATIVE FREE 10 ML: 5 INJECTION INTRAVENOUS at 20:09

## 2020-06-04 RX ADMIN — METHYLPREDNISOLONE SODIUM SUCCINATE 80 MG: 125 INJECTION, POWDER, FOR SOLUTION INTRAMUSCULAR; INTRAVENOUS at 10:53

## 2020-06-04 RX ADMIN — IPRATROPIUM BROMIDE AND ALBUTEROL SULFATE 3 ML: 2.5; .5 SOLUTION RESPIRATORY (INHALATION) at 03:13

## 2020-06-04 NOTE — PLAN OF CARE
Problem: Patient Care Overview  Goal: Plan of Care Review  Outcome: Ongoing (interventions implemented as appropriate)  Flowsheets (Taken 6/4/2020 0610)  Progress: improving  Plan of Care Reviewed With: patient  Patient Agreement with Plan of Care: agrees  Outcome Summary: Pt c/o anxiety and says he takes more Xanax at home than he is recieving here. He has a BiPAP on at 16L. Most recent PCO2 72.1. Urine, MRSA swab, and Sputum sample sent down to lab. Pt needs to get up to [facility] and use urinal until off BiPAP. Tele. Lasix given IV. IID'd. IV abx. Safety maintained. Will continue to monitor.

## 2020-06-04 NOTE — PAYOR COMM NOTE
"Iain Mina (69 y.o. Male) [id number]   [facility]3   [facility] phone   Fax        Date of Birth Social Security Number Address Home Phone MRN    1950  [address] [facility] [address] [phone] [id number]    Taoist Marital Status          Faith        Admission Date Admission Type Admitting Provider Attending Provider Department, Room/Bed    6/3/20 Emergency Joel Rosado, Joel Ross,  [facility] 3C, 385/1    Discharge Date Discharge Disposition Discharge Destination                       Attending Provider:  Joel Rosado DO    Allergies:  Penicillins    Isolation:  None   Infection:  None   Code Status:  CPR    Ht:  175.3 cm (69\")   Wt:  62 kg (136 lb 11.2 oz)    Admission Cmt:  None   Principal Problem:  None                Active Insurance as of 6/3/2020     Primary Coverage     Payor Plan Insurance Group Employer/Plan Group    HUMANA MEDICARE REPLACEMENT HUMANA MEDICARE REPLACEMENT [id number]     Payor Plan Address Payor Plan Phone Number Payor Plan Fax Number Effective Dates    [address] [address] [phone]  1/1/2018 - None Entered    [facility] [id number]       Subscriber Name Subscriber Birth Date Member ID       IAIN MINA 1950 [id number]                 Emergency Contacts      (Rel.) Home Phone Work Phone Mobile Phone    Ariana Mina (Spouse) [phone] -- [phone]               History & Physical      Jesus Zapata MD at 06/04/20 [address] [place] [address] Medicine Services  HISTORY AND PHYSICAL    Date of Admission: 6/3/2020  Primary Care Physician: Darvin Badillo MD    Subjective     Chief Complaint: Shortness of breath    History of Present Illness  Patient is a 69-year-old white male past medical history of bladder cancer as well as hepatocellular carcinoma and cirrhosis as well as COPD.  He presents to the " emergency room with about a 2-week or more history of worsening shortness of breath cough congestion.  He denies fevers.  He states he has had a little bit of sputum production but not much no color to it.  Approximately 4 weeks ago patient states that he had severe swelling in his lower extremities for which she was prescribed diuretics.  He states the swelling went away and he stopped the diuretics however it sounds like shortly after that is when he started having the breathing problems.  He has been having evaluation in the emergency room ABG shows acute on chronic respiratory failure he is on home oxygen with a PCO2 of 94 PO2 of 61 on 4 L is pH interestingly enough is 7.31.  He is been placed on BiPAP and gotten his CO2 down to 85 he is tolerating it well sats are 94%.  Patient's x-rays show bilateral interstitial and airspace opacities possibly due to atypical infection or interstitial/pulmonary edema on the background of emphysema.  Endobronchial infectious process as well as emphysema and bronchitis.  He also has an elevated BNP.  His Covid swab was negative.  He is being admitted for further evaluation management of respiratory failure probably multifactorial.  I have a concern if he also has some underlying CHF or if his BNP is elevated simply because of right heart strain from COPD.      Review of Systems   14 point review of systems negative except for as per HPI    Otherwise complete ROS reviewed and negative except as mentioned in the HPI.    Past Medical History:   Past Medical History:   Diagnosis Date   • Anxiety    • Bladder cancer (CMS/HCC)    • Cataracts, bilateral    • Cirrhosis (CMS/HCC)    • COPD (chronic obstructive pulmonary disease) (CMS/HCC)    • Hematuria    • Liver cancer (CMS/HCC)    • Urinary retention      Past Surgical History:  Past Surgical History:   Procedure Laterality Date   • CYSTOSCOPY     • ENDOSCOPY N/A 9/20/2018    Procedure: ESOPHAGOGASTRODUODENOSCOPY WITH ANESTHESIA;   "Surgeon: Sanford Barahona DO;  Location: [facility] ENDOSCOPY;  Service: Gastroenterology   • EXPLORATORY LAPAROTOMY      \"cyst\" removed   • EYE SURGERY Right     cataract   • LIVER SURGERY  12/06/2018    went up through groin to cancerous liver nodule and cut off its blood supply and injected chemo into it   • TRANSURETHRAL RESECTION OF BLADDER TUMOR N/A 9/28/2018    Procedure: CYSTOSCOPY TRANSURETHRAL RESECTION OF BLADDER TUMOR;  Surgeon: Paul Giang MD;  Location: [facility] OR;  Service: Urology   • TRANSURETHRAL RESECTION OF BLADDER TUMOR N/A 1/21/2019    Procedure: CYSTOSCOPY TRANSURETHRAL RESECTION OF BLADDER TUMOR;  Surgeon: Paul Giang MD;  Location:  [facility] OR;  Service: Urology   • TRANSURETHRAL RESECTION OF BLADDER TUMOR N/A 5/20/2019    Procedure: CYSTOSCOPY TRANSURETHRAL RESECTION OF BLADDER TUMOR;  Surgeon: Paul Giang MD;  Location:  [facility] OR;  Service: Urology   • TRANSURETHRAL RESECTION OF BLADDER TUMOR N/A 11/15/2019    Procedure: CYSTOSCOPY TRANSURETHRAL RESECTION OF BLADDER TUMOR;  Surgeon: Yordy France MD;  Location:  [facility] OR;  Service: Urology     Social History:  reports that he has been smoking. He has a 40.00 pack-year smoking history. He has never used smokeless tobacco. He reports that he does not drink alcohol or use drugs.    Family History: family history includes Cirrhosis in his mother; Colon cancer in his father; Emphysema in his brother; Liver cancer in his mother; Liver disease in his brother and brother; No Known Problems in his maternal grandfather, maternal grandmother, paternal grandfather, and paternal grandmother.       Allergies:  Allergies   Allergen Reactions   • Penicillins Rash     Medications:  Prior to Admission medications    Medication Sig Start Date End Date Taking? Authorizing Provider   ALPRAZolam (XANAX) 1 MG tablet Take 1 mg by mouth 2 (Two) Times a Day As Needed for Anxiety.    Provider, MD Jany   budesonide-formoterol " "(SYMBICORT) 160-4.5 MCG/ACT inhaler Inhale 2 puffs Daily.    ProviderJany MD   ipratropium-albuterol (COMBIVENT RESPIMAT)  MCG/ACT inhaler Inhale 1 puff 4 (Four) Times a Day As Needed for Wheezing.    Jany Durant MD   Loperamide HCl (IMODIUM PO) Take 1 capsule by mouth 3 (Three) Times a Day As Needed.    Jany Durant MD   O2 (OXYGEN) Inhale 2 L/min Take As Directed. nightly and when short of air    ProviderJany MD     Objective     Vital Signs: /68 (BP Location: Right arm, Patient Position: Lying)   Pulse 88   Temp 98.1 °F (36.7 °C) (Axillary)   Resp 18   Ht 175.3 cm (69\")   Wt 62 kg (136 lb 11.2 oz)   SpO2 94%   BMI 20.19 kg/m²    Physical Exam  Gen.: Chronically ill-appearing white male in no acute distress  HEENT: Atraumatic, normocephalic.  Pupils equal, round, and reactive to light. Extraocular movements intact.  Sclerae anicteric.  External ears negative.  Mucous membranes moist.  Neck is supple without lymphadenopathy.  No JVD is noted.  No carotid bruits are auscultated.  Oropharynx is without erythema or exudate.   Chest: Bilateral wheezes rhonchi and rales  CV: Regular rate and rhythm.  Normal S1-S2.  No gallops, murmurs. or rubs.  Abdomen: Soft, nontender, nondistended.  Active bowel sounds,  No hepatosplenomegaly.  No masses.  No hernias.  Extremities: No clubbing, edema, or cyanosis.  Capillary refill is normal.  Pulses are 2+ and symmetric at radial and dorsalis pedis.  Posterior tibial pulses are intact and 2+ palpable.  Neuro: Patient is awake, alert, and oriented ×3.  Cranial nerves II through XII are grossly intact.  Motor is 5 out of 5 bilaterally.  DTRs are 2+ and symmetric bilaterally. Sensory exam is nonfocal  Skin: Warm, dry, and intact.  No evidence of breakdown.  Sensorium: Anxious    Nursing notes and vital signs reviewed        Results Reviewed:  Lab Results (last 24 hours)     Procedure Component Value Units Date/Time    BNP " [[id number]]  (Abnormal) Collected:  06/03/20 1649    Specimen:  Blood Updated:  06/04/20 0211     proBNP 1,772.0 pg/mL     Narrative:       Among patients with dyspnea, NT-proBNP is highly sensitive for the detection of acute congestive heart failure. In addition NT-proBNP of <300 pg/ml effectively rules out acute congestive heart failure with 99% negative predictive value.    Results may be falsely decreased if patient taking Biotin.      Blood Gas, Arterial [[id number]]  (Abnormal) Collected:  06/03/20 2350    Specimen:  Arterial Blood Updated:  06/03/20 2354     Site Right Brachial     Martin's Test N/A     pH, Arterial 7.345 pH units      Comment: 84 Value below reference range        pCO2, Arterial 85.0 mm Hg      Comment: 86 Value above critical limit        pO2, Arterial 70.4 mm Hg      Comment: 84 Value below reference range        HCO3, Arterial 46.3 mmol/L      Comment: 83 Value above reference range        Base Excess, Arterial 16.0 mmol/L      Comment: 83 Value above reference range        O2 Saturation, Arterial 94.2 %      Temperature 37.0 C      Barometric Pressure for Blood Gas 747 mmHg      Modality BiPap     FIO2 40 %      Ventilator Mode NA     Set [facility] Resp Rate 12.0     IPAP 12     Comment: Meter: [id number]     :  [id number]        EPAP 6     Notified [facility] [id number]     Notified By [id number]     Notified Time 06/03/2020 23:59     Collected by [id number]    Blood Culture - Blood, Arm, Left [[id number]] Collected:  06/03/20 2050    Specimen:  Blood from Arm, Left Updated:  06/03/20 2318    [place] Draw [[id number]] Collected:  06/03/20 1649    Specimen:  Blood Updated:  06/03/20 2201    Narrative:       The following orders were created for panel order [place] Draw.  Procedure                               Abnormality         Status                     ---------                               -----------         ------                     Light Blue Top[[id number]]                                    Final result               Green Top (Gel)[[id number]]                                  Final result               Lavender Top[[id number]]                                     Final result               Red Top[[id number]]                                                                     [place] Blood Culture Mat...[[id number]]                      Final result               Gray Top - Ice[[id number]]                                   Final result                 Please view results for these tests on the individual orders.    Gray Top - Ice [[id number]] Collected:  06/03/20 2049    Specimen:  Blood Updated:  06/03/20 2201     Extra Tube Hold for add-ons.     Comment: Auto resulted.       Blood Gas, Arterial [[id number]]  (Abnormal) Collected:  06/03/20 2130    Specimen:  Arterial Blood Updated:  06/03/20 2138     Site Right Brachial     Martin's Test N/A     pH, Arterial 7.286 pH units      Comment: 84 Value below reference range        pCO2, Arterial 99.5 mm Hg      Comment: 86 Value above critical limit        pO2, Arterial 71.7 mm Hg      Comment: 84 Value below reference range        HCO3, Arterial 47.4 mmol/L      Comment: 83 Value above reference range        Base Excess, Arterial 15.3 mmol/L      Comment: 83 Value above reference range        O2 Saturation, Arterial 93.2 %      Comment: 84 Value below reference range        Temperature 37.0 C      Barometric Pressure for Blood Gas 747 mmHg      Modality Nasal Cannula     Flow Rate 4.0 lpm      Ventilator Mode NA     Notified [facility] [id number]     Notified By [id number]     Notified Time 06/03/2020 21:42     Collected by [id number]     Comment: Meter: [id number]     :  [id number]       Blood Culture - Blood, Arm, Right [[id number]] Collected:  06/03/20 1649    Specimen:  Blood from Arm, Right Updated:  06/03/20 2114    Respiratory Panel, PCR - Swab, Nasopharynx [[id number]]  (Normal) Collected:  06/03/20 2008    Specimen:  Swab from Nasopharynx Updated:  06/03/20 2106     " ADENOVIRUS, PCR Not Detected     Coronavirus 229E Not Detected     Coronavirus HKU1 Not Detected     Coronavirus NL63 Not Detected     Coronavirus OC43 Not Detected     Human Metapneumovirus Not Detected     Human Rhinovirus/Enterovirus Not Detected     Influenza B PCR Not Detected     Parainfluenza Virus 1 Not Detected     Parainfluenza Virus 2 Not Detected     Parainfluenza Virus 3 Not Detected     Parainfluenza Virus 4 Not Detected     Bordetella pertussis pcr Not Detected     Influenza A H1 2009 PCR Not Detected     Chlamydophila pneumoniae PCR Not Detected     Mycoplasma pneumo by PCR Not Detected     Influenza A PCR Not Detected     Influenza A H3 Not Detected     Influenza A H1 Not Detected     RSV, PCR Not Detected     Bordetella parapertussis PCR Not Detected    Narrative:       The coronavirus on the RVP is NOT COVID-19 and is NOT indicative of infection with COVID-19.     COVID-19, ABBOTT IN-HOUSE,NP Swab (NO TRANSPORT MEDIA) 2 HR TAT - Swab, Nasopharynx [[id number]]  (Normal) Collected:  06/03/20 2008    Specimen:  Swab from Nasopharynx Updated:  06/03/20 2045     COVID19 Not Detected    Procalcitonin [[id number]]  (Normal) Collected:  06/03/20 1649    Specimen:  Blood Updated:  06/03/20 1950     Procalcitonin 0.10 ng/mL     Narrative:       As a Marker for Sepsis (Non-Neonates):   1. <0.5 ng/mL represents a low risk of severe sepsis and/or septic shock.  1. >2 ng/mL represents a high risk of severe sepsis and/or septic shock.    As a Marker for Lower Respiratory Tract Infections that require antibiotic therapy:  PCT on Admission     Antibiotic Therapy             6-12 Hrs later  > 0.5                Strongly Recommended            >0.25 - <0.5         Recommended  0.1 - 0.25           Discouraged                   Remeasure/reassess PCT  <0.1                 Strongly Discouraged          Remeasure/reassess PCT      As 28 day mortality risk marker: \"Change in Procalcitonin Result\" (> 80 % or <=80 %) if " Day 0 (or Day 1) and Day 4 values are available. Refer to http://[email or web address]-pct-calculator.com/   Change in PCT <=80 %   A decrease of PCT levels below or equal to 80 % defines a positive change in PCT test result representing a higher risk for 28-day all-cause mortality of patients diagnosed with severe sepsis or septic shock.  Change in PCT > 80 %   A decrease of PCT levels of more than 80 % defines a negative change in PCT result representing a lower risk for 28-day all-cause mortality of patients diagnosed with severe sepsis or septic shock.                Results may be falsely decreased if patient taking Biotin.     Lactate Dehydrogenase [[id number]]  (Normal) Collected:  06/03/20 1649    Specimen:  Blood Updated:  06/03/20 1942      U/L      Comment: Specimen hemolyzed.  Results may be affected.       C-reactive Protein [[id number]]  (Abnormal) Collected:  06/03/20 1649    Specimen:  Blood Updated:  06/03/20 1939     C-Reactive Protein 9.34 mg/dL     Blood Gas, Arterial [[id number]]  (Abnormal) Collected:  06/03/20 1915    Specimen:  Arterial Blood Updated:  06/03/20 1914     Site Right Brachial     Martin's Test N/A     pH, Arterial 7.309 pH units      Comment: 84 Value below reference range        pCO2, Arterial 94.2 mm Hg      Comment: 86 Value above critical limit        pO2, Arterial 61.6 mm Hg      Comment: 84 Value below reference range        HCO3, Arterial 47.3 mmol/L      Comment: 83 Value above reference range        Base Excess, Arterial 15.7 mmol/L      Comment: 83 Value above reference range        O2 Saturation, Arterial 90.6 %      Comment: 84 Value below reference range        Temperature 37.0 C      Barometric Pressure for Blood Gas 746 mmHg      Modality Nasal Cannula     Flow Rate 4.0 lpm      Ventilator Mode NA     Notified [facility] [id number]     Notified By [id number]     Notified Time 06/03/2020 19:19     Collected by [id number]     Comment: Meter: [id number]     :  [id number]        [place] Blood Culture Bottle Set [[id number]] Collected:  06/03/20 1649    Specimen:  Blood from Arm, Right Updated:  06/03/20 1801     Extra Tube Hold for add-ons.     Comment: Auto resulted.       Light Blue Top [[id number]] Collected:  06/03/20 1649    Specimen:  Blood Updated:  06/03/20 1801     Extra Tube hold for add-on     Comment: Auto resulted       Green Top (Gel) [[id number]] Collected:  06/03/20 1649    Specimen:  Blood Updated:  06/03/20 1801     Extra Tube Hold for add-ons.     Comment: Auto resulted.       Lavender Top [[id number]] Collected:  06/03/20 1649    Specimen:  Blood Updated:  06/03/20 1801     Extra Tube hold for add-on     Comment: Auto resulted       Comprehensive Metabolic Panel [[id number]]  (Abnormal) Collected:  06/03/20 1649    Specimen:  Blood Updated:  06/03/20 1742     Glucose 113 mg/dL      BUN 17 mg/dL      Creatinine 0.41 mg/dL      Sodium 139 mmol/L      Potassium 4.8 mmol/L      Chloride 92 mmol/L      CO2 38.0 mmol/L      Calcium 9.4 mg/dL      Total Protein 7.2 g/dL      Albumin 3.50 g/dL      ALT (SGPT) 27 U/L      AST (SGOT) 31 U/L      Alkaline Phosphatase 145 U/L      Total Bilirubin 0.4 mg/dL      eGFR Non African Amer >150 mL/min/1.73      Globulin 3.7 gm/dL      A/G Ratio 0.9 g/dL      BUN/Creatinine Ratio 41.5     Anion Gap 9.0 mmol/L     Narrative:       GFR Normal >60  Chronic Kidney Disease <60  Kidney Failure <15      Troponin [[id number]]  (Normal) Collected:  06/03/20 1649    Specimen:  Blood Updated:  06/03/20 1742     Troponin T <0.010 ng/mL     Narrative:       Troponin T Reference Range:  <= 0.03 ng/mL-   Negative for AMI  >0.03 ng/mL-     Abnormal for myocardial necrosis.  Clinicians would have to utilize clinical acumen, EKG, Troponin and serial changes to determine if it is an Acute Myocardial Infarction or myocardial injury due to an underlying chronic condition.       Results may be falsely decreased if patient taking Biotin.      CBC & Differential  [[id number]] Collected:  06/03/20 1649    Specimen:  Blood Updated:  06/03/20 1714    Narrative:       The following orders were created for panel order CBC & Differential.  Procedure                               Abnormality         Status                     ---------                               -----------         ------                     CBC Auto Differential[[id number]]        Abnormal            Final result                 Please view results for these tests on the individual orders.    CBC Auto Differential [[id number]]  (Abnormal) Collected:  06/03/20 1649    Specimen:  Blood Updated:  06/03/20 1714     WBC 8.55 10*3/mm3      RBC 4.67 10*6/mm3      Hemoglobin 14.9 g/dL      Hematocrit 49.1 %      .1 fL      MCH 31.9 pg      MCHC 30.3 g/dL      RDW 14.9 %      RDW-SD 57.3 fl      MPV 10.8 fL      Platelets 94 10*3/mm3      Neutrophil % 71.7 %      Lymphocyte % 12.7 %      Monocyte % 13.8 %      Eosinophil % 0.5 %      Basophil % 0.6 %      Neutrophils, Absolute 6.13 10*3/mm3      Lymphocytes, Absolute 1.09 10*3/mm3      Monocytes, Absolute 1.18 10*3/mm3      Eosinophils, Absolute 0.04 10*3/mm3      Basophils, Absolute 0.05 10*3/mm3         Imaging Results (Last 24 Hours)     Procedure Component Value Units Date/Time    CT Angiogram Chest [[id number]] Resulted:  06/03/20 2253     Updated:  06/03/20 2311    XR Chest AP [[id number]] Collected:  06/03/20 2103     Updated:  06/03/20 2232    Narrative:       XR CHEST AP- 6/3/2020 9:00 PM CDT     HISTORY: COVID Evaluation, Cough, Fever       COMPARISON: CT 11/06/2019.     FINDINGS:   Interstitial and airspace opacities are seen in both lungs. The lungs  are emphysematous. The heart is normal in size. The pulmonary  vasculature is within normal limits.     The osseous structures and surrounding soft tissues demonstrate no acute  abnormality.       Impression:       1. Bilateral interstitial and airspace opacities could be due to  atypical infection or  interstitial/pulmonary edema on a background of  emphysema.        This report was finalized on 06/03/2020 21:04 by Dr. Cody Goldsmith MD.        I have personally reviewed and interpreted the radiology studies and ECG obtained at time of admission.     Assessment / Plan     Assessment:   Active Hospital Problems    Diagnosis   • Acute respiratory failure with hypoxia and hypercapnia (CMS/HCC)   • CHF (congestive heart failure) (CMS/HCC)   • Pneumonia   • Cirrhosis (CMS/HCC)   • Tobacco abuse   • Acute on chronic respiratory failure with hypoxia and hypercapnia (CMS/HCC)   • COPD (chronic obstructive pulmonary disease) (CMS/HCC)   1.  Acute on chronic respiratory failure with hypoxia and hypercapnia likely multifactorial plans to admit patient continue BiPAP therapy will give some Lasix IV, Solu-Medrol IV, IV antibiotics.  We will culture blood and sputum.  Reassess labs in a.m.  We will also give duo nebs as well as Mucinex for pulmonary toilet.  2.  Possible new onset CHF will check 2D echo diurese reevaluate improvement hopefully.  3.  COPD with a component of exacerbation plans as outlined above.  4.  Pneumonia process culture blood and sputum antibiotics pulmonary toilet.  5.  Cirrhosis he appears well compensated currently no active therapy.  6.  Tobacco abuse encourage smoking cessation nicotine patch.  7.  Anxiety resume home PRN Xanax.  Informed him that we cannot be super aggressive with his anxiety medications as his respiratory status would not tolerate it.        Patient seen after midnight.         Code Status: Full code with DO NOT INTUBATE.     I discussed the patient's findings and my recommendations with the patient and his wife who is his surrogate healthcare decision maker.    Estimated length of stay greater than 2 nights.    Patient seen and examined by me on June 4, 2020 at 2 AM.    Jesus Zapata MD   06/04/20   02:53              Electronically signed by Jesus Zapata MD at 06/04/20  "0301          Emergency Department Notes      Francisco Winslow DO at 06/03/20 1942          Subjective   69-year-old male patient presents emergency room with just feeling weak harsh cough and no energy.  He also has a little bit of pain in his right groin which is new.  He does have a productive cough.  He has not noticed fever or chills he is not really having chest pain.  The patient is under the care of the oncology department because of bladder cancer and liver cancer.          Review of Systems   Constitutional: Positive for appetite change and fatigue. Negative for chills and fever.   HENT: Positive for congestion. Negative for sore throat.    Respiratory: Positive for cough, chest tightness and shortness of breath.    Cardiovascular: Negative for chest pain.   Gastrointestinal: Negative for abdominal pain, diarrhea, nausea and vomiting.   Genitourinary: Negative for dysuria, frequency and urgency.   Musculoskeletal: Negative.    Skin: Negative.    Neurological: Positive for weakness.   Psychiatric/Behavioral: Negative.        Past Medical History:   Diagnosis Date   • Anxiety    • Bladder cancer (CMS/HCC)    • Cataracts, bilateral    • Cirrhosis (CMS/HCC)    • COPD (chronic obstructive pulmonary disease) (CMS/HCC)    • Hematuria    • Liver cancer (CMS/HCC)    • Urinary retention        Allergies   Allergen Reactions   • Penicillins Rash       Past Surgical History:   Procedure Laterality Date   • CYSTOSCOPY     • ENDOSCOPY N/A 9/20/2018    Procedure: ESOPHAGOGASTRODUODENOSCOPY WITH ANESTHESIA;  Surgeon: Sanford Barahona DO;  [facility];  Service: Gastroenterology   • EXPLORATORY LAPAROTOMY      \"cyst\" removed   • EYE SURGERY Right     cataract   • LIVER SURGERY  12/06/2018    went up through groin to cancerous liver nodule and cut off its blood supply and injected chemo into it   • TRANSURETHRAL RESECTION OF BLADDER TUMOR N/A 9/28/2018    Procedure: CYSTOSCOPY TRANSURETHRAL RESECTION OF " BLADDER TUMOR;  Surgeon: Paul Giang MD;  Location:  [facility] OR;  Service: Urology   • TRANSURETHRAL RESECTION OF BLADDER TUMOR N/A 1/21/2019    Procedure: CYSTOSCOPY TRANSURETHRAL RESECTION OF BLADDER TUMOR;  Surgeon: Paul Giang MD;  Location:  [facility] OR;  Service: Urology   • TRANSURETHRAL RESECTION OF BLADDER TUMOR N/A 5/20/2019    Procedure: CYSTOSCOPY TRANSURETHRAL RESECTION OF BLADDER TUMOR;  Surgeon: Paul Giang MD;  Location:  [facility] OR;  Service: Urology   • TRANSURETHRAL RESECTION OF BLADDER TUMOR N/A 11/15/2019    Procedure: CYSTOSCOPY TRANSURETHRAL RESECTION OF BLADDER TUMOR;  Surgeon: Yordy France MD;  Location:  [facility] OR;  Service: Urology       Family History   Problem Relation Age of Onset   • Colon cancer Father    • Cirrhosis Mother    • Liver cancer Mother    • Liver disease Brother    • Liver disease Brother    • Emphysema Brother    • No Known Problems Maternal Grandmother    • No Known Problems Maternal Grandfather    • No Known Problems Paternal Grandmother    • No Known Problems Paternal Grandfather        Social History     Socioeconomic History   • Marital status:      Spouse name: Not on file   • Number of children: Not on file   • Years of education: Not on file   • Highest education level: Not on file   Tobacco Use   • Smoking status: Current Every Day Smoker     Packs/day: 1.00     Years: 40.00     Pack years: 40.00   • Smokeless tobacco: Never Used   Substance and Sexual Activity   • Alcohol use: No     Frequency: Never   • Drug use: No   • Sexual activity: Defer           Objective   Physical Exam   Constitutional: He is oriented to person, place, and time. He appears well-developed. He appears ill. He appears distressed.   HENT:   Head: Normocephalic and atraumatic.   Eyes: Pupils are equal, round, and reactive to light. EOM are normal.   Neck: Normal range of motion. Neck supple.   Cardiovascular: Normal rate and regular rhythm.      Pulmonary/Chest: No tachypnea. He has decreased breath sounds. He has wheezes in the right upper field, the right middle field, the left upper field and the left middle field. He has rhonchi in the right upper field and the left upper field. He has no rales. He exhibits no tenderness and no edema.   Abdominal: Soft. Bowel sounds are normal. There is no tenderness.   Patient I examined the patient in the groin on both sides I did not find any evidence of hernia at this time.   Musculoskeletal:        Right lower leg: Normal.        Left lower leg: Normal.   Neurological: He is alert and oriented to person, place, and time.   Skin: Skin is warm and dry. No rash noted.   Psychiatric: He has a normal mood and affect. His behavior is normal.   Nursing note and vitals reviewed.      Procedures          ED Course                                           MDM  Number of Diagnoses or Management Options  Diagnosis management comments: Case was discussed with Dr. Zapata who is requesting that we do a CTA on the guys chest to see if this is an infectious process or might be related to metastatic cancer or a PE.  Care was turned over to Dr Garcia at 1400.      Final diagnoses:   Acute respiratory failure with hypoxia (CMS/HCC)            Francisco Winslow DO  06/04/20 0445      Electronically signed by Francisco Winslow DO at 06/04/20 0445       Vital Signs (last day)     Date/Time   Temp   Temp src   Pulse   Resp   BP   Patient Position   SpO2    06/04/20 0832   96.9 (36.1)   Axillary   75   18   110/58   Lying   94    06/04/20 0732   --   --   84   --   --   --   --    06/04/20 0721   --   --   84   16   --   --   94    06/04/20 0322   --   --   88   18   --   --   98    06/04/20 0313   --   --   90   18   --   --   99    06/04/20 0223   98.1 (36.7)   Axillary   88   18   122/68   Lying   94    06/04/20 0136   --   --   87   --   129/63   --   --    06/04/20 0135   --   --   --   --   --   --   93    06/03/20 2350   --   --    86   --   --   --   92    06/03/20 2330   --   --   --   --   119/79   --   --    06/03/20 2329   --   --   92   --   --   --   94    06/03/20 2257   --   --   86   --   --   --   95    06/03/20 2249   --   --   86   16   --   --   91    06/03/20 2210   --   --   86   14   --   --   91    06/03/20 2137   --   --   95   --   --   --   91    06/03/20 2103   --   --   89   --   --   --   94    06/03/20 2100   --   --   86   --   138/79   --   93    06/03/20 1842   --   --   97   --   --   --   (!) 73    06/03/20 1837   --   --   96   --   --   --   (!) 86    06/03/20 1638   98.4 (36.9)   Oral   95   22   110/70   Sitting   91                Current Facility-Administered Medications   Medication Dose Route Frequency Provider Last Rate Last Dose   • acetaminophen (TYLENOL) tablet 650 mg  650 mg Oral Q4H PRN Jesus Zapata MD        Or   • acetaminophen (TYLENOL) 160 MG/5ML solution 650 mg  650 mg Oral Q4H PRN Jesus Zapata MD        Or   • acetaminophen (TYLENOL) suppository 650 mg  650 mg Rectal Q4H PRN Jesus Zapata MD       • albuterol (PROVENTIL) nebulizer solution 0.083% 2.5 mg/3mL  2.5 mg Nebulization Once PRN Jesus Zapata MD       • ALPRAZolam (XANAX) tablet 1 mg  1 mg Oral TID PRN Jesus Zapata MD   1 mg at 06/04/20 0341   • aztreonam (AZACTAM) 2 g/100 mL 0.9% NS (mbp)  2 g Intravenous Q8H Jesus Zapata MD       • budesonide-formoterol (SYMBICORT) 160-4.5 MCG/ACT inhaler 2 puff  2 puff Inhalation Daily Jesus Zapata MD   2 puff at 06/04/20 0721   • enoxaparin (LOVENOX) syringe 40 mg  40 mg Subcutaneous Q24H Jesus Zapata MD       • furosemide (LASIX) injection 40 mg  40 mg Intravenous Q12H Jesus Zapata MD   40 mg at 06/04/20 7633   • guaiFENesin (MUCINEX) 12 hr tablet 600 mg  600 mg Oral Q12H Jesus Zapata MD       • ipratropium-albuterol (DUO-NEB) nebulizer solution 3 mL  3 mL Nebulization Once Jesus Zapata MD   Stopped at 06/03/20 2910   •  ipratropium-albuterol (DUO-NEB) nebulizer solution 3 mL  3 mL Nebulization Q4H - RT Jesus Zapata MD   3 mL at 06/04/20 0721   • ipratropium-albuterol (DUO-NEB) nebulizer solution 3 mL  3 mL Nebulization Q4H PRN Jesus Zapata MD       • loperamide (IMODIUM) capsule 2 mg  2 mg Oral TID PRN Jesus Zapata MD       • methylPREDNISolone sodium succinate (SOLU-Medrol) injection 80 mg  80 mg Intravenous Q6H Jesus Zapata MD   80 mg at 06/04/20 0444   • nicotine (NICODERM CQ) 21 MG/24HR patch 1 patch  1 patch Transdermal Q24H Jesus Zapata MD   1 patch at 06/04/20 0443   • O2 (OXYGEN)  2 L/min Inhalation Take As Directed Jesus Zapata MD       • ondansetron (ZOFRAN) tablet 4 mg  4 mg Oral Q6H PRN Jesus Zapata MD        Or   • ondansetron (ZOFRAN) injection 4 mg  4 mg Intravenous Q6H PRN Jesus Zapata MD       • sodium chloride 0.9 % flush 10 mL  10 mL Intravenous Q12H Jesus Zapata MD       • sodium chloride 0.9 % flush 10 mL  10 mL Intravenous PRN Jesus Zapata MD       • vancomycin 1250 mg/250 mL 0.9% NS IVPB ([facility])  1,250 mg Intravenous Q12H Jesus Zapata MD           sats   86 and 73       Pt c/o anxiety and says he takes more Xanax at home than he is recieving here. He has a BiPAP on at 16L. Most recent PCO2 72.1. Urine, MRSA swab, and Sputum sample sent down to lab. Pt needs to get up to [facility] and use urinal until off BiPAP. Tele. Lasix given IV. IID'd. IV abx

## 2020-06-04 NOTE — ED PROVIDER NOTES
"Subjective   69-year-old male patient presents emergency room with just feeling weak harsh cough and no energy.  He also has a little bit of pain in his right groin which is new.  He does have a productive cough.  He has not noticed fever or chills he is not really having chest pain.  The patient is under the care of the oncology department because of bladder cancer and liver cancer.          Review of Systems   Constitutional: Positive for appetite change and fatigue. Negative for chills and fever.   HENT: Positive for congestion. Negative for sore throat.    Respiratory: Positive for cough, chest tightness and shortness of breath.    Cardiovascular: Negative for chest pain.   Gastrointestinal: Negative for abdominal pain, diarrhea, nausea and vomiting.   Genitourinary: Negative for dysuria, frequency and urgency.   Musculoskeletal: Negative.    Skin: Negative.    Neurological: Positive for weakness.   Psychiatric/Behavioral: Negative.        Past Medical History:   Diagnosis Date   • Anxiety    • Bladder cancer (CMS/HCC)    • Cataracts, bilateral    • Cirrhosis (CMS/HCC)    • COPD (chronic obstructive pulmonary disease) (CMS/HCC)    • Hematuria    • Liver cancer (CMS/HCC)    • Urinary retention        Allergies   Allergen Reactions   • Penicillins Rash       Past Surgical History:   Procedure Laterality Date   • CYSTOSCOPY     • ENDOSCOPY N/A 9/20/2018    Procedure: ESOPHAGOGASTRODUODENOSCOPY WITH ANESTHESIA;  Surgeon: Sanford Barahona DO;  Location: [facility] ENDOSCOPY;  Service: Gastroenterology   • EXPLORATORY LAPAROTOMY      \"cyst\" removed   • EYE SURGERY Right     cataract   • LIVER SURGERY  12/06/2018    went up through groin to cancerous liver nodule and cut off its blood supply and injected chemo into it   • TRANSURETHRAL RESECTION OF BLADDER TUMOR N/A 9/28/2018    Procedure: CYSTOSCOPY TRANSURETHRAL RESECTION OF BLADDER TUMOR;  Surgeon: Paul Giang MD;  Location: [facility] OR;  Service: Urology   • " TRANSURETHRAL RESECTION OF BLADDER TUMOR N/A 1/21/2019    Procedure: CYSTOSCOPY TRANSURETHRAL RESECTION OF BLADDER TUMOR;  Surgeon: Paul Giang MD;  Location:  [facility] OR;  Service: Urology   • TRANSURETHRAL RESECTION OF BLADDER TUMOR N/A 5/20/2019    Procedure: CYSTOSCOPY TRANSURETHRAL RESECTION OF BLADDER TUMOR;  Surgeon: Paul Giang MD;  Location:  [facility] OR;  Service: Urology   • TRANSURETHRAL RESECTION OF BLADDER TUMOR N/A 11/15/2019    Procedure: CYSTOSCOPY TRANSURETHRAL RESECTION OF BLADDER TUMOR;  Surgeon: Yordy France MD;  Location:  [facility] OR;  Service: Urology       Family History   Problem Relation Age of Onset   • Colon cancer Father    • Cirrhosis Mother    • Liver cancer Mother    • Liver disease Brother    • Liver disease Brother    • Emphysema Brother    • No Known Problems Maternal Grandmother    • No Known Problems Maternal Grandfather    • No Known Problems Paternal Grandmother    • No Known Problems Paternal Grandfather        Social History     Socioeconomic History   • Marital status:      Spouse name: Not on file   • Number of children: Not on file   • Years of education: Not on file   • Highest education level: Not on file   Tobacco Use   • Smoking status: Current Every Day Smoker     Packs/day: 1.00     Years: 40.00     Pack years: 40.00   • Smokeless tobacco: Never Used   Substance and Sexual Activity   • Alcohol use: No     Frequency: Never   • Drug use: No   • Sexual activity: Defer           Objective   Physical Exam   Constitutional: He is oriented to person, place, and time. He appears well-developed. He appears ill. He appears distressed.   HENT:   Head: Normocephalic and atraumatic.   Eyes: Pupils are equal, round, and reactive to light. EOM are normal.   Neck: Normal range of motion. Neck supple.   Cardiovascular: Normal rate and regular rhythm.   Pulmonary/Chest: No tachypnea. He has decreased breath sounds. He has wheezes in the right upper field, the  right middle field, the left upper field and the left middle field. He has rhonchi in the right upper field and the left upper field. He has no rales. He exhibits no tenderness and no edema.   Abdominal: Soft. Bowel sounds are normal. There is no tenderness.   Patient I examined the patient in the groin on both sides I did not find any evidence of hernia at this time.   Musculoskeletal:        Right lower leg: Normal.        Left lower leg: Normal.   Neurological: He is alert and oriented to person, place, and time.   Skin: Skin is warm and dry. No rash noted.   Psychiatric: He has a normal mood and affect. His behavior is normal.   Nursing note and vitals reviewed.      Procedures           ED Course                                           MDM  Number of Diagnoses or Management Options  Diagnosis management comments: Case was discussed with Dr. Zapata who is requesting that we do a CTA on the guys chest to see if this is an infectious process or might be related to metastatic cancer or a PE.  Care was turned over to Dr Garcia at 2230.      Final diagnoses:   Acute respiratory failure with hypoxia (CMS/HCC)            Francisco Winslow DO  06/04/20 0445

## 2020-06-04 NOTE — H&P
[facility] Medicine Services  HISTORY AND PHYSICAL    Date of Admission: 6/3/2020  Primary Care Physician: Darvin Badillo MD    Subjective     Chief Complaint: Shortness of breath    History of Present Illness  Patient is a 69-year-old white male past medical history of bladder cancer as well as hepatocellular carcinoma and cirrhosis as well as COPD.  He presents to the emergency room with about a 2-week or more history of worsening shortness of breath cough congestion.  He denies fevers.  He states he has had a little bit of sputum production but not much no color to it.  Approximately 4 weeks ago patient states that he had severe swelling in his lower extremities for which she was prescribed diuretics.  He states the swelling went away and he stopped the diuretics however it sounds like shortly after that is when he started having the breathing problems.  He has been having evaluation in the emergency room ABG shows acute on chronic respiratory failure he is on home oxygen with a PCO2 of 94 PO2 of 61 on 4 L is pH interestingly enough is 7.31.  He is been placed on BiPAP and gotten his CO2 down to 85 he is tolerating it well sats are 94%.  Patient's x-rays show bilateral interstitial and airspace opacities possibly due to atypical infection or interstitial/pulmonary edema on the background of emphysema.  Endobronchial infectious process as well as emphysema and bronchitis.  He also has an elevated BNP.  His Covid swab was negative.  He is being admitted for further evaluation management of respiratory failure probably multifactorial.  I have a concern if he also has some underlying CHF or if his BNP is elevated simply because of right heart strain from COPD.      Review of Systems   14 point review of systems negative except for as per HPI    Otherwise complete ROS reviewed and negative except as mentioned in the HPI.    Past Medical History:   Past Medical History:    "  Diagnosis Date   • Anxiety    • Bladder cancer (CMS/HCC)    • Cataracts, bilateral    • Cirrhosis (CMS/HCC)    • COPD (chronic obstructive pulmonary disease) (CMS/HCC)    • Hematuria    • Liver cancer (CMS/HCC)    • Urinary retention      Past Surgical History:  Past Surgical History:   Procedure Laterality Date   • CYSTOSCOPY     • ENDOSCOPY N/A 9/20/2018    Procedure: ESOPHAGOGASTRODUODENOSCOPY WITH ANESTHESIA;  Surgeon: Sanford Barahona DO;  Location: [facility] ENDOSCOPY;  Service: Gastroenterology   • EXPLORATORY LAPAROTOMY      \"cyst\" removed   • EYE SURGERY Right     cataract   • LIVER SURGERY  12/06/2018    went up through groin to cancerous liver nodule and cut off its blood supply and injected chemo into it   • TRANSURETHRAL RESECTION OF BLADDER TUMOR N/A 9/28/2018    Procedure: CYSTOSCOPY TRANSURETHRAL RESECTION OF BLADDER TUMOR;  Surgeon: Paul Giang MD;  Location: [facility] OR;  Service: Urology   • TRANSURETHRAL RESECTION OF BLADDER TUMOR N/A 1/21/2019    Procedure: CYSTOSCOPY TRANSURETHRAL RESECTION OF BLADDER TUMOR;  Surgeon: Paul Giang MD;  Location: [facility] OR;  Service: Urology   • TRANSURETHRAL RESECTION OF BLADDER TUMOR N/A 5/20/2019    Procedure: CYSTOSCOPY TRANSURETHRAL RESECTION OF BLADDER TUMOR;  Surgeon: Paul Giang MD;  Location: [facility] OR;  Service: Urology   • TRANSURETHRAL RESECTION OF BLADDER TUMOR N/A 11/15/2019    Procedure: CYSTOSCOPY TRANSURETHRAL RESECTION OF BLADDER TUMOR;  Surgeon: Yordy France MD;  Location: [facility] OR;  Service: Urology     Social History:  reports that he has been smoking. He has a 40.00 pack-year smoking history. He has never used smokeless tobacco. He reports that he does not drink alcohol or use drugs.    Family History: family history includes Cirrhosis in his mother; Colon cancer in his father; Emphysema in his brother; Liver cancer in his mother; Liver disease in his brother and brother; No Known Problems in his maternal " "grandfather, maternal grandmother, paternal grandfather, and paternal grandmother.       Allergies:  Allergies   Allergen Reactions   • Penicillins Rash     Medications:  Prior to Admission medications    Medication Sig Start Date End Date Taking? Authorizing Provider   ALPRAZolam (XANAX) 1 MG tablet Take 1 mg by mouth 2 (Two) Times a Day As Needed for Anxiety.    Jany Durant MD   budesonide-formoterol (SYMBICORT) 160-4.5 MCG/ACT inhaler Inhale 2 puffs Daily.    Jany Durant MD   ipratropium-albuterol (COMBIVENT RESPIMAT)  MCG/ACT inhaler Inhale 1 puff 4 (Four) Times a Day As Needed for Wheezing.    Jany Durant MD   Loperamide HCl (IMODIUM PO) Take 1 capsule by mouth 3 (Three) Times a Day As Needed.    Jany Durant MD   O2 (OXYGEN) Inhale 2 L/min Take As Directed. nightly and when short of air    ProviderJany MD     Objective     Vital Signs: /68 (BP Location: Right arm, Patient Position: Lying)   Pulse 88   Temp 98.1 °F (36.7 °C) (Axillary)   Resp 18   Ht 175.3 cm (69\")   Wt 62 kg (136 lb 11.2 oz)   SpO2 94%   BMI 20.19 kg/m²   Physical Exam  Gen.: Chronically ill-appearing white male in no acute distress  HEENT: Atraumatic, normocephalic.  Pupils equal, round, and reactive to light. Extraocular movements intact.  Sclerae anicteric.  External ears negative.  Mucous membranes moist.  Neck is supple without lymphadenopathy.  No JVD is noted.  No carotid bruits are auscultated.  Oropharynx is without erythema or exudate.   Chest: Bilateral wheezes rhonchi and rales  CV: Regular rate and rhythm.  Normal S1-S2.  No gallops, murmurs. or rubs.  Abdomen: Soft, nontender, nondistended.  Active bowel sounds,  No hepatosplenomegaly.  No masses.  No hernias.  Extremities: No clubbing, edema, or cyanosis.  Capillary refill is normal.  Pulses are 2+ and symmetric at radial and dorsalis pedis.  Posterior tibial pulses are intact and 2+ palpable.  Neuro: Patient is " awake, alert, and oriented ×3.  Cranial nerves II through XII are grossly intact.  Motor is 5 out of 5 bilaterally.  DTRs are 2+ and symmetric bilaterally. Sensory exam is nonfocal  Skin: Warm, dry, and intact.  No evidence of breakdown.  Sensorium: Anxious    Nursing notes and vital signs reviewed        Results Reviewed:  Lab Results (last 24 hours)     Procedure Component Value Units Date/Time    BNP [[id number]]  (Abnormal) Collected:  06/03/20 1649    Specimen:  Blood Updated:  06/04/20 0211     proBNP 1,772.0 pg/mL     Narrative:       Among patients with dyspnea, NT-proBNP is highly sensitive for the detection of acute congestive heart failure. In addition NT-proBNP of <300 pg/ml effectively rules out acute congestive heart failure with 99% negative predictive value.    Results may be falsely decreased if patient taking Biotin.      Blood Gas, Arterial [[id number]]  (Abnormal) Collected:  06/03/20 2350    Specimen:  Arterial Blood Updated:  06/03/20 2354     Site Right Brachial     Martin's Test N/A     pH, Arterial 7.345 pH units      Comment: 84 Value below reference range        pCO2, Arterial 85.0 mm Hg      Comment: 86 Value above critical limit        pO2, Arterial 70.4 mm Hg      Comment: 84 Value below reference range        HCO3, Arterial 46.3 mmol/L      Comment: 83 Value above reference range        Base Excess, Arterial 16.0 mmol/L      Comment: 83 Value above reference range        O2 Saturation, Arterial 94.2 %      Temperature 37.0 C      Barometric Pressure for Blood Gas 747 mmHg      Modality BiPap     FIO2 40 %      Ventilator Mode NA     Set [facility] Resp Rate 12.0     IPAP 12     Comment: Meter: [id number]     :  [id number]        [facility] 6     Notified [facility] [id number]     Notified By [id number]     Notified Time 06/03/2020 23:59     Collected by [id number]    Blood Culture - Blood, Arm, Left [[id number]] Collected:  06/03/20 2050    Specimen:  Blood from Arm, Left Updated:  06/03/20 6965    [place]  Draw [[id number]] Collected:  06/03/20 1649    Specimen:  Blood Updated:  06/03/20 2201    Narrative:       The following orders were created for panel order [facility] Draw.  Procedure                               Abnormality         Status                     ---------                               -----------         ------                     Light Blue Top[[id number]]                                   Final result               Green Top (Gel)[[id number]]                                  Final result               Lavender Top[[id number]]                                     Final result               Red Top[[id number]]                                                                     [facility] Blood Culture Mat...[[id number]]                      Final result               Gray Top - Ice[[id number]]                                   Final result                 Please view results for these tests on the individual orders.    Gray Top - Ice [[id number]] Collected:  06/03/20 2049    Specimen:  Blood Updated:  06/03/20 2201     Extra Tube Hold for add-ons.     Comment: Auto resulted.       Blood Gas, Arterial [[id number]]  (Abnormal) Collected:  06/03/20 2130    Specimen:  Arterial Blood Updated:  06/03/20 2138     Site Right Brachial     Martin's Test N/A     pH, Arterial 7.286 pH units      Comment: 84 Value below reference range        pCO2, Arterial 99.5 mm Hg      Comment: 86 Value above critical limit        pO2, Arterial 71.7 mm Hg      Comment: 84 Value below reference range        HCO3, Arterial 47.4 mmol/L      Comment: 83 Value above reference range        Base Excess, Arterial 15.3 mmol/L      Comment: 83 Value above reference range        O2 Saturation, Arterial 93.2 %      Comment: 84 Value below reference range        Temperature 37.0 C      Barometric Pressure for Blood Gas 747 mmHg      Modality Nasal Cannula     Flow Rate 4.0 lpm      Ventilator Mode NA     Notified Who [id number]     Notified By [id number]      Notified Time 06/03/2020 21:42     Collected by [id number]     Comment: Meter: [id number]     :  [id number]       Blood Culture - Blood, Arm, Right [[id number]] Collected:  06/03/20 1649    Specimen:  Blood from Arm, Right Updated:  06/03/20 2114    Respiratory Panel, PCR - Swab, Nasopharynx [[id number]]  (Normal) Collected:  06/03/20 2008    Specimen:  Swab from Nasopharynx Updated:  06/03/20 2106     ADENOVIRUS, PCR Not Detected     Coronavirus 229E Not Detected     Coronavirus HKU1 Not Detected     Coronavirus NL63 Not Detected     Coronavirus OC43 Not Detected     Human Metapneumovirus Not Detected     Human Rhinovirus/Enterovirus Not Detected     Influenza B PCR Not Detected     Parainfluenza Virus 1 Not Detected     Parainfluenza Virus 2 Not Detected     Parainfluenza Virus 3 Not Detected     Parainfluenza Virus 4 Not Detected     Bordetella pertussis pcr Not Detected     Influenza A H1 2009 PCR Not Detected     Chlamydophila pneumoniae PCR Not Detected     Mycoplasma pneumo by PCR Not Detected     Influenza A PCR Not Detected     Influenza A H3 Not Detected     Influenza A H1 Not Detected     RSV, PCR Not Detected     Bordetella parapertussis PCR Not Detected    Narrative:       The coronavirus on the RVP is NOT COVID-19 and is NOT indicative of infection with COVID-19.     COVID-19, ABBOTT IN-HOUSE,NP Swab (NO TRANSPORT MEDIA) 2 HR TAT - Swab, Nasopharynx [[id number]]  (Normal) Collected:  06/03/20 2008    Specimen:  Swab from Nasopharynx Updated:  06/03/20 2045     COVID19 Not Detected    Procalcitonin [[id number]]  (Normal) Collected:  06/03/20 1649    Specimen:  Blood Updated:  06/03/20 1950     Procalcitonin 0.10 ng/mL     Narrative:       As a Marker for Sepsis (Non-Neonates):   1. <0.5 ng/mL represents a low risk of severe sepsis and/or septic shock.  1. >2 ng/mL represents a high risk of severe sepsis and/or septic shock.    As a Marker for Lower Respiratory Tract Infections that require  "antibiotic therapy:  PCT on Admission     Antibiotic Therapy             6-12 Hrs later  > 0.5                Strongly Recommended            >0.25 - <0.5         Recommended  0.1 - 0.25           Discouraged                   Remeasure/reassess PCT  <0.1                 Strongly Discouraged          Remeasure/reassess PCT      As 28 day mortality risk marker: \"Change in Procalcitonin Result\" (> 80 % or <=80 %) if Day 0 (or Day 1) and Day 4 values are available. Refer to [email or web address]   Change in PCT <=80 %   A decrease of PCT levels below or equal to 80 % defines a positive change in PCT test result representing a higher risk for 28-day all-cause mortality of patients diagnosed with severe sepsis or septic shock.  Change in PCT > 80 %   A decrease of PCT levels of more than 80 % defines a negative change in PCT result representing a lower risk for 28-day all-cause mortality of patients diagnosed with severe sepsis or septic shock.                Results may be falsely decreased if patient taking Biotin.     Lactate Dehydrogenase [[id number]]  (Normal) Collected:  06/03/20 1649    Specimen:  Blood Updated:  06/03/20 1942      U/L      Comment: Specimen hemolyzed.  Results may be affected.       C-reactive Protein [[id number]]  (Abnormal) Collected:  06/03/20 1649    Specimen:  Blood Updated:  06/03/20 1939     C-Reactive Protein 9.34 mg/dL     Blood Gas, Arterial [[id number]]  (Abnormal) Collected:  06/03/20 1915    Specimen:  Arterial Blood Updated:  06/03/20 1914     Site Right Brachial     Martin's Test N/A     pH, Arterial 7.309 pH units      Comment: 84 Value below reference range        pCO2, Arterial 94.2 mm Hg      Comment: 86 Value above critical limit        pO2, Arterial 61.6 mm Hg      Comment: 84 Value below reference range        HCO3, Arterial 47.3 mmol/L      Comment: 83 Value above reference range        Base Excess, Arterial 15.7 mmol/L      Comment: 83 Value above " reference range        O2 Saturation, Arterial 90.6 %      Comment: 84 Value below reference range        Temperature 37.0 C      Barometric Pressure for Blood Gas 746 mmHg      Modality Nasal Cannula     Flow Rate 4.0 lpm      Ventilator Mode NA     Notified [facility] [id number]     Notified By [id number]     Notified Time 06/03/2020 19:19     Collected by [id number]     Comment: Meter: [id number]     :  [id number]       [place] Blood Culture Bottle Set [[id number]] Collected:  06/03/20 1649    Specimen:  Blood from Arm, Right Updated:  06/03/20 1801     Extra Tube Hold for add-ons.     Comment: Auto resulted.       Light Blue Top [[id number]] Collected:  06/03/20 1649    Specimen:  Blood Updated:  06/03/20 1801     Extra Tube hold for add-on     Comment: Auto resulted       Green Top (Gel) [[id number]] Collected:  06/03/20 1649    Specimen:  Blood Updated:  06/03/20 1801     Extra Tube Hold for add-ons.     Comment: Auto resulted.       Lavender Top [[id number]] Collected:  06/03/20 1649    Specimen:  Blood Updated:  06/03/20 1801     Extra Tube hold for add-on     Comment: Auto resulted       Comprehensive Metabolic Panel [[id number]]  (Abnormal) Collected:  06/03/20 1649    Specimen:  Blood Updated:  06/03/20 1742     Glucose 113 mg/dL      BUN 17 mg/dL      Creatinine 0.41 mg/dL      Sodium 139 mmol/L      Potassium 4.8 mmol/L      Chloride 92 mmol/L      CO2 38.0 mmol/L      Calcium 9.4 mg/dL      Total Protein 7.2 g/dL      Albumin 3.50 g/dL      ALT (SGPT) 27 U/L      AST (SGOT) 31 U/L      Alkaline Phosphatase 145 U/L      Total Bilirubin 0.4 mg/dL      eGFR Non African Amer >150 mL/min/1.73      Globulin 3.7 gm/dL      A/G Ratio 0.9 g/dL      BUN/Creatinine Ratio 41.5     Anion Gap 9.0 mmol/L     Narrative:       GFR Normal >60  Chronic Kidney Disease <60  Kidney Failure <15      Troponin [[id number]]  (Normal) Collected:  06/03/20 1649    Specimen:  Blood Updated:  06/03/20 1742     Troponin T <0.010 ng/mL      Narrative:       Troponin T Reference Range:  <= 0.03 ng/mL-   Negative for AMI  >0.03 ng/mL-     Abnormal for myocardial necrosis.  Clinicians would have to utilize clinical acumen, EKG, Troponin and serial changes to determine if it is an Acute Myocardial Infarction or myocardial injury due to an underlying chronic condition.       Results may be falsely decreased if patient taking Biotin.      CBC & Differential [[id number]] Collected:  06/03/20 1649    Specimen:  Blood Updated:  06/03/20 1714    Narrative:       The following orders were created for panel order CBC & Differential.  Procedure                               Abnormality         Status                     ---------                               -----------         ------                     CBC Auto Differential[[id number]]        Abnormal            Final result                 Please view results for these tests on the individual orders.    CBC Auto Differential [[id number]]  (Abnormal) Collected:  06/03/20 1649    Specimen:  Blood Updated:  06/03/20 1714     WBC 8.55 10*3/mm3      RBC 4.67 10*6/mm3      Hemoglobin 14.9 g/dL      Hematocrit 49.1 %      .1 fL      MCH 31.9 pg      MCHC 30.3 g/dL      RDW 14.9 %      RDW-SD 57.3 fl      MPV 10.8 fL      Platelets 94 10*3/mm3      Neutrophil % 71.7 %      Lymphocyte % 12.7 %      Monocyte % 13.8 %      Eosinophil % 0.5 %      Basophil % 0.6 %      Neutrophils, Absolute 6.13 10*3/mm3      Lymphocytes, Absolute 1.09 10*3/mm3      Monocytes, Absolute 1.18 10*3/mm3      Eosinophils, Absolute 0.04 10*3/mm3      Basophils, Absolute 0.05 10*3/mm3         Imaging Results (Last 24 Hours)     Procedure Component Value Units Date/Time    CT Angiogram Chest [[id number]] Resulted:  06/03/20 2253     Updated:  06/03/20 2311    XR Chest AP [[id number]] Collected:  06/03/20 2103     Updated:  06/03/20 2232    Narrative:       XR CHEST AP- 6/3/2020 9:00 PM CDT     HISTORY: COVID Evaluation, Cough, Fever          COMPARISON: CT 11/06/2019.     FINDINGS:   Interstitial and airspace opacities are seen in both lungs. The lungs  are emphysematous. The heart is normal in size. The pulmonary  vasculature is within normal limits.     The osseous structures and surrounding soft tissues demonstrate no acute  abnormality.       Impression:       1. Bilateral interstitial and airspace opacities could be due to  atypical infection or interstitial/pulmonary edema on a background of  emphysema.        This report was finalized on 06/03/2020 21:04 by Dr. Cody Goldsmith MD.        I have personally reviewed and interpreted the radiology studies and ECG obtained at time of admission.     Assessment / Plan     Assessment:   Active Hospital Problems    Diagnosis   • Acute respiratory failure with hypoxia and hypercapnia (CMS/HCC)   • CHF (congestive heart failure) (CMS/HCC)   • Pneumonia   • Cirrhosis (CMS/HCC)   • Tobacco abuse   • Acute on chronic respiratory failure with hypoxia and hypercapnia (CMS/HCC)   • COPD (chronic obstructive pulmonary disease) (CMS/HCC)   1.  Acute on chronic respiratory failure with hypoxia and hypercapnia likely multifactorial plans to admit patient continue BiPAP therapy will give some Lasix IV, Solu-Medrol IV, IV antibiotics.  We will culture blood and sputum.  Reassess labs in a.m.  We will also give duo nebs as well as Mucinex for pulmonary toilet.  2.  Possible new onset CHF will check 2D echo diurese reevaluate improvement hopefully.  3.  COPD with a component of exacerbation plans as outlined above.  4.  Pneumonia process culture blood and sputum antibiotics pulmonary toilet.  5.  Cirrhosis he appears well compensated currently no active therapy.  6.  Tobacco abuse encourage smoking cessation nicotine patch.  7.  Anxiety resume home PRN Xanax.  Informed him that we cannot be super aggressive with his anxiety medications as his respiratory status would not tolerate it.        Patient seen after  midnight.         Code Status: Full code with DO NOT INTUBATE.     I discussed the patient's findings and my recommendations with the patient and his wife who is his surrogate healthcare decision maker.    Estimated length of stay greater than 2 nights.    Patient seen and examined by me on June 4, 2020 at 2 AM.    Jesus Zapata MD   06/04/20   02:53

## 2020-06-04 NOTE — NURSING NOTE
On arrival pt angry/ irritable/ aggitated, and multiple requests. Instructed me to get the doctor up here now and tell him to hurry. Call was placed to dr bernard with pt requests. Dr bernard states I am putting orders in now. Informed pt I spoke with the dr, I am waiting for orders from him. Performed comfort measures- repositioned, covered with blankets, uncovered feet as pt requested, then he yelled at me to cover his feet up- did as he requested. Continued frequent calls, but no specific requests except that nothing is being done. Supervisor informed and is going to speak with him.

## 2020-06-04 NOTE — CONSULTS
"Pharmacy Dosing Service  Pharmacokinetics  Vancomycin Initial Evaluation    Assessment/Action/Plan:  Initiated Vancomycin 1250 mg IVPB every 12 hours. Patient initially received Vancomycin 1250mg IV once on 6/4/20 ~0000.  Vancomycin trough ordered prior to 4th dose on 6/5/20 before 1200 dose..  Current vancomycin end date: 6/14 at 0000. Pharmacy will monitor renal function and adjust dose accordingly.     Subjective:  Elder Mina is a 69 y.o. male with a Vancomycin \"Pharmacy to Dose\" consult for the treatment of PNA .  Day 1 of therapy.    Objective:  Ht: 175.3 cm (69\"); Wt: 62 kg (136 lb 11.2 oz)  Estimated Creatinine Clearance: 75.3 mL/min (A) (by C-G formula based on SCr of 0.41 mg/dL (L)).   Lab Results   Component Value Date    CREATININE 0.41 (L) 06/03/2020    CREATININE 0.73 (L) 03/12/2020    CREATININE 0.73 (L) 02/14/2020    CREATININE 0.80 11/06/2019      Lab Results   Component Value Date    WBC 8.55 06/03/2020    WBC 7.31 03/12/2020    WBC 6.68 02/14/2020      Baseline culture results:  Microbiology Results (last 10 days)       Procedure Component Value - Date/Time    Respiratory Panel, PCR - Swab, Nasopharynx [[id number]]  (Normal) Collected:  06/03/20 2008    Lab Status:  Final result Specimen:  Swab from Nasopharynx Updated:  06/03/20 2106     ADENOVIRUS, PCR Not Detected     Coronavirus 229E Not Detected     Coronavirus HKU1 Not Detected     Coronavirus NL63 Not Detected     Coronavirus OC43 Not Detected     Human Metapneumovirus Not Detected     Human Rhinovirus/Enterovirus Not Detected     Influenza B PCR Not Detected     Parainfluenza Virus 1 Not Detected     Parainfluenza Virus 2 Not Detected     Parainfluenza Virus 3 Not Detected     Parainfluenza Virus 4 Not Detected     Bordetella pertussis pcr Not Detected     Influenza A H1 2009 PCR Not Detected     Chlamydophila pneumoniae PCR Not Detected     Mycoplasma pneumo by PCR Not Detected     Influenza A PCR Not Detected     Influenza A H3 Not " Detected     Influenza A H1 Not Detected     RSV, PCR Not Detected     Bordetella parapertussis PCR Not Detected    Narrative:       The coronavirus on the RVP is NOT COVID-19 and is NOT indicative of infection with COVID-19.     COVID-19, ABBOTT IN-HOUSE,NP Swab (NO TRANSPORT MEDIA) 2 HR TAT - Swab, Nasopharynx [[id number]]  (Normal) Collected:  06/03/20 2008    Lab Status:  Final result Specimen:  Swab from Nasopharynx Updated:  06/03/20 2045     COVID19 Not Detected            Dionne Rollins [facility]  06/04/20 03:32

## 2020-06-04 NOTE — PROGRESS NOTES
Discharge Planning Assessment  [facility]     Patient Name: Elder Mina  MRN: [id number]  Today's Date: 6/4/2020    Admit Date: 6/3/2020    Discharge Needs Assessment     Row Name 06/04/20 1332       Living Environment    Lives With  spouse  (Pended)     Name(s) of Who Lives With Patient  Darlyn  (Pended)     Current Living Arrangements  home/apartment/condo  (Pended)     Primary Care Provided by  self  (Pended)     Provides Primary Care For  no one  (Pended)     Quality of Family Relationships  helpful;involved;supportive  (Pended)        Resource/Environmental Concerns    Resource/Environmental Concerns  none  (Pended)     Transportation Concerns  car, none  (Pended)        Transition Planning    Patient/Family Anticipates Transition to  home  (Pended)     Patient/Family Anticipated Services at Transition  none  (Pended)     Transportation Anticipated  family or friend will provide  (Pended)        Discharge Needs Assessment    Readmission Within the Last 30 Days  no previous admission in last 30 days  (Pended)     Concerns to be Addressed  discharge planning  (Pended)     Equipment Currently Used at Home  oxygen;nebulizer  (Pended)     Equipment Needed After Discharge  none  (Pended)     Discharge Coordination/Progress  Pt has rx coverage and a PCP. Pt lived at home w/spouse prior to admission and plans to return home at d/c. Pt denies any DME or service needs at this time. SW will follow and assist w/any service needs that may arise.   (Pended)         Discharge Plan    No documentation.       Destination      Coordination has not been started for this encounter.      Durable Medical Equipment      Coordination has not been started for this encounter.      Dialysis/Infusion      Coordination has not been started for this encounter.      Home Medical Care      Coordination has not been started for this encounter.      Therapy      Coordination has not been started for this encounter.      Community Resources       Coordination has not been started for this encounter.          Demographic Summary    No documentation.       Functional Status    No documentation.       Psychosocial    No documentation.       Abuse/Neglect    No documentation.       Legal    No documentation.       Substance Abuse    No documentation.       Patient Forms    No documentation.           Karlie Noreenr

## 2020-06-05 LAB
ANION GAP SERPL CALCULATED.3IONS-SCNC: 9 MMOL/L (ref 5–15)
BASOPHILS # BLD AUTO: 0.01 10*3/MM3 (ref 0–0.2)
BASOPHILS NFR BLD AUTO: 0.1 % (ref 0–1.5)
BUN BLD-MCNC: 25 MG/DL (ref 8–23)
BUN/CREAT SERPL: 42.4 (ref 7–25)
CALCIUM SPEC-SCNC: 9.4 MG/DL (ref 8.6–10.5)
CHLORIDE SERPL-SCNC: 92 MMOL/L (ref 98–107)
CO2 SERPL-SCNC: 43 MMOL/L (ref 22–29)
CREAT BLD-MCNC: 0.59 MG/DL (ref 0.76–1.27)
DEPRECATED RDW RBC AUTO: 53.7 FL (ref 37–54)
EOSINOPHIL # BLD AUTO: 0 10*3/MM3 (ref 0–0.4)
EOSINOPHIL NFR BLD AUTO: 0 % (ref 0.3–6.2)
ERYTHROCYTE [DISTWIDTH] IN BLOOD BY AUTOMATED COUNT: 14.6 % (ref 12.3–15.4)
GFR SERPL CREATININE-BSD FRML MDRD: 136 ML/MIN/1.73
GLUCOSE BLD-MCNC: 158 MG/DL (ref 65–99)
HCT VFR BLD AUTO: 41.3 % (ref 37.5–51)
HGB BLD-MCNC: 13.3 G/DL (ref 13–17.7)
LYMPHOCYTES # BLD AUTO: 0.34 10*3/MM3 (ref 0.7–3.1)
LYMPHOCYTES NFR BLD AUTO: 3.9 % (ref 19.6–45.3)
MAGNESIUM SERPL-MCNC: 1.9 MG/DL (ref 1.6–2.4)
MCH RBC QN AUTO: 32.8 PG (ref 26.6–33)
MCHC RBC AUTO-ENTMCNC: 32.2 G/DL (ref 31.5–35.7)
MCV RBC AUTO: 101.7 FL (ref 79–97)
MONOCYTES # BLD AUTO: 0.49 10*3/MM3 (ref 0.1–0.9)
MONOCYTES NFR BLD AUTO: 5.6 % (ref 5–12)
NEUTROPHILS # BLD AUTO: 7.83 10*3/MM3 (ref 1.7–7)
NEUTROPHILS NFR BLD AUTO: 89.9 % (ref 42.7–76)
PLATELET # BLD AUTO: 97 10*3/MM3 (ref 140–450)
PMV BLD AUTO: 10.9 FL (ref 6–12)
POTASSIUM BLD-SCNC: 3.9 MMOL/L (ref 3.5–5.2)
RBC # BLD AUTO: 4.06 10*6/MM3 (ref 4.14–5.8)
SODIUM BLD-SCNC: 144 MMOL/L (ref 136–145)
VANCOMYCIN TROUGH SERPL-MCNC: 17.6 MCG/ML (ref 5–20)
WBC NRBC COR # BLD: 8.71 10*3/MM3 (ref 3.4–10.8)

## 2020-06-05 PROCEDURE — 83735 ASSAY OF MAGNESIUM: CPT | Performed by: INTERNAL MEDICINE

## 2020-06-05 PROCEDURE — 25010000002 METHYLPREDNISOLONE PER 125 MG: Performed by: INTERNAL MEDICINE

## 2020-06-05 PROCEDURE — 25010000002 FUROSEMIDE PER 20 MG: Performed by: INTERNAL MEDICINE

## 2020-06-05 PROCEDURE — 80048 BASIC METABOLIC PNL TOTAL CA: CPT | Performed by: INTERNAL MEDICINE

## 2020-06-05 PROCEDURE — 94799 UNLISTED PULMONARY SVC/PX: CPT

## 2020-06-05 PROCEDURE — 25010000002 ENOXAPARIN PER 10 MG: Performed by: INTERNAL MEDICINE

## 2020-06-05 PROCEDURE — 80202 ASSAY OF VANCOMYCIN: CPT | Performed by: INTERNAL MEDICINE

## 2020-06-05 PROCEDURE — 85025 COMPLETE CBC W/AUTO DIFF WBC: CPT | Performed by: INTERNAL MEDICINE

## 2020-06-05 PROCEDURE — 25010000002 VANCOMYCIN PER 500 MG: Performed by: INTERNAL MEDICINE

## 2020-06-05 PROCEDURE — 94660 CPAP INITIATION&MGMT: CPT

## 2020-06-05 RX ORDER — VANCOMYCIN HYDROCHLORIDE 1 G/200ML
1000 INJECTION, SOLUTION INTRAVENOUS ONCE
Status: COMPLETED | OUTPATIENT
Start: 2020-06-05 | End: 2020-06-05

## 2020-06-05 RX ORDER — ACETAZOLAMIDE 250 MG/1
500 TABLET ORAL ONCE
Status: COMPLETED | OUTPATIENT
Start: 2020-06-05 | End: 2020-06-05

## 2020-06-05 RX ORDER — VANCOMYCIN HYDROCHLORIDE 1 G/200ML
1000 INJECTION, SOLUTION INTRAVENOUS EVERY 12 HOURS
Status: DISCONTINUED | OUTPATIENT
Start: 2020-06-05 | End: 2020-06-05

## 2020-06-05 RX ADMIN — IPRATROPIUM BROMIDE AND ALBUTEROL SULFATE 3 ML: 2.5; .5 SOLUTION RESPIRATORY (INHALATION) at 06:13

## 2020-06-05 RX ADMIN — SODIUM CHLORIDE, PRESERVATIVE FREE 10 ML: 5 INJECTION INTRAVENOUS at 08:07

## 2020-06-05 RX ADMIN — NICOTINE 1 PATCH: 21 PATCH, EXTENDED RELEASE TRANSDERMAL at 03:23

## 2020-06-05 RX ADMIN — METHYLPREDNISOLONE SODIUM SUCCINATE 60 MG: 125 INJECTION, POWDER, FOR SOLUTION INTRAMUSCULAR; INTRAVENOUS at 15:57

## 2020-06-05 RX ADMIN — ACETAZOLAMIDE 500 MG: 250 TABLET ORAL at 11:29

## 2020-06-05 RX ADMIN — GUAIFENESIN 600 MG: 600 TABLET, EXTENDED RELEASE ORAL at 21:53

## 2020-06-05 RX ADMIN — ALPRAZOLAM 1 MG: 0.5 TABLET ORAL at 00:55

## 2020-06-05 RX ADMIN — VANCOMYCIN HYDROCHLORIDE 1000 MG: 1 INJECTION, SOLUTION INTRAVENOUS at 15:57

## 2020-06-05 RX ADMIN — FUROSEMIDE 40 MG: 10 INJECTION, SOLUTION INTRAMUSCULAR; INTRAVENOUS at 15:56

## 2020-06-05 RX ADMIN — ALPRAZOLAM 1 MG: 0.5 TABLET ORAL at 11:30

## 2020-06-05 RX ADMIN — IPRATROPIUM BROMIDE AND ALBUTEROL SULFATE 3 ML: 2.5; .5 SOLUTION RESPIRATORY (INHALATION) at 15:25

## 2020-06-05 RX ADMIN — ALPRAZOLAM 1 MG: 0.5 TABLET ORAL at 21:53

## 2020-06-05 RX ADMIN — SODIUM CHLORIDE 2 G: 900 INJECTION INTRAVENOUS at 03:22

## 2020-06-05 RX ADMIN — SODIUM CHLORIDE, PRESERVATIVE FREE 10 ML: 5 INJECTION INTRAVENOUS at 20:41

## 2020-06-05 RX ADMIN — GUAIFENESIN 600 MG: 600 TABLET, EXTENDED RELEASE ORAL at 08:06

## 2020-06-05 RX ADMIN — ENOXAPARIN SODIUM 40 MG: 40 INJECTION SUBCUTANEOUS at 08:07

## 2020-06-05 RX ADMIN — IPRATROPIUM BROMIDE AND ALBUTEROL SULFATE 3 ML: 2.5; .5 SOLUTION RESPIRATORY (INHALATION) at 10:59

## 2020-06-05 RX ADMIN — METHYLPREDNISOLONE SODIUM SUCCINATE 60 MG: 125 INJECTION, POWDER, FOR SOLUTION INTRAMUSCULAR; INTRAVENOUS at 21:53

## 2020-06-05 RX ADMIN — SODIUM CHLORIDE 2 G: 900 INJECTION INTRAVENOUS at 11:30

## 2020-06-05 RX ADMIN — IPRATROPIUM BROMIDE AND ALBUTEROL SULFATE 3 ML: 2.5; .5 SOLUTION RESPIRATORY (INHALATION) at 19:19

## 2020-06-05 RX ADMIN — METHYLPREDNISOLONE SODIUM SUCCINATE 60 MG: 125 INJECTION, POWDER, FOR SOLUTION INTRAMUSCULAR; INTRAVENOUS at 03:22

## 2020-06-05 RX ADMIN — METHYLPREDNISOLONE SODIUM SUCCINATE 60 MG: 125 INJECTION, POWDER, FOR SOLUTION INTRAMUSCULAR; INTRAVENOUS at 11:29

## 2020-06-05 RX ADMIN — BUDESONIDE AND FORMOTEROL FUMARATE DIHYDRATE 2 PUFF: 160; 4.5 AEROSOL RESPIRATORY (INHALATION) at 08:56

## 2020-06-05 RX ADMIN — FUROSEMIDE 40 MG: 10 INJECTION, SOLUTION INTRAMUSCULAR; INTRAVENOUS at 03:22

## 2020-06-05 RX ADMIN — SODIUM CHLORIDE 2 G: 900 INJECTION INTRAVENOUS at 20:41

## 2020-06-05 NOTE — PROGRESS NOTES
[facility] Medicine Services  INPATIENT PROGRESS NOTE    Patient Name: Elder Mina  Date of Admission: 6/3/2020  Today's Date: 06/05/20  Length of Stay: 1  Primary Care Physician: Darvin Badillo MD    Subjective   Chief Complaint: Follow-up shortness of breath    HPI   Seen and examined.  Feels better.  Currently on BiPAP fully awake, alert, oriented.  Feels much better he states.  Denies shortness of breath.  No chest pain.  Was able to eat breakfast earlier without issue.        Review of Systems   All pertinent negatives and positives are as above. All other systems have been reviewed and are negative unless otherwise stated.     Objective    Temp:  [97.4 °F (36.3 °C)-98.9 °F (37.2 °C)] 97.4 °F (36.3 °C)  Heart Rate:  [73-92] 85  Resp:  [16-20] 20  BP: ()/(47-74) 101/48  Physical Exam  GEN: Awake, alert, interactive, in NAD  HEENT:  PERRLA, EOMI, Anicteric, Trachea midline, +bipap  Lungs: diminished bs b/l but improved air flow from yesterday, min end exp wheezing  Heart: RRR, +S1/s2, no rub  ABD: soft, nt/nd, +BS, no guarding/rebound  Extremities: atraumatic, no cyanosis, no edema  Skin: no rashes or lesions  Neuro: AAOx3, no focal deficits  Psych: normal mood & affect        Results Review:  I have reviewed the labs, radiology results, and diagnostic studies.    Laboratory Data:   Results from last 7 days   Lab Units 06/05/20 0522 06/04/20 0319 06/03/20  1649   WBC 10*3/mm3 8.71 6.50 8.55   HEMOGLOBIN g/dL 13.3 13.8 14.9   HEMATOCRIT % 41.3 43.9 49.1   PLATELETS 10*3/mm3 97* 87* 94*        Results from last 7 days   Lab Units 06/05/20 0522 06/04/20 0319 06/03/20  1649   SODIUM mmol/L 144 140 139   POTASSIUM mmol/L 3.9 4.8 4.8   CHLORIDE mmol/L 92* 93* 92*   CO2 mmol/L 43.0* 36.0* 38.0*   BUN mg/dL 25* 16 17   CREATININE mg/dL 0.59* 0.42* 0.41*   CALCIUM mg/dL 9.4 9.3 9.4   BILIRUBIN mg/dL  --  0.5 0.4   ALK PHOS U/L  --  136* 145*   ALT (SGPT) U/L  --  25  27   AST (SGOT) U/L  --  26 31   GLUCOSE mg/dL 158* 172* 113*       Culture Data:   Blood Culture   Date Value Ref Range Status   06/03/2020 No growth at 24 hours  Preliminary   06/03/2020 No growth at 24 hours  Preliminary       Radiology Data:   Imaging Results (Last 24 Hours)     ** No results found for the last 24 hours. **          I have reviewed the patient's current medications.     Assessment/Plan     Active Hospital Problems    Diagnosis   • **Acute on chronic respiratory failure with hypoxia and hypercapnia (CMS/HCC)   • CHF (congestive heart failure) (CMS/HCC)   • Pneumonia   • Cirrhosis (CMS/HCC)   • Tobacco abuse   • COPD (chronic obstructive pulmonary disease) (CMS/[facility])       #1 acute on chronic respiratory failure with hypoxia and hypercapnia -suspect this COPD exacerbation and possible heart failure exacerbation.  Afebrile without a white count.  Procalcitonin negative.  Cultures negative to this point.  Okay to complete antibiotics today and will monitor off.  Continue diuretics, steroids, nebulizers.  BiPAP can be PRN at this point.  Will give a dose of Diamox given some alkalotic contraction.    #2 acute on chronic diastolic heart failure -see diuretics for now.  Monitor urine output and renal function closely.    #3 history of cirrhosis -no signs of encephalopathy currently.  Her labs normal.    #4 COPD without exacerbation -as above    #5 hyperglycemia -the setting of high-dose steroids.  Mild.  No history of diabetes.  Will check an A1c.      Discharge Planning: I expect the patient to be discharged to home in 2 to 3 days.    Joel Rosado DO   06/05/20   09:16

## 2020-06-05 NOTE — PLAN OF CARE
Problem: Patient Care Overview  Goal: Plan of Care Review  Outcome: Ongoing (interventions implemented as appropriate)  Flowsheets (Taken 6/5/2020 9065)  Progress: no change  Plan of Care Reviewed With: patient  Outcome Summary: Pt has been resting well today. IV steriods, abx, and lasix, otherwise IID. Voiding per urinal. BSC, BM today. Up with assist x1. VSS, cont to monitor.

## 2020-06-05 NOTE — PLAN OF CARE
Problem: Patient Care Overview  Goal: Plan of Care Review  Outcome: Ongoing (interventions implemented as appropriate)  Flowsheets (Taken 6/5/2020 7702)  Progress: improving  Plan of Care Reviewed With: patient  Outcome Summary: A&O X 4. No c/o pain. Pt is slow to respond at times. Pt wasn't happy with his bed during the night, and had to be adjusted many times. BIPAP worn all night. 2L O2 when it was off. PRN xanax given x 1. Pt slept for maybe 2 hours. IV ABX and steroids continue. VSS. Safety maintained. Will continue to monitor.

## 2020-06-05 NOTE — PLAN OF CARE
Problem: Patient Care Overview  Goal: Plan of Care Review  Outcome: Ongoing (interventions implemented as appropriate)  Flowsheets (Taken 6/5/2020 1305)  Plan of Care Reviewed With: other (see comments) (RN)  Outcome Summary: Ntn consult due to reported unintentional wt loss 10lbs or more in the past 2 months and reduced appetite. Cardiac diet, oral itnake not available at this time. Unable to speak with pt at this time. Boost Plus added daily. Cont to follow for ntn plan of care.

## 2020-06-05 NOTE — PROGRESS NOTES
"Pharmacy Dosing Service  Pharmacokinetics  Vancomycin Follow-up Evaluation    Assessment/Action/Plan:  6/5/2020 1052 vancomycin trough = 17.6 (~11.3 hours post 1250mg dose) SCr = 0.59 (up from 0.42) Vancomycin dose adjusted to 1000mg iv q12h with nxt dose scheduled at 1530.  MRSA PCR screen / swab negative.  Pharmacy will continue to monitor renal function and adjust dose accordingly.     Subjective:  Elder Mina is a 69 y.o. male currently on Vancomycin for the treatment of pneumonia, day 2 of therapy (Current vancomycin end date: 6/13/2020).    Objective:  Ht: 175.3 cm (69\"); Wt: 62 kg (136 lb 11.2 oz)  Estimated Creatinine Clearance: 75.3 mL/min (A) (by C-G formula based on SCr of 0.59 mg/dL (L)).   Lab Results   Component Value Date    CREATININE 0.59 (L) 06/05/2020    CREATININE 0.42 (L) 06/04/2020    CREATININE 0.41 (L) 06/03/2020    CREATININE 0.80 11/06/2019      Lab Results   Component Value Date    WBC 8.71 06/05/2020    WBC 6.50 06/04/2020    WBC 8.55 06/03/2020         Lab Results   Component Value Date    VANCOTROUGH 17.60 06/05/2020       Culture Results:  Microbiology Results (last 10 days)       Procedure Component Value - Date/Time    Respiratory Culture - Sputum, Cough [[id number]] Collected:  06/04/20 0519    Lab Status:  Preliminary result Specimen:  Sputum from Cough Updated:  06/05/20 1104     Respiratory Culture Heavy growth (4+) Normal Respiratory Lc     Gram Stain Greater than 25 WBCs per low power field      Moderate (3+) Epithelial cells per low power field      Many (4+) Mixed gram positive lc      Few (2+) Gram negative bacilli    MRSA Screen, PCR (Inpatient) - Swab, Nares [[id number]]  (Normal) Collected:  06/04/20 0519    Lab Status:  Final result Specimen:  Swab from Nares Updated:  06/04/20 0739     MRSA PCR No MRSA Detected    Legionella Antigen, Urine - Urine, Urine, Clean Catch [[id number]]  (Normal) Collected:  06/04/20 5371    Lab Status:  Final result Specimen:  " Urine, Clean Catch Updated:  06/04/20 0542     LEGIONELLA ANTIGEN, URINE Negative    S. Pneumo Ag Urine or CSF - Urine, Urine, Clean Catch [[id number]]  (Normal) Collected:  06/04/20 0445    Lab Status:  Final result Specimen:  Urine, Clean Catch Updated:  06/04/20 0542     Strep Pneumo Ag Negative    Blood Culture - Blood, Arm, Left [[id number]] Collected:  06/03/20 2050    Lab Status:  Preliminary result Specimen:  Blood from Arm, Left Updated:  06/04/20 2331     Blood Culture No growth at 24 hours    Respiratory Panel, PCR - Swab, Nasopharynx [[id number]]  (Normal) Collected:  06/03/20 2008    Lab Status:  Final result Specimen:  Swab from Nasopharynx Updated:  06/03/20 2106     ADENOVIRUS, PCR Not Detected     Coronavirus 229E Not Detected     Coronavirus HKU1 Not Detected     Coronavirus NL63 Not Detected     Coronavirus OC43 Not Detected     Human Metapneumovirus Not Detected     Human Rhinovirus/Enterovirus Not Detected     Influenza B PCR Not Detected     Parainfluenza Virus 1 Not Detected     Parainfluenza Virus 2 Not Detected     Parainfluenza Virus 3 Not Detected     Parainfluenza Virus 4 Not Detected     Bordetella pertussis pcr Not Detected     Influenza A H1 2009 PCR Not Detected     Chlamydophila pneumoniae PCR Not Detected     Mycoplasma pneumo by PCR Not Detected     Influenza A PCR Not Detected     Influenza A H3 Not Detected     Influenza A H1 Not Detected     RSV, PCR Not Detected     Bordetella parapertussis PCR Not Detected    Narrative:       The coronavirus on the RVP is NOT COVID-19 and is NOT indicative of infection with COVID-19.     COVID-19, [facility]OTT IN-HOUSE,NP Swab (NO TRANSPORT MEDIA) 2 HR TAT - Swab, Nasopharynx [[id number]]  (Normal) Collected:  06/03/20 2008    Lab Status:  Final result Specimen:  Swab from Nasopharynx Updated:  06/03/20 2045     COVID19 Not Detected    Blood Culture - Blood, Arm, Right [[id number]] Collected:  06/03/20 1649    Lab Status:  Preliminary result Specimen:   Blood from Arm, Right Updated:  06/04/20 2115     Blood Culture No growth at 24 hours            Terry Looney, PharmD   06/05/20 12:09

## 2020-06-05 NOTE — PROGRESS NOTES
RT Nebulizer Protocol    Assessment tool to be used for patients with existing breathing treatments ordered by hospitalists                                                                  0  1  2  3  4      Respiratory History   No Smoking    Smoking History      1 Pack/Day      Pulmonary Disease   x   Exacerbation        Respiratory Rate   Normal   x   20-25      Dyspneic      Accessory Muscles      Severe Dyspnea        Breath Sounds   Clear      Crackles      Crackles/ Rhonchi      Wheezing      Absent/ Severe Wheezing        Chest   X-ray   Clear   x   1 Lobe Infiltration/ Consolidation/ PE      2 Lobe Same Lung Infiltration/ Consolidation/ PE       2 Lobe Infiltration/ Both Lungs/ Consolidation/ PE      Both Lungs/ More Than 1 Lobe/ Atelectasis/ Consolidation/  PE        Cough   Strong Non- Productive   x   Excessive Secretions/ Strong Cough      Excessive Secretions/ Weak Cough      Thick Bronchial Secretions/ Weak Cough      Thick Bronchial Secretions/ No Cough        Total Patient Score =  3  0-4=Q4 PRN  5-9=TID and Q4 PRN  10-14=QID and Q3 PRN  15-19=Q4 and Q2 PRN  20=Q3 and Q2 PRN    Bronchopulmonary Hygiene (CPT)   Q4 ATC Copious secretions, dyspnea, unable to sleep, mucus plug    QID & Q4 PRN Moderate secretions x   TID Small amounts of secretions w/ poor cough and history of secretions    BID Unable to deep breathe and cough spontaneously       Lung Expansion Therapy (PEP)   Q4 & PRN at night Severe atelectasis, poor oxygenation    QID  High risk for persistent atelectasis, existence of same    TID At risk for developing atelectasis    BID Unable to deep breathe and cough spontaneously     Instruct, 1 follow up Patients able to perform well on their own

## 2020-06-05 NOTE — PROGRESS NOTES
RT Nebulizer Protocol    Assessment tool to be used for patients with existing breathing treatments ordered by hospitalists                                                                  0  1  2  3  4      Respiratory History   No Smoking      Smoking History      1 Pack/Day      Pulmonary Disease   x   Exacerbation        Respiratory Rate   Normal   x   20-25      Dyspneic      Accessory Muscles      Severe Dyspnea        Breath Sounds   Clear      Crackles   x   Crackles/ Rhonchi      Wheezing      Absent/ Severe Wheezing        Chest   X-ray   Clear      1 Lobe Infiltration/ Consolidation/ PE      2 Lobe Same Lung Infiltration/ Consolidation/ PE    x   2 Lobe Infiltration/ Both Lungs/ Consolidation/ PE      Both Lungs/ More Than 1 Lobe/ Atelectasis/ Consolidation/  PE        Cough   Strong Non- Productive   x   Excessive Secretions/ Strong Cough      Excessive Secretions/ Weak Cough      Thick Bronchial Secretions/ Weak Cough      Thick Bronchial Secretions/ No Cough        Total Patient Score =  6  0-4=Q4 PRN  5-9=TID and Q4 PRN  10-14=QID and Q3 PRN  15-19=Q4 and Q2 PRN  20=Q3 and Q2 PRN    Bronchopulmonary Hygiene (CPT)   Q4 ATC Copious secretions, dyspnea, unable to sleep, mucus plug    QID & Q4 PRN Moderate secretions    TID Small amounts of secretions w/ poor cough and history of secretions    BID Unable to deep breathe and cough spontaneously       Lung Expansion Therapy (PEP)   Q4 & PRN at night Severe atelectasis, poor oxygenation    QID  High risk for persistent atelectasis, existence of same    TID At risk for developing atelectasis    BID Unable to deep breathe and cough spontaneously     Instruct, 1 follow up Patients able to perform well on their own      Pt scored 6, Changed duonebs to QID and Q4prn per protocol

## 2020-06-05 NOTE — PROGRESS NOTES
Continued Stay Note   [facility]     Patient Name: Elder Mina  MRN: [id number]  Today's Date: 6/5/2020    Admit Date: 6/3/2020    Discharge Plan     Row Name 06/05/20 1056       Plan    Plan  Home    Patient/Family in Agreement with Plan  yes    Plan Comments  Pt will return home at d/c. He lives with his spouse and denies needs.         Discharge Codes    No documentation.             BRITTNY Conner

## 2020-06-06 LAB
ANION GAP SERPL CALCULATED.3IONS-SCNC: 10 MMOL/L (ref 5–15)
BUN BLD-MCNC: 29 MG/DL (ref 8–23)
BUN/CREAT SERPL: 46.8 (ref 7–25)
CALCIUM SPEC-SCNC: 9.6 MG/DL (ref 8.6–10.5)
CHLORIDE SERPL-SCNC: 92 MMOL/L (ref 98–107)
CO2 SERPL-SCNC: 41 MMOL/L (ref 22–29)
CREAT BLD-MCNC: 0.62 MG/DL (ref 0.76–1.27)
GFR SERPL CREATININE-BSD FRML MDRD: 128 ML/MIN/1.73
GLUCOSE BLD-MCNC: 139 MG/DL (ref 65–99)
HBA1C MFR BLD: 6 % (ref 4.8–5.6)
MAGNESIUM SERPL-MCNC: 2.3 MG/DL (ref 1.6–2.4)
POTASSIUM BLD-SCNC: 3.8 MMOL/L (ref 3.5–5.2)
SODIUM BLD-SCNC: 143 MMOL/L (ref 136–145)

## 2020-06-06 PROCEDURE — 25010000002 ENOXAPARIN PER 10 MG: Performed by: INTERNAL MEDICINE

## 2020-06-06 PROCEDURE — 94799 UNLISTED PULMONARY SVC/PX: CPT

## 2020-06-06 PROCEDURE — 83036 HEMOGLOBIN GLYCOSYLATED A1C: CPT | Performed by: INTERNAL MEDICINE

## 2020-06-06 PROCEDURE — 80048 BASIC METABOLIC PNL TOTAL CA: CPT | Performed by: INTERNAL MEDICINE

## 2020-06-06 PROCEDURE — 83735 ASSAY OF MAGNESIUM: CPT | Performed by: INTERNAL MEDICINE

## 2020-06-06 PROCEDURE — 25010000002 METHYLPREDNISOLONE PER 125 MG: Performed by: INTERNAL MEDICINE

## 2020-06-06 PROCEDURE — 25010000002 FUROSEMIDE PER 20 MG: Performed by: INTERNAL MEDICINE

## 2020-06-06 PROCEDURE — 25010000002 METHYLPREDNISOLONE PER 40 MG: Performed by: INTERNAL MEDICINE

## 2020-06-06 RX ORDER — METHYLPREDNISOLONE SODIUM SUCCINATE 40 MG/ML
40 INJECTION, POWDER, LYOPHILIZED, FOR SOLUTION INTRAMUSCULAR; INTRAVENOUS EVERY 6 HOURS
Status: DISCONTINUED | OUTPATIENT
Start: 2020-06-06 | End: 2020-06-06

## 2020-06-06 RX ORDER — IPRATROPIUM BROMIDE AND ALBUTEROL SULFATE 2.5; .5 MG/3ML; MG/3ML
3 SOLUTION RESPIRATORY (INHALATION)
Status: DISCONTINUED | OUTPATIENT
Start: 2020-06-06 | End: 2020-06-07

## 2020-06-06 RX ORDER — METHYLPREDNISOLONE SODIUM SUCCINATE 40 MG/ML
40 INJECTION, POWDER, LYOPHILIZED, FOR SOLUTION INTRAMUSCULAR; INTRAVENOUS EVERY 8 HOURS
Status: COMPLETED | OUTPATIENT
Start: 2020-06-06 | End: 2020-06-07

## 2020-06-06 RX ORDER — FUROSEMIDE 40 MG/1
40 TABLET ORAL DAILY
Status: DISCONTINUED | OUTPATIENT
Start: 2020-06-07 | End: 2020-06-06

## 2020-06-06 RX ADMIN — SODIUM CHLORIDE, PRESERVATIVE FREE 10 ML: 5 INJECTION INTRAVENOUS at 20:03

## 2020-06-06 RX ADMIN — ENOXAPARIN SODIUM 40 MG: 40 INJECTION SUBCUTANEOUS at 09:40

## 2020-06-06 RX ADMIN — BUDESONIDE AND FORMOTEROL FUMARATE DIHYDRATE 2 PUFF: 160; 4.5 AEROSOL RESPIRATORY (INHALATION) at 06:53

## 2020-06-06 RX ADMIN — GUAIFENESIN 600 MG: 600 TABLET, EXTENDED RELEASE ORAL at 09:40

## 2020-06-06 RX ADMIN — ALPRAZOLAM 1 MG: 0.5 TABLET ORAL at 10:08

## 2020-06-06 RX ADMIN — SODIUM CHLORIDE, PRESERVATIVE FREE 10 ML: 5 INJECTION INTRAVENOUS at 09:40

## 2020-06-06 RX ADMIN — IPRATROPIUM BROMIDE AND ALBUTEROL SULFATE 3 ML: 2.5; .5 SOLUTION RESPIRATORY (INHALATION) at 14:29

## 2020-06-06 RX ADMIN — GUAIFENESIN 600 MG: 600 TABLET, EXTENDED RELEASE ORAL at 20:00

## 2020-06-06 RX ADMIN — METHYLPREDNISOLONE SODIUM SUCCINATE 40 MG: 40 INJECTION, POWDER, FOR SOLUTION INTRAMUSCULAR; INTRAVENOUS at 20:02

## 2020-06-06 RX ADMIN — ALPRAZOLAM 1 MG: 0.5 TABLET ORAL at 22:06

## 2020-06-06 RX ADMIN — ACETAMINOPHEN 650 MG: 325 TABLET, FILM COATED ORAL at 23:06

## 2020-06-06 RX ADMIN — NICOTINE 1 PATCH: 21 PATCH, EXTENDED RELEASE TRANSDERMAL at 03:32

## 2020-06-06 RX ADMIN — IPRATROPIUM BROMIDE AND ALBUTEROL SULFATE 3 ML: 2.5; .5 SOLUTION RESPIRATORY (INHALATION) at 06:53

## 2020-06-06 RX ADMIN — METHYLPREDNISOLONE SODIUM SUCCINATE 40 MG: 40 INJECTION, POWDER, FOR SOLUTION INTRAMUSCULAR; INTRAVENOUS at 14:32

## 2020-06-06 RX ADMIN — METHYLPREDNISOLONE SODIUM SUCCINATE 60 MG: 125 INJECTION, POWDER, FOR SOLUTION INTRAMUSCULAR; INTRAVENOUS at 09:40

## 2020-06-06 RX ADMIN — FUROSEMIDE 40 MG: 10 INJECTION, SOLUTION INTRAMUSCULAR; INTRAVENOUS at 03:31

## 2020-06-06 RX ADMIN — METHYLPREDNISOLONE SODIUM SUCCINATE 60 MG: 125 INJECTION, POWDER, FOR SOLUTION INTRAMUSCULAR; INTRAVENOUS at 03:30

## 2020-06-06 RX ADMIN — IPRATROPIUM BROMIDE AND ALBUTEROL SULFATE 3 ML: 2.5; .5 SOLUTION RESPIRATORY (INHALATION) at 19:52

## 2020-06-06 NOTE — PROGRESS NOTES
[facility] Medicine Services  INPATIENT PROGRESS NOTE    Patient Name: Elder Mina  Date of Admission: 6/3/2020  Today's Date: 06/06/20  Length of Stay: 2  Primary Care Physician: Darvin Badillo MD    Subjective   Chief Complaint: Follow-up shortness of breath    HPI   Patient seen and examined.  Currently sitting on the edge of his bed.  Looks comfortable.  Sats about 88% on the monitor.  Says he feels much better though.  Says he is ambulate in the walker walking pretty long distance without having to stop.  Wife confirms this and says he is look better than he has in several weeks.  He denies any chest pain.  No cough or sputum.  No fevers or chills.  Urinating well with diuretics.      ROS:  All pertinent negatives and positives are as above. All other systems have been reviewed and are negative unless otherwise stated.     Objective    Temp:  [97.4 °F (36.3 °C)-98.2 °F (36.8 °C)] 97.7 °F (36.5 °C)  Heart Rate:  [54-94] 76  Resp:  [14-20] 18  BP: (101-116)/(48-64) 113/62  Physical Exam  GEN: Awake, alert, interactive, in NAD  HEENT:  EOMI, Anicteric, Trachea midline, +bipap  Lungs: diminished bs persists, still some end exp wheezing, no rales  Heart: RRR, +S1/s2, no rub  ABD: soft, nt/nd, +BS, no guarding/rebound  Extremities: atraumatic, no cyanosis, no edema  Skin: no rashes or lesions  Neuro: AAOx3, no focal deficits  Psych: normal mood & affect        Results Review:  I have reviewed the labs, radiology results, and diagnostic studies.    Laboratory Data:   Results from last 7 days   Lab Units 06/05/20  0522 06/04/20  0319 06/03/20  1649   WBC 10*3/mm3 8.71 6.50 8.55   HEMOGLOBIN g/dL 13.3 13.8 14.9   HEMATOCRIT % 41.3 43.9 49.1   PLATELETS 10*3/mm3 97* 87* 94*        Results from last 7 days   Lab Units 06/06/20  0330 06/05/20  0522 06/04/20  0319 06/03/20  1649   SODIUM mmol/L 143 144 140 139   POTASSIUM mmol/L 3.8 3.9 4.8 4.8   CHLORIDE mmol/L 92* 92* 93* 92*      CO2 mmol/L 41.0* 43.0* 36.0* 38.0*   BUN mg/dL 29* 25* 16 17   CREATININE mg/dL 0.62* 0.59* 0.42* 0.41*   CALCIUM mg/dL 9.6 9.4 9.3 9.4   BILIRUBIN mg/dL  --   --  0.5 0.4   ALK PHOS U/L  --   --  136* 145*   ALT (SGPT) U/L  --   --  25 27   AST (SGOT) U/L  --   --  26 31   GLUCOSE mg/dL 139* 158* 172* 113*       Culture Data:   Blood Culture   Date Value Ref Range Status   06/03/2020 No growth at 24 hours  Preliminary   06/03/2020 No growth at 24 hours  Preliminary       Radiology Data:   Imaging Results (Last 24 Hours)     ** No results found for the last 24 hours. **          I have reviewed the patient's current medications.     Assessment/Plan     Active Hospital Problems    Diagnosis   • **Acute on chronic respiratory failure with hypoxia and hypercapnia (CMS/HCC)   • CHF (congestive heart failure) (CMS/HCC)   • Pneumonia   • Cirrhosis (CMS/HCC)   • Tobacco abuse   • COPD (chronic obstructive pulmonary disease) (CMS/HCC)       #1 acute on chronic respiratory failure with hypoxia and hypercapnia -suspect this COPD exacerbation and possible fluid overload.  Echo w/ hyperdynamic EF but no overt dysfunction. Afebrile without a white count.  Procalcitonin negative.  Cultures negative to this point. Now monitoring off abx starting today. Will also d/c diuretics after this AM and monitor. Continue steroids but cut to 40 q8hrs.  Continue nebulizers. Continue to wean 02 to home baseline    #2 COPD with exacerbation -as above    #3 history of cirrhosis -no signs of encephalopathy currently.  Her labs normal.    #4 hyperglycemia -the setting of high-dose steroids.  Mild.  No history of diabetes.  a1c is 6.0. Will need pcp f/u and monitoring.     #5 thrombocytopenia - in setting of cirrhosis hx and ? Liver CA. No signs of bleeding. No need for transfusion    Discharge Planning: I expect the patient to be discharged to home in 1 to 2 days if he continues to improve and tolerates steroid weaning    Joel Rosado DO    06/06/20   07:57

## 2020-06-06 NOTE — PLAN OF CARE
Problem: Patient Care Overview  Goal: Plan of Care Review  Outcome: Ongoing (interventions implemented as appropriate)  Flowsheets (Taken 6/6/2020 1703)  Progress: improving  Plan of Care Reviewed With: patient  Outcome Summary: Pt has been resting comfortably today. PRN anxiety medication given see MAR. O2 @3.5L NC, cont pulse ox in place. Voiding, up with stand by assist to BSC. Pt states he wants to go home tomorrow.VSS, cont to monitor.

## 2020-06-06 NOTE — PROGRESS NOTES
RT Nebulizer Protocol    Assessment tool to be used for patients with existing breathing treatments ordered by hospitalists                                                                  0  1  2  3  4      Respiratory History   No Smoking      Smoking History      1 Pack/Day      Pulmonary Disease      Exacerbation   x     Respiratory Rate   Normal   x   20-25      Dyspneic      Accessory Muscles      Severe Dyspnea        Breath Sounds   Clear      Crackles      Crackles/ Rhonchi   x   Wheezing      Absent/ Severe Wheezing        Chest   X-ray   Clear      1 Lobe Infiltration/ Consolidation/ PE      2 Lobe Same Lung Infiltration/ Consolidation/ PE       2 Lobe Infiltration/ Both Lungs/ Consolidation/ PE      Both Lungs/ More Than 1 Lobe/ Atelectasis/ Consolidation/  PE        Cough   Strong Non- Productive   x   Excessive Secretions/ Strong Cough      Excessive Secretions/ Weak Cough      Thick Bronchial Secretions/ Weak Cough      Thick Bronchial Secretions/ No Cough        Total Patient Score =  6  0-4=Q4 PRN6  5-9=TID and Q4 PRN  10-14=QID and Q3 PRN  15-19=Q4 and Q2 PRN  20=Q3 and Q2 PRN    Bronchopulmonary Hygiene (CPT)   Q4 ATC Copious secretions, dyspnea, unable to sleep, mucus plug    QID & Q4 PRN Moderate secretions    TID Small amounts of secretions w/ poor cough and history of secretions    BID Unable to deep breathe and cough spontaneously       Lung Expansion Therapy (PEP)   Q4 & PRN at night Severe atelectasis, poor oxygenation    QID  High risk for persistent atelectasis, existence of same    TID At risk for developing atelectasis    BID Unable to deep breathe and cough spontaneously     Instruct, 1 follow up Patients able to perform well on their own        Frequency will be changed per protocol

## 2020-06-06 NOTE — PLAN OF CARE
Problem: Patient Care Overview  Goal: Plan of Care Review  Outcome: Ongoing (interventions implemented as appropriate)  Flowsheets (Taken 6/6/2020 0252)  Progress: improving  Plan of Care Reviewed With: patient  Outcome Summary: Pt denies pain; O2 on 3L per NC; continuous pulse ox; maintaining o2 sats so far this shift; IV abx as ordered; voiding; no other issues; will monitor.

## 2020-06-07 LAB
BACTERIA SPEC RESP CULT: ABNORMAL
BACTERIA SPEC RESP CULT: ABNORMAL
GRAM STN SPEC: ABNORMAL

## 2020-06-07 PROCEDURE — 94799 UNLISTED PULMONARY SVC/PX: CPT

## 2020-06-07 PROCEDURE — 25010000002 ENOXAPARIN PER 10 MG: Performed by: INTERNAL MEDICINE

## 2020-06-07 PROCEDURE — 25010000002 METHYLPREDNISOLONE PER 40 MG: Performed by: INTERNAL MEDICINE

## 2020-06-07 RX ORDER — IPRATROPIUM BROMIDE AND ALBUTEROL SULFATE 2.5; .5 MG/3ML; MG/3ML
3 SOLUTION RESPIRATORY (INHALATION) EVERY 4 HOURS PRN
Status: DISCONTINUED | OUTPATIENT
Start: 2020-06-07 | End: 2020-06-07

## 2020-06-07 RX ORDER — IPRATROPIUM BROMIDE AND ALBUTEROL SULFATE 2.5; .5 MG/3ML; MG/3ML
3 SOLUTION RESPIRATORY (INHALATION) EVERY 4 HOURS PRN
Status: DISCONTINUED | OUTPATIENT
Start: 2020-06-07 | End: 2020-06-08

## 2020-06-07 RX ORDER — PREDNISONE 20 MG/1
40 TABLET ORAL
Status: DISCONTINUED | OUTPATIENT
Start: 2020-06-08 | End: 2020-06-08 | Stop reason: HOSPADM

## 2020-06-07 RX ORDER — LANOLIN ALCOHOL/MO/W.PET/CERES
6 CREAM (GRAM) TOPICAL NIGHTLY PRN
Status: DISCONTINUED | OUTPATIENT
Start: 2020-06-07 | End: 2020-06-08 | Stop reason: HOSPADM

## 2020-06-07 RX ADMIN — ALPRAZOLAM 1 MG: 0.5 TABLET ORAL at 20:47

## 2020-06-07 RX ADMIN — BUDESONIDE AND FORMOTEROL FUMARATE DIHYDRATE 2 PUFF: 160; 4.5 AEROSOL RESPIRATORY (INHALATION) at 06:47

## 2020-06-07 RX ADMIN — Medication 6 MG: at 22:25

## 2020-06-07 RX ADMIN — ALPRAZOLAM 1 MG: 0.5 TABLET ORAL at 12:25

## 2020-06-07 RX ADMIN — METHYLPREDNISOLONE SODIUM SUCCINATE 40 MG: 40 INJECTION, POWDER, FOR SOLUTION INTRAMUSCULAR; INTRAVENOUS at 05:57

## 2020-06-07 RX ADMIN — GUAIFENESIN 600 MG: 600 TABLET, EXTENDED RELEASE ORAL at 08:32

## 2020-06-07 RX ADMIN — SODIUM CHLORIDE, PRESERVATIVE FREE 10 ML: 5 INJECTION INTRAVENOUS at 20:49

## 2020-06-07 RX ADMIN — GUAIFENESIN 600 MG: 600 TABLET, EXTENDED RELEASE ORAL at 20:47

## 2020-06-07 RX ADMIN — METHYLPREDNISOLONE SODIUM SUCCINATE 40 MG: 40 INJECTION, POWDER, FOR SOLUTION INTRAMUSCULAR; INTRAVENOUS at 14:28

## 2020-06-07 RX ADMIN — ENOXAPARIN SODIUM 40 MG: 40 INJECTION SUBCUTANEOUS at 08:32

## 2020-06-07 RX ADMIN — SODIUM CHLORIDE, PRESERVATIVE FREE 10 ML: 5 INJECTION INTRAVENOUS at 08:32

## 2020-06-07 RX ADMIN — IPRATROPIUM BROMIDE AND ALBUTEROL SULFATE 3 ML: 2.5; .5 SOLUTION RESPIRATORY (INHALATION) at 20:35

## 2020-06-07 RX ADMIN — METHYLPREDNISOLONE SODIUM SUCCINATE 40 MG: 40 INJECTION, POWDER, FOR SOLUTION INTRAMUSCULAR; INTRAVENOUS at 20:47

## 2020-06-07 RX ADMIN — IPRATROPIUM BROMIDE AND ALBUTEROL SULFATE 3 ML: 2.5; .5 SOLUTION RESPIRATORY (INHALATION) at 06:46

## 2020-06-07 RX ADMIN — NICOTINE 1 PATCH: 21 PATCH, EXTENDED RELEASE TRANSDERMAL at 05:58

## 2020-06-07 NOTE — PROGRESS NOTES
RT Nebulizer Protocol    Assessment tool to be used for patients with existing breathing treatments ordered by hospitalists                                                                  0  1  2  3  4      Respiratory History   No Smoking      Smoking History      1 Pack/Day      Pulmonary Disease      Exacerbation   x     Respiratory Rate   Normal   x   20-25      Dyspneic      Accessory Muscles      Severe Dyspnea        Breath Sounds   Clear/Diminshed   x   Crackles      Crackles/ Rhonchi      Wheezing      Absent/ Severe Wheezing        Chest   X-ray   Clear/Unavailable   x   1 Lobe Infiltration/ Consolidation/ PE      2 Lobe Same Lung Infiltration/ Consolidation/ PE       2 Lobe Infiltration/ Both Lungs/ Consolidation/ PE      Both Lungs/ More Than 1 Lobe/ Atelectasis/ Consolidation/  PE        Cough   Strong Non- Productive   x   Excessive Secretions/ Strong Cough      Excessive Secretions/ Weak Cough      Thick Bronchial Secretions/ Weak Cough      Thick Bronchial Secretions/ No Cough        Total Patient Score =  4  0-4=Q4 PRN  5-9=TID and Q4 PRN  10-14=QID and Q3 PRN  15-19=Q4 and Q2 PRN  20=Q3 and Q2 PRN    Bronchopulmonary Hygiene (CPT)   Q4 ATC Copious secretions, dyspnea, unable to sleep, mucus plug    QID & Q4 PRN Moderate secretions    TID Small amounts of secretions w/ poor cough and history of secretions    BID Unable to deep breathe and cough spontaneously       Lung Expansion Therapy (PEP)   Q4 & PRN at night Severe atelectasis, poor oxygenation    QID  High risk for persistent atelectasis, existence of same    TID At risk for developing atelectasis    BID Unable to deep breathe and cough spontaneously     Instruct, 1 follow up Patients able to perform well on their own        Treatment frequency will be changed per protocol.

## 2020-06-07 NOTE — PROGRESS NOTES
[facility] Medicine Services  INPATIENT PROGRESS NOTE    Patient Name: Elder Mina  Date of Admission: 6/3/2020  Today's Date: 06/07/20  Length of Stay: 3  Primary Care Physician: Darvin Badillo MD    Subjective   Chief Complaint: Follow-up shortness of breath    HPI:  Patient seen and examined.  Continues to feel better day by day.  Feels as if he is about ready to go home at this point.  Denies any overt cough or sputum.  No significant dyspnea at rest.  Sats today about 92% on nasal cannula.  Main complaint was that it was difficult for him to sleep last night.      ROS:  All pertinent negatives and positives are as above. All other systems have been reviewed and are negative unless otherwise stated.     Objective    Temp:  [97.7 °F (36.5 °C)-98.2 °F (36.8 °C)] 98.2 °F (36.8 °C)  Heart Rate:  [43-93] 76  Resp:  [16] 16  BP: (109-130)/(43-77) 109/55  Physical Exam  GEN: Awake, alert, interactive, in NAD  HEENT:  EOMI, Anicteric, Trachea midline, +bipap  Lungs: diminished bs persists, still some end exp wheezing, no rales  Heart: RRR, +S1/s2, no rub  ABD: soft, nt/nd, +BS, no guarding/rebound  Extremities: atraumatic, no cyanosis, no edema  Skin: no rashes or lesions  Neuro: AAOx3, no focal deficits  Psych: normal mood & affect        Results Review:  I have reviewed the labs, radiology results, and diagnostic studies.    Laboratory Data:   Results from last 7 days   Lab Units 06/05/20  0522 06/04/20  0319 06/03/20  1649   WBC 10*3/mm3 8.71 6.50 8.55   HEMOGLOBIN g/dL 13.3 13.8 14.9   HEMATOCRIT % 41.3 43.9 49.1   PLATELETS 10*3/mm3 97* 87* 94*        Results from last 7 days   Lab Units 06/06/20  0330 06/05/20  0522 06/04/20  0319 06/03/20  1649   SODIUM mmol/L 143 144 140 139   POTASSIUM mmol/L 3.8 3.9 4.8 4.8   CHLORIDE mmol/L 92* 92* 93* 92*   CO2 mmol/L 41.0* 43.0* 36.0* 38.0*   BUN mg/dL 29* 25* 16 17   CREATININE mg/dL 0.62* 0.59* 0.42* 0.41*   CALCIUM mg/dL 9.6  9.4 9.3 9.4   BILIRUBIN mg/dL  --   --  0.5 0.4   ALK PHOS U/L  --   --  136* 145*   ALT (SGPT) U/L  --   --  25 27   AST (SGOT) U/L  --   --  26 31   GLUCOSE mg/dL 139* 158* 172* 113*       Culture Data:   Blood Culture   Date Value Ref Range Status   06/03/2020 No growth at 24 hours  Preliminary   06/03/2020 No growth at 24 hours  Preliminary       Radiology Data:   Imaging Results (Last 24 Hours)     ** No results found for the last 24 hours. **          I have reviewed the patient's current medications.     Assessment/Plan     Active Hospital Problems    Diagnosis   • **Acute on chronic respiratory failure with hypoxia and hypercapnia (CMS/HCC)   • CHF (congestive heart failure) (CMS/HCC)   • Pneumonia   • Cirrhosis (CMS/HCC)   • Tobacco abuse   • COPD (chronic obstructive pulmonary disease) (CMS/HCC)       #1 acute on chronic respiratory failure with hypoxia and hypercapnia -suspect this COPD exacerbation and possible fluid overload.  Echo w/ hyperdynamic EF but no overt dysfunction. Afebrile without a white count.  Procalcitonin negative.  Cultures negative to this point. Now monitoring off abx starting 6/6.  Diuretics also completed 6/6.  Attempted aggressive steroids cut to 40 q8hrs 4 today.  Despite this patient persistently has poor air entry and expiratory wheezing.  Still sounds tight.  Some of this may be baseline but I have no frame of reference to his chronic lung exam.  Feel he would do better with 1 more night here to complete IV steroids.  Start oral steroids tomorrow morning.  Possible home tomorrow.  Continue nebulizers.  Also feel part of his reasons for him to go home or poor sleep here.  Will trial melatonin tonight.    #2 COPD with exacerbation -as above    #3 history of cirrhosis -no signs of encephalopathy currently.  LFTs normal    #4 hyperglycemia -the setting of high-dose steroids.  Mild.  No history of diabetes.  a1c is 6.0. Will need pcp f/u and monitoring.     #5 thrombocytopenia - in  setting of cirrhosis hx and ? Liver CA. No signs of bleeding. No need for transfusion    Discharge Planning: I expect the patient to be discharged to home tomorrow if stable and continues to improve    Joel Rosado DO   06/07/20   07:57

## 2020-06-07 NOTE — PLAN OF CARE
Problem: Patient Care Overview  Goal: Plan of Care Review  Outcome: Ongoing (interventions implemented as appropriate)  Flowsheets (Taken 6/7/2020 0259)  Progress: no change  Plan of Care Reviewed With: patient  Outcome Summary: Pt complained of anxiety, see MAR. He hasnt been able to get much sleep tonight. Cont pulse ox. NC @3.5L. Tele. VSS. Will cont to monitor.

## 2020-06-07 NOTE — PROGRESS NOTES
RT Nebulizer Protocol    Assessment tool to be used for patients with existing breathing treatments ordered by hospitalists                                                                  0  1  2  3  4      Respiratory History   No Smoking      Smoking History      1 Pack/Day      Pulmonary Disease   x   Exacerbation        Respiratory Rate   Normal   x   20-25      Dyspneic      Accessory Muscles      Severe Dyspnea        Breath Sounds   Clear/Diminished   x   Crackles      Crackles/ Rhonchi      Wheezing      Absent/ Severe Wheezing        Chest   X-ray   Clear/Unavailable   x   1 Lobe Infiltration/ Consolidation/ PE      2 Lobe Same Lung Infiltration/ Consolidation/ PE       2 Lobe Infiltration/ Both Lungs/ Consolidation/ PE      Both Lungs/ More Than 1 Lobe/ Atelectasis/ Consolidation/  PE        Cough   Strong Non- Productive   x   Excessive Secretions/ Strong Cough      Excessive Secretions/ Weak Cough      Thick Bronchial Secretions/ Weak Cough      Thick Bronchial Secretions/ No Cough        Total Patient Score =  3  0-4=Q4 PRN  5-9=TID and Q4 PRN  10-14=QID and Q3 PRN  15-19=Q4 and Q2 PRN  20=Q3 and Q2 PRN    Bronchopulmonary Hygiene (CPT)   Q4 ATC Copious secretions, dyspnea, unable to sleep, mucus plug     Moderate secretions    TID Small amounts of secretions w/ poor cough and history of secretions    BID Unable to deep breathe and cough spontaneously       Lung Expansion Therapy (PEP)   Q4 & PRN at night Severe atelectasis, poor oxygenation    QID  High risk for persistent atelectasis, existence of same    TID At risk for developing atelectasis    BID Unable to deep breathe and cough spontaneously     Instruct, 1 follow up Patients able to perform well on their own    Patient has been assessed and no changes will be made.

## 2020-06-08 VITALS
WEIGHT: 134.2 LBS | HEIGHT: 69 IN | HEART RATE: 60 BPM | BODY MASS INDEX: 19.88 KG/M2 | RESPIRATION RATE: 12 BRPM | OXYGEN SATURATION: 97 % | DIASTOLIC BLOOD PRESSURE: 65 MMHG | SYSTOLIC BLOOD PRESSURE: 113 MMHG | TEMPERATURE: 97.4 F

## 2020-06-08 LAB
BACTERIA SPEC AEROBE CULT: NORMAL
BACTERIA SPEC AEROBE CULT: NORMAL

## 2020-06-08 PROCEDURE — 94799 UNLISTED PULMONARY SVC/PX: CPT

## 2020-06-08 PROCEDURE — 94640 AIRWAY INHALATION TREATMENT: CPT

## 2020-06-08 PROCEDURE — 25010000002 ENOXAPARIN PER 10 MG: Performed by: INTERNAL MEDICINE

## 2020-06-08 PROCEDURE — 63710000001 PREDNISONE PER 1 MG: Performed by: INTERNAL MEDICINE

## 2020-06-08 RX ORDER — IPRATROPIUM BROMIDE AND ALBUTEROL SULFATE 2.5; .5 MG/3ML; MG/3ML
3 SOLUTION RESPIRATORY (INHALATION)
Status: DISCONTINUED | OUTPATIENT
Start: 2020-06-08 | End: 2020-06-08 | Stop reason: HOSPADM

## 2020-06-08 RX ORDER — IPRATROPIUM BROMIDE AND ALBUTEROL SULFATE 2.5; .5 MG/3ML; MG/3ML
3 SOLUTION RESPIRATORY (INHALATION)
Qty: 360 ML | Refills: 5 | Status: SHIPPED | OUTPATIENT
Start: 2020-06-08

## 2020-06-08 RX ORDER — GUAIFENESIN 600 MG/1
600 TABLET, EXTENDED RELEASE ORAL EVERY 12 HOURS SCHEDULED
Qty: 60 TABLET | Refills: 2 | Status: SHIPPED | OUTPATIENT
Start: 2020-06-08

## 2020-06-08 RX ORDER — METHYLPREDNISOLONE 4 MG/1
TABLET ORAL
Qty: 21 TABLET | Refills: 0 | Status: SHIPPED | OUTPATIENT
Start: 2020-06-08 | End: 2020-06-17

## 2020-06-08 RX ADMIN — NICOTINE 1 PATCH: 21 PATCH, EXTENDED RELEASE TRANSDERMAL at 04:50

## 2020-06-08 RX ADMIN — IPRATROPIUM BROMIDE AND ALBUTEROL SULFATE 3 ML: 2.5; .5 SOLUTION RESPIRATORY (INHALATION) at 13:11

## 2020-06-08 RX ADMIN — PREDNISONE 40 MG: 20 TABLET ORAL at 08:30

## 2020-06-08 RX ADMIN — SODIUM CHLORIDE, PRESERVATIVE FREE 10 ML: 5 INJECTION INTRAVENOUS at 08:30

## 2020-06-08 RX ADMIN — ENOXAPARIN SODIUM 40 MG: 40 INJECTION SUBCUTANEOUS at 08:30

## 2020-06-08 RX ADMIN — GUAIFENESIN 600 MG: 600 TABLET, EXTENDED RELEASE ORAL at 08:30

## 2020-06-08 RX ADMIN — BUDESONIDE AND FORMOTEROL FUMARATE DIHYDRATE 2 PUFF: 160; 4.5 AEROSOL RESPIRATORY (INHALATION) at 06:41

## 2020-06-08 NOTE — PROGRESS NOTES
Continued Stay Note  [facility]     Patient Name: Elder Mina  MRN: [id number]  Today's Date: 6/8/2020    Admit Date: 6/3/2020    Discharge Plan     Row Name 06/08/20 1426       Plan    Plan  Home    Patient/Family in Agreement with Plan  yes    Final Discharge Disposition Code  01 - home or self-care    Final Note  Pt is going home today. All documentation and rx signature has been sent to [facility] at [phone]. Spoke with Bill [phone] from [employer] and they will try to have it delivered to the pt's home tonight or tomorrow. Pt also has an order for a home nebulizer and they will pick it up at [facility]. Pt did ask about changing his O2 provider to [facility]. Informed them that they will have to fill out a change of provider form and be retested for oxygen and it would not be able to happen today. They are going to contact [facility] and his MD to start working on.     Row Name 06/08/20 1137       Plan    Plan  Home    Patient/Family in Agreement with Plan  yes    Plan Comments  Rec'd orders for a trilogy machine for pt. Spoke with him and his spouse. He uses [facility] for his oxygen. Spoke with [facility] [phone] and faxed them the information to [phone]. There is still documentation needed but Dr. Mas is aware.         Discharge Codes    No documentation.       Expected Discharge Date and Time     Expected Discharge Date Expected Discharge Time    Jun 8, 2020             BRITTNY Conner

## 2020-06-08 NOTE — NURSING NOTE
Tele monitor and cont pulse ox discontinued per patient request, while awaiting DC papers.    Janene Badillo RN  13:39

## 2020-06-08 NOTE — PROGRESS NOTES
Continued Stay Note   [facility]     Patient Name: Elder Mina  MRN: [id number]  Today's Date: 6/8/2020    Admit Date: 6/3/2020    Discharge Plan     Row Name 06/08/20 1137       Plan    Plan  Home    Patient/Family in Agreement with Plan  yes    Plan Comments  Rec'd orders for a trilogy machine for pt. Spoke with him and his spouse. He uses [facility] for his oxygen. Spoke with [employer] [phone] and faxed them the information to [phone]. There is still documentation needed but Dr. Mas is aware.         Discharge Codes    No documentation.             BRITTNY Conner

## 2020-06-08 NOTE — PLAN OF CARE
Problem: Patient Care Overview  Goal: Plan of Care Review  Outcome: Ongoing (interventions implemented as appropriate)  Flowsheets (Taken 6/8/2020 0330)  Plan of Care Reviewed With: patient  Note:   Pt denies pain. Complained of anxiety x1, see MAR. Melatonin given last night as requested. NC @ 3.5L. Cont pulse ox. Tele. Voiding. VSS. Will cont to monitor.

## 2020-06-08 NOTE — DISCHARGE SUMMARY
[facility] Medicine Services  DISCHARGE SUMMARY       Date of Admission: 6/3/2020  Date of Discharge:  6/8/2020  Primary Care Physician: Darvin Badillo MD    Presenting Problem/History of Present Illness:  Acute hypoxemic respiratory failure (CMS/HCC) [J96.01]     Final Discharge Diagnoses:  Active Hospital Problems    Diagnosis   • **Acute on chronic respiratory failure with hypoxia and hypercapnia (CMS/HCC)   • CHF (congestive heart failure) (CMS/HCC)   • Pneumonia   • Cirrhosis (CMS/HCC)   • Tobacco abuse   • COPD (chronic obstructive pulmonary disease) (CMS/HCC)         Pertinent Test Results:  IMPRESSION: CTA of the chest.  1. There is no evidence of embolic disease.  2. Bronchial wall thickening and bibasilar reticulonodular infiltrates  most likely representing infectious process.  3. Vascular calcifications noted in the coronary arteries  4. Small volume ascites..     IMPRESSION: Chest x-ray.  1. Bilateral interstitial and airspace opacities could be due to  atypical infection or interstitial/pulmonary edema on a background of  Emphysema.    Interpretation Summary echocardiogram    · Left ventricular systolic function is hyperdynamic (EF > 70).  · Right ventricular cavity is mildly dilated but appears to have normal systolic function.  · No significant (greater than mild) valvular pathology.        Chief Complaint on Day of Discharge: none    History of Present Illness on Day of Discharge:   Patient is a 69-year-old white male past medical history of bladder cancer as well as hepatocellular carcinoma and cirrhosis as well as COPD.  He presents to the emergency room with about a 2-week or more history of worsening shortness of breath cough congestion.  He denies fevers.  He states he has had a little bit of sputum production but not much no color to it.  Approximately 4 weeks ago patient states that he had severe swelling in his lower extremities for which she was  prescribed diuretics.  He states the swelling went away and he stopped the diuretics however it sounds like shortly after that is when he started having the breathing problems.  He has been having evaluation in the emergency room ABG shows acute on chronic respiratory failure he is on home oxygen with a PCO2 of 94 PO2 of 61 on 4 L is pH interestingly enough is 7.31.  He is been placed on BiPAP and gotten his CO2 down to 85 he is tolerating it well sats are 94%.  Patient's x-rays show bilateral interstitial and airspace opacities possibly due to atypical infection or interstitial/pulmonary edema on the background of emphysema.  Endobronchial infectious process as well as emphysema and bronchitis.  He also has an elevated BNP.  His Covid swab was negative.  He is being admitted for further evaluation management of respiratory failure probably multifactorial.  I have a concern if he also has some underlying CHF or if his BNP is elevated simply because of right heart strain from COPD.    Hospital Course:  The patient is a 69 y.o. male who presented to [facility] with COPD exacerbation/respiratory failure with hypercapnia and hypoxia/CHF/cirrhosis of liver/chronic smoker.      Copd exacerbation acute on chronic respiratory failure with hypercapnia and hypoxia./Pneumonia.  Blood culture has been negative.  Status post antibiotic biotics for last 6 days.  Patient is currently off antibiotics.  Solu-Medrol x5 days.  Plan to start oral steroids today.  Pt to continue duo nebs, Symbicort,  guaifenesin.   Acute on chronic respiratory failure secondary to COPD.  Target volume ventilation needed to maintain adequate CO2 level, home BiPAP insufficient.  Patient will need to go home with a trilogy machine.  Patient continue home oxygen at 2 to 3 L.  Patient go home with nebulizer machine.     CHF.  Possible fluid overload.  Status post diuresis for last 6 days.  Echocardiogram-Left ventricular systolic function is  "hyperdynamic (EF > 70), right ventricular cavity is mildly dilated but appears to have normal systolic function, no significant (greater than mild) valvular pathology.     Cirrhosis of liver.  Liver function normal.  No signs of encephalopathy.     Tobacco abuse.  Nicotine patch.  Discussed patient cutting back and stop smoking.     Hyperglycemia secondary to steroid.  Hemoglobin A1 C 6.1.     Thrombopenia.  Secondary to cirrhosis.  No sign of acute bleed.     Anxiety.  Xanax as needed.     Diarrhea.  Imodium PRN.     Nausea.  Zofran PRN.     Lovenox prophylaxis.     Nutrition.  Regular cardiac diet with dietary boost supplement.     Respiratory culture-Streptococcus, Beta Hemolytic.  Legionella antigen-negative.  MRSA screening-negative.  Strep pneumo antigen-negative.  Blood culture-no growth in 4 days.     Vital signs stable, afebrile.  Will discharge home with family.  Patient refused home health.  Patient refused to stop smoking.  Patient stated he has nicotine patch at home if he needs it.  Patient go home with a trilogy.  Patient home with nebulizers.  Follow with primary care doctor 1 week.    Condition on Discharge:  stable    Physical Exam on Discharge:  /65 (BP Location: Right arm, Patient Position: Sitting)   Pulse 86   Temp 97.4 °F (36.3 °C) (Oral)   Resp 16   Ht 175.3 cm (69\")   Wt 60.9 kg (134 lb 3.2 oz)   SpO2 (!) 88%   BMI 19.82 kg/m²   Physical Exam   Constitutional: He is oriented to person, place, and time. He appears well-developed.   Cachectic.   HENT:   Head: Normocephalic.   Eyes: Pupils are equal, round, and reactive to light. Conjunctivae are normal.   Neck: Neck supple. No JVD present.   Cardiovascular: Normal rate, regular rhythm, normal heart sounds and intact distal pulses. Exam reveals no gallop and no friction rub.   No murmur heard.  Pulmonary/Chest: Effort normal and breath sounds normal. No respiratory distress. He has no wheezes. He has no rales. He exhibits no " tenderness.   Diminished breath sound bilateral, clear.   Abdominal: Soft. Bowel sounds are normal. He exhibits no distension. There is no tenderness. There is no rebound and no guarding.   Musculoskeletal: He exhibits no edema, tenderness or deformity.   Neurological: He is alert and oriented to person, place, and time. He displays normal reflexes. No cranial nerve deficit. He exhibits abnormal muscle tone. Coordination abnormal.   Skin: Skin is warm and dry. Capillary refill takes 2 to 3 seconds. No rash noted.   Psychiatric: He has a normal mood and affect. His behavior is normal.   Nursing note and vitals reviewed.        Discharge Disposition:  Home or Self Care    Discharge Medications:     Discharge Medications      New Medications      Instructions Start Date   guaiFENesin 600 MG 12 hr tablet  Commonly known as:  MUCINEX   600 mg, Oral, Every 12 Hours Scheduled      ipratropium-albuterol 0.5-2.5 mg/3 ml nebulizer  Commonly known as:  DUO-NEB  Replaces:  ipratropium-albuterol  MCG/ACT inhaler   3 mL, Nebulization, Every 6 Hours While Awake - RT      methylPREDNISolone 4 MG tablet  Commonly known as:  MEDROL (MELANIE)   Take as directed on package instructions.         Continue These Medications      Instructions Start Date   ALPRAZolam 1 MG tablet  Commonly known as:  XANAX   1 mg, Oral, 3 Times Daily PRN      budesonide-formoterol 160-4.5 MCG/ACT inhaler  Commonly known as:  SYMBICORT   2 puffs, Inhalation, Daily      IMODIUM PO   1 capsule, Oral, 3 Times Daily PRN         Stop These Medications    ipratropium-albuterol  MCG/ACT inhaler  Commonly known as:  COMBIVENT RESPIMAT  Replaced by:  ipratropium-albuterol 0.5-2.5 mg/3 ml nebulizer            Discharge Diet:   Diet Instructions     Advance Diet As Tolerated            Activity at Discharge:   Activity Instructions     Activity as Tolerated            Discharge Care Plan/Instructions:     Follow-up Appointments:   Future Appointments   Date Time  Provider Department Center   6/17/2020 10:30 AM Tylor Krause III, MD [facility] None   Follow-up PCP 1 week.    Donn Mas MD  06/08/20  12:34    Time: Greater than 30 minutes.

## 2020-06-09 ENCOUNTER — NURSE TRIAGE (OUTPATIENT)
Dept: CALL CENTER | Facility: HOSPITAL | Age: 70
End: 2020-06-09

## 2020-06-09 ENCOUNTER — READMISSION MANAGEMENT (OUTPATIENT)
Dept: CALL CENTER | Facility: HOSPITAL | Age: 70
End: 2020-06-09

## 2020-06-09 NOTE — PAYOR COMM NOTE
"[place] HOME 6-8-20  [id number]    Iain Matamoros (69 y.o. Male)     Date of Birth Social Security Number Address Home Phone MRN    [id number]  [id number] [facility] [address] [phone] [id number]    Nondenominational Marital Status          Restorationism        Admission Date Admission Type Admitting Provider Attending Provider Department, Room/Bed    6/3/20 Emergency Donn Mas MD  [facility] 3C, 385/1    Discharge Date Discharge Disposition Discharge Destination        6/8/2020 Home or Self Care              Attending Provider:  (none)   Allergies:  Penicillins    Isolation:  None   Infection:  None   Code Status:  Prior    Ht:  175.3 cm (69\")   Wt:  60.9 kg (134 lb 3.2 oz)    Admission Cmt:  None   Principal Problem:  Acute on chronic respiratory failure with hypoxia and hypercapnia (CMS/HCC) [J96.21,J96.22]                 Active Insurance as of 6/3/2020     Primary Coverage     Payor Plan Insurance Group Employer/Plan Group    HUMANA MEDICARE REPLACEMENT HUMANA MEDICARE REPLACEMENT [id number]     Payor Plan Address Payor Plan Phone Number Payor Plan Fax Number Effective Dates    [address] [address] [phone]  1/1/2018 - None Entered    [facility] [id number]       Subscriber Name Subscriber Birth Date Member ID       IAIN MATAMOROS 1950 [id number]                 Emergency Contacts      (Rel.) Home Phone Work Phone Mobile Phone    Ariana Matamoros (Spouse) [phone] -- [phone]               Discharge Summary      Donn Mas MD at 06/08/20 [facility]              [facility] ER Medicine Services  DISCHARGE SUMMARY       Date of Admission: 6/3/2020  Date of Discharge:  6/8/2020  Primary Care Physician: Darvin Badillo MD    Presenting Problem/History of Present Illness:  Acute hypoxemic respiratory failure (CMS/HCC) [J96.01]     Final Discharge Diagnoses:  Active Hospital Problems    Diagnosis   • **Acute on chronic respiratory failure " with hypoxia and hypercapnia (CMS/HCC)   • CHF (congestive heart failure) (CMS/HCC)   • Pneumonia   • Cirrhosis (CMS/HCC)   • Tobacco abuse   • COPD (chronic obstructive pulmonary disease) (CMS/HCC)         Pertinent Test Results:  IMPRESSION: CTA of the chest.  1. There is no evidence of embolic disease.  2. Bronchial wall thickening and bibasilar reticulonodular infiltrates  most likely representing infectious process.  3. Vascular calcifications noted in the coronary arteries  4. Small volume ascites..     IMPRESSION: Chest x-ray.  1. Bilateral interstitial and airspace opacities could be due to  atypical infection or interstitial/pulmonary edema on a background of  Emphysema.    Interpretation Summary echocardiogram    · Left ventricular systolic function is hyperdynamic (EF > 70).  · Right ventricular cavity is mildly dilated but appears to have normal systolic function.  · No significant (greater than mild) valvular pathology.        Chief Complaint on Day of Discharge: none    History of Present Illness on Day of Discharge:   Patient is a 69-year-old white male past medical history of bladder cancer as well as hepatocellular carcinoma and cirrhosis as well as COPD.  He presents to the emergency room with about a 2-week or more history of worsening shortness of breath cough congestion.  He denies fevers.  He states he has had a little bit of sputum production but not much no color to it.  Approximately 4 weeks ago patient states that he had severe swelling in his lower extremities for which she was prescribed diuretics.  He states the swelling went away and he stopped the diuretics however it sounds like shortly after that is when he started having the breathing problems.  He has been having evaluation in the emergency room ABG shows acute on chronic respiratory failure he is on home oxygen with a PCO2 of 94 PO2 of 61 on 4 L is pH interestingly enough is 7.31.  He is been placed on BiPAP and gotten his CO2  down to 85 he is tolerating it well sats are 94%.  Patient's x-rays show bilateral interstitial and airspace opacities possibly due to atypical infection or interstitial/pulmonary edema on the background of emphysema.  Endobronchial infectious process as well as emphysema and bronchitis.  He also has an elevated BNP.  His Covid swab was negative.  He is being admitted for further evaluation management of respiratory failure probably multifactorial.  I have a concern if he also has some underlying CHF or if his BNP is elevated simply because of right heart strain from COPD.    Hospital Course:  The patient is a 69 y.o. male who presented to [facility] with COPD exacerbation/respiratory failure with hypercapnia and hypoxia/CHF/cirrhosis of liver/chronic smoker.      Copd exacerbation acute on chronic respiratory failure with hypercapnia and hypoxia./Pneumonia.  Blood culture has been negative.  Status post antibiotic biotics for last 6 days.  Patient is currently off antibiotics.  Solu-Medrol x5 days.  Plan to start oral steroids today.   Pt to continue duo nebs, Symbicort,  guaifenesin.   Acute on chronic respiratory failure secondary to COPD.  Target volume ventilation needed to maintain adequate CO2 level, home BiPAP insufficient.  Patient will need to go home with a trilogy machine.  Patient continue home oxygen at 2 to 3 L.  Patient go home with nebulizer machine.     CHF.  Possible fluid overload.  Status post diuresis for last 6 days.  Echocardiogram-Left ventricular systolic function is hyperdynamic (EF > 70), right ventricular cavity is mildly dilated but appears to have normal systolic function, no significant (greater than mild) valvular pathology.     Cirrhosis of liver.  Liver function normal.  No signs of encephalopathy.     Tobacco abuse.  Nicotine patch.  Discussed patient cutting back and stop smoking.     Hyperglycemia secondary to steroid.  Hemoglobin A1 C 6.1.     Thrombopenia.   "Secondary to cirrhosis.  No sign of acute bleed.     Anxiety.  Xanax as needed.     Diarrhea.  Imodium PRN.     Nausea.  Zofran PRN.     Lovenox prophylaxis.     Nutrition.  Regular cardiac diet with dietary boost supplement.     Respiratory culture-Streptococcus, Beta Hemolytic.  Legionella antigen-negative.  MRSA screening-negative.  Strep pneumo antigen-negative.  Blood culture-no growth in 4 days.     Vital signs stable, afebrile.  Will discharge home with family.  Patient refused home health.  Patient refused to stop smoking.  Patient stated he has nicotine patch at home if he needs it.  Patient go home with a trilogy.  Patient home with nebulizers.  Follow with primary care doctor 1 week.    Condition on Discharge:  stable    Physical Exam on Discharge:  /65 (BP Location: Right arm, Patient Position: Sitting)   Pulse 86   Temp 97.4 °F (36.3 °C) (Oral)   Resp 16   Ht 175.3 cm (69\")   Wt 60.9 kg (134 lb 3.2 oz)   SpO2 (!) 88%   BMI 19.82 kg/m²    Physical Exam   Constitutional: He is oriented to person, place, and time. He appears well-developed.   Cachectic.   HENT:   Head: Normocephalic.   Eyes: Pupils are equal, round, and reactive to light. Conjunctivae are normal.   Neck: Neck supple. No JVD present.   Cardiovascular: Normal rate, regular rhythm, normal heart sounds and intact distal pulses. Exam reveals no gallop and no friction rub.   No murmur heard.  Pulmonary/Chest: Effort normal and breath sounds normal. No respiratory distress. He has no wheezes. He has no rales. He exhibits no tenderness.   Diminished breath sound bilateral, clear.   Abdominal: Soft. Bowel sounds are normal. He exhibits no distension. There is no tenderness. There is no rebound and no guarding.   Musculoskeletal: He exhibits no edema, tenderness or deformity.   Neurological: He is alert and oriented to person, place, and time. He displays normal reflexes. No cranial nerve deficit. He exhibits abnormal muscle tone. " Coordination abnormal.   Skin: Skin is warm and dry. Capillary refill takes 2 to 3 seconds. No rash noted.   Psychiatric: He has a normal mood and affect. His behavior is normal.   Nursing note and vitals reviewed.        Discharge Disposition:  Home or Self Care    Discharge Medications:     Discharge Medications      New Medications      Instructions Start Date   guaiFENesin 600 MG 12 hr tablet  Commonly known as:  MUCINEX   600 mg, Oral, Every 12 Hours Scheduled      ipratropium-albuterol 0.5-2.5 mg/3 ml nebulizer  Commonly known as:  DUO-NEB  Replaces:  ipratropium-albuterol  MCG/ACT inhaler   3 mL, Nebulization, Every 6 Hours While Awake - RT      methylPREDNISolone 4 MG tablet  Commonly known as:  MEDROL (MELANIE)   Take as directed on package instructions.         Continue These Medications      Instructions Start Date   ALPRAZolam 1 MG tablet  Commonly known as:  XANAX   1 mg, Oral, 3 Times Daily PRN      budesonide-formoterol 160-4.5 MCG/ACT inhaler  Commonly known as:  SYMBICORT   2 puffs, Inhalation, Daily      IMODIUM PO   1 capsule, Oral, 3 Times Daily PRN         Stop These Medications    ipratropium-albuterol  MCG/ACT inhaler  Commonly known as:  COMBIVENT RESPIMAT  Replaced by:  ipratropium-albuterol 0.5-2.5 mg/3 ml nebulizer            Discharge Diet:   Diet Instructions     Advance Diet As Tolerated            Activity at Discharge:   Activity Instructions     Activity as Tolerated            Discharge Care Plan/Instructions:     Follow-up Appointments:   Future Appointments   Date Time Provider Department Center   6/17/2020 10:30 AM Tylor Krause III, MD [facility] PAD None   Follow-up PCP 1 week.    Donn Mas MD  06/08/20  12:34    Time: Greater than 30 minutes.        Electronically signed by Donn Mas MD at 06/08/20 [id number]

## 2020-06-09 NOTE — OUTREACH NOTE
Prep Survey      Responses   [facility] facility patient discharged from?  [place]   Is LACE score < 7 ?  No   Eligibility  Readm Mgmt   Discharge diagnosis  A/C resp. failure with hypoxia and hypercapnia, CHF, pneumonia, CIrrhosis, COPD   COVID-19 Test Status  Negative   Does the patient have one of the following disease processes/diagnoses(primary or secondary)?  COPD/Pneumonia [And CHF/Cirrhosis]   Does the patient have Home health ordered?  No   Is there a DME ordered?  Yes   What DME was ordered?  [facility] for home Nebulizer   Comments regarding appointments  Pt to schedule F/U with PCP   Medication alerts for this patient  see AVS   Prep survey completed?  Yes          Ariana Adler RN

## 2020-06-09 NOTE — TELEPHONE ENCOUNTER
"He as prescribed a steroid hernan and said Dr. Rosado said he could take them all 6 at once today dose, he has trouble sleeping, told her to call pharmacy an dask if ok,     Reason for Disposition  • Caller has medication question about med not prescribed by PCP and triager unable to answer question (e.g., compatibility with other med, storage)    Additional Information  • Negative: Drug overdose and nurse unable to answer question  • Negative: Caller requesting information not related to medicine  • Negative: Caller requesting a prescription for Strep throat and has a positive culture result  • Negative: Rash while taking a medication or within 3 days of stopping it  • Negative: Immunization reaction suspected  • Negative: [1] Asthma and [2] having symptoms of asthma (cough, wheezing, etc)  • Negative: MORE THAN A DOUBLE DOSE of a prescription or over-the-counter (OTC) drug  • Negative: [1] DOUBLE DOSE (an extra dose or lesser amount) of over-the-counter (OTC) drug AND [2] any symptoms (e.g., dizziness, nausea, pain, sleepiness)  • Negative: [1] DOUBLE DOSE (an extra dose or lesser amount) of prescription drug AND [2] any symptoms (e.g., dizziness, nausea, pain, sleepiness)  • Negative: Took another person's prescription drug  • Negative: [1] DOUBLE DOSE (an extra dose or lesser amount) of prescription drug AND [2] NO symptoms (Exception: a double dose of antibiotics)  • Negative: Diabetes drug error or overdose (e.g., insulin or extra dose)  • Negative: [1] Request for URGENT new prescription or refill of \"essential\" medication (i.e., likelihood of harm to patient if not taken) AND [2] triager unable to fill per unit policy  • Negative: [1] Prescription not at pharmacy AND [2] was prescribed today by PCP  • Negative: Pharmacy calling with prescription questions and triager unable to answer question  • Negative: Caller has URGENT medication question about med that PCP prescribed and triager unable to answer question  • " "Negative: Caller has NON-URGENT medication question about med that PCP prescribed and triager unable to answer question  • Negative: Caller requesting a NON-URGENT new prescription or refill and triager unable to refill per unit policy    Answer Assessment - Initial Assessment Questions  1. SYMPTOMS: \"Do you have any symptoms?\"      no  2. SEVERITY: If symptoms are present, ask \"Are they mild, moderate or severe?\"      no    Protocols used: MEDICATION QUESTION CALL-ADULT-      "

## 2020-06-11 ENCOUNTER — READMISSION MANAGEMENT (OUTPATIENT)
Dept: CALL CENTER | Facility: HOSPITAL | Age: 70
End: 2020-06-11

## 2020-06-11 NOTE — OUTREACH NOTE
COPD/PN Week 1 Survey      Responses   [facility] patient discharged from?  [place]   COVID-19 Test Status  Negative   Does the patient have one of the following disease processes/diagnoses(primary or secondary)?  COPD/Pneumonia   Is there a successful TCM telephone encounter documented?  No   Was the primary reason for admission:  Pneumonia   Week 1 attempt successful?  Yes   Call start time  0951   Call end time  0959   Discharge diagnosis  Pneumonia, CIrrhosis, COPD, CHF   Person spoke with today (if not patient) and relationship  Mrs. Mina-spouse   Meds reviewed with patient/caregiver?  Yes   Is the patient having any side effects they believe may be caused by any medication additions or changes?  No   Does the patient have all medications ordered at discharge?  Yes   Is the patient taking all medications as directed (includes completed medication regime)?  Yes   Comments regarding appointments  Wife may call and cancel appt with Dr. Krause because patient doesn't believe he doesn't want to take more radiation.   Does the patient have a primary care provider?   Yes   Does the patient have an appointment with their PCP or pulmonologist within 7 days of discharge?  Yes   Comments regarding PCP  6/15/20   Has the patient kept scheduled appointments due by today?  N/A   What DME was ordered?  [employer] for home Nebulizer   Has all DME been delivered?  Yes   Pulse Ox monitoring  Intermittent   Pulse Ox device source  Patient   Psychosocial issues?  No   Comments  Pt wears 2LPM O2 all the time and BiPAP at night   Did the patient receive a copy of their discharge instructions?  Yes   Nursing interventions  Reviewed instructions with patient   What is the patient's perception of their health status since discharge?  Improving   Nursing Interventions  Nurse provided patient education   Are the patient's immunizations up to date?   -- [Someone called and wanted to schedule appt for more vaccines.]    Nursing interventions  Advised patient to discuss with provider at next visit, Educated on importance of maintaining up to date immunizations as advised by provider   If the patient is a current smoker, are they able to teach back resources for cessation?  -- [Smoker. He's tried patches. Wife reports he takes O2 off while smoking. ]   Is the patient/caregiver able to teach back the hierarchy of who to call/visit for symptoms/problems? PCP, Specialist, Home health nurse, Urgent Care, ED, 911  Yes   Is the patient/caregiver able to teach back signs and symptoms of worsening condition:  Fever/chills, Shortness of breath, Chest pain   Is the patient/caregiver able to teach back importance of completing antibiotic course of treatment?  -- [Not sent home with ABX]   Week 1 call completed?  Yes          Bebe Abraham RN

## 2020-06-16 ENCOUNTER — HOSPITAL ENCOUNTER (OUTPATIENT)
Dept: RADIATION ONCOLOGY | Facility: HOSPITAL | Age: 70
Setting detail: RADIATION/ONCOLOGY SERIES
End: 2020-06-16

## 2020-06-17 ENCOUNTER — OFFICE VISIT (OUTPATIENT)
Dept: RADIATION ONCOLOGY | Facility: HOSPITAL | Age: 70
End: 2020-06-17

## 2020-06-17 VITALS
SYSTOLIC BLOOD PRESSURE: 107 MMHG | HEIGHT: 69 IN | HEART RATE: 94 BPM | BODY MASS INDEX: 21.48 KG/M2 | WEIGHT: 145 LBS | DIASTOLIC BLOOD PRESSURE: 67 MMHG

## 2020-06-17 DIAGNOSIS — C22.0 HEPATOCELLULAR CARCINOMA (HCC): ICD-10-CM

## 2020-06-17 DIAGNOSIS — K40.90 UNILATERAL INGUINAL HERNIA WITHOUT OBSTRUCTION OR GANGRENE, RECURRENCE NOT SPECIFIED: ICD-10-CM

## 2020-06-17 DIAGNOSIS — F17.200 CURRENT EVERY DAY SMOKER: ICD-10-CM

## 2020-06-17 DIAGNOSIS — C67.8 MALIGNANT NEOPLASM OF OVERLAPPING SITES OF BLADDER (HCC): Primary | ICD-10-CM

## 2020-06-17 DIAGNOSIS — R10.30 LOWER ABDOMINAL PAIN: ICD-10-CM

## 2020-06-17 DIAGNOSIS — Z92.3 HISTORY OF RADIATION THERAPY: ICD-10-CM

## 2020-06-17 PROCEDURE — G0463 HOSPITAL OUTPT CLINIC VISIT: HCPCS | Performed by: RADIOLOGY

## 2020-06-17 RX ORDER — FUROSEMIDE 20 MG/1
1 TABLET ORAL DAILY
COMMUNITY
Start: 2020-06-15

## 2020-06-17 RX ORDER — POTASSIUM CHLORIDE 750 MG/1
1 TABLET, FILM COATED, EXTENDED RELEASE ORAL DAILY
COMMUNITY

## 2020-06-18 ENCOUNTER — READMISSION MANAGEMENT (OUTPATIENT)
Dept: CALL CENTER | Facility: HOSPITAL | Age: 70
End: 2020-06-18

## 2020-06-18 NOTE — OUTREACH NOTE
COPD/PN Week 2 Survey      Responses   [facility] patient discharged from?  [place]   COVID-19 Test Status  Negative   Does the patient have one of the following disease processes/diagnoses(primary or secondary)?  COPD/Pneumonia   Was the primary reason for admission:  Pneumonia   Week 2 attempt successful?  No   Unsuccessful attempts  Attempt 1          Cuca Streeter RN

## 2020-06-22 ENCOUNTER — READMISSION MANAGEMENT (OUTPATIENT)
Dept: CALL CENTER | Facility: HOSPITAL | Age: 70
End: 2020-06-22

## 2020-06-22 NOTE — OUTREACH NOTE
"COPD/PN Week 2 Survey      Responses   [facility] patient discharged from?  [place]   COVID-19 Test Status  Negative   Does the patient have one of the following disease processes/diagnoses(primary or secondary)?  COPD/Pneumonia   Was the primary reason for admission:  Pneumonia   Week 2 attempt successful?  Yes   Call start time  1603   Rescheduled  Rescheduled-pt requested [spouse state \"he is napping and you will have to talk to him\".]   Call end time  1604          Gisele Salazar RN  "

## 2020-06-24 ENCOUNTER — READMISSION MANAGEMENT (OUTPATIENT)
Dept: CALL CENTER | Facility: HOSPITAL | Age: 70
End: 2020-06-24

## 2020-06-24 NOTE — OUTREACH NOTE
COPD/PN Week 2 Survey      Responses   [facility] patient discharged from?  [place]   COVID-19 Test Status  Negative   Does the patient have one of the following disease processes/diagnoses(primary or secondary)?  COPD/Pneumonia   Was the primary reason for admission:  Pneumonia   Week 2 attempt successful?  Yes   Call start time  1608   Call end time  1610   Discharge diagnosis  Pneumonia, CIrrhosis, COPD, CHF   Meds reviewed with patient/caregiver?  Yes   Is the patient having any side effects they believe may be caused by any medication additions or changes?  No   Does the patient have all medications ordered at discharge?  Yes   Is the patient taking all medications as directed (includes completed medication regime)?  Yes   Does the patient have a primary care provider?   Yes   Does the patient have an appointment with their PCP or pulmonologist within 7 days of discharge?  Yes   Has the patient kept scheduled appointments due by today?  Yes   What DME was ordered?  [facility] for home Nebulizer   Has all DME been delivered?  Yes   Pulse Ox monitoring  Intermittent   Pulse Ox device source  Patient   Comments  Pt wears 2LPM O2 all the time and BiPAP at night   Did the patient receive a copy of their discharge instructions?  Yes   What is the patient's perception of their health status since discharge?  Improving   Nursing Interventions  Nurse provided patient education   If the patient is a current smoker, are they able to teach back resources for cessation?  [id number]   Is the patient/caregiver able to teach back the hierarchy of who to call/visit for symptoms/problems? PCP, Specialist, Home health nurse, Urgent Care, ED, 911  Yes   Additional teach back comments  Encouraged to stop smoking, says he is sleeping well, some SOA, afebrile, eating well.   Is the patient/caregiver able to teach back signs and symptoms of worsening condition:  Fever/chills, Shortness of breath, Chest pain   Is the  patient/caregiver able to teach back importance of completing antibiotic course of treatment?  Yes   Week 2 call completed?  Yes   Wrap up additional comments  Says he is doing a bit better.  Encouraged ambulation.          Laura France RN

## 2020-06-30 ENCOUNTER — READMISSION MANAGEMENT (OUTPATIENT)
Dept: CALL CENTER | Facility: HOSPITAL | Age: 70
End: 2020-06-30

## 2020-06-30 NOTE — OUTREACH NOTE
COPD/PN Week 3 Survey      Responses   [facility] patient discharged from?  [place]   COVID-19 Test Status  Negative   Does the patient have one of the following disease processes/diagnoses(primary or secondary)?  COPD/Pneumonia   Was the primary reason for admission:  Pneumonia   Week 3 attempt successful?  No   Unsuccessful attempts  Attempt 1          Gisele Salazar RN

## 2020-07-01 ENCOUNTER — READMISSION MANAGEMENT (OUTPATIENT)
Dept: CALL CENTER | Facility: HOSPITAL | Age: 70
End: 2020-07-01

## 2020-07-01 NOTE — OUTREACH NOTE
COPD/PN Week 3 Survey      Responses   [facility] patient discharged from?  [place]   COVID-19 Test Status  Negative   Does the patient have one of the following disease processes/diagnoses(primary or secondary)?  COPD/Pneumonia   Was the primary reason for admission:  Pneumonia   Week 3 attempt successful?  No   Unsuccessful attempts  Attempt 2          Sanna Navarrete RN

## 2020-07-13 ENCOUNTER — APPOINTMENT (OUTPATIENT)
Dept: CT IMAGING | Facility: HOSPITAL | Age: 70
End: 2020-07-13

## 2023-04-03 NOTE — PROGRESS NOTES
[facility]  MD Tere Gross BSN, PA-C  ____________________________________________________________  [facility]  Department of Radiation Oncology  [address] [place] [id number]  Office:  [phone]  Fax: [phone]    DATE:   11/22/2019    PATIENT:   Elder Mina   1950                                 MEDICAL RECORD #:  [id number]    Reason for Follow up Visit: Elder Mina is a very pleasant 69 y.o. 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  We referred Mr. Mina to medical oncology for evaluation regarding possible bladder preservation definitive chemoradiation and he has been seen by Logan Resendiz MD, Tecentriq  was started today. It was recommended further that he be seen by his urologist for maximum tumor resection/TURBT prior to CT simulation if he chose definitive intent radiation radiation therapy options, this was completed on 11/15/19 by Dr. Faisal kulkarni. He follows  at [facility] in [place], [place].     HISTORY OF PRESENT ILLNESS  08/13/2018 -  presented to [facility] ER for evaluation of 3-4 days of hematuria. He endorses fecal urgency and diarrhea as his only associated symptoms. He states the hematuria and diarrhea began spontaneously. CT Abdomen/Pelvis for evaluation revealing retroperitoneal air, free fluid in pelvis, and ruptured viscus. He was transferred to [facility] for further evaluation.     08/13/2018 - CT Abdomen/Pelvis ([facility] interpretation from outside facility):  • Multiple foci of extraluminal gas posterior to the cecum suspected to represent bowel perforation most likely involving the cecum. Medially the appendix appears unremarkable and is thought unlikely to represent the source. No definite mass appreciated within the limitations of unenhanced CT  technique; endoscopic follow-up recommended.  • Abdominopelvic free fluid with a large centrally calcified structure in the pouch of Cyrus. This might potential be related to the above-described bowel perforation, however this is suspected to more likely be chronic and of indeterminate etiology. A clear urinary or biliary source is not identified.  • Borderline cirrhotic hepatic configuration. Probable mild varices adjacent to the distal esophagus and proximal stomach. Nonspecific fat stranding in the upper abdominal mesentery and retroperitoneum. Mild gastrohepatic adenopathy, uncertain etiology, possibly reactive; neoplastic etiology cannot be excluded. Correlation with prior imaging recommended.  • Small hypoattenuating area in the posterior RIGHT hepatic lobe incompletely characterized ; given the borderline cirrhotic configuration and nonemergent follow-up hepatic protocol CT or MRI is recommended for further evaluation. Small renal lesions too small to characterize but statistically likely a cyst.  • Incidental and other findings as described above including right renal stone, small inguinal hernias, degenerative disc disease, atherosclerosis.  • Addendum: Given the presence of mild fat stranding in the upper abdomen in the region of the pancreas, the possibility of mild acute pancreatitis cannot be excluded and biochemical correlation is recommended. A tiny calcination in the region of pancreatic head may indicate sequela of chronic pancreatitis.    08/14/2018 - CT Abdomen/Pelvis:  • Similar in appearance to the prior study, there are findings suspicious for bowel perforation, with extraluminal gas seen adjacent to the cecum and terminal ileum. This was subsequently evaluated at the operating room.  • Small volume of free fluid in the abdomen and pelvis. There is also a rounded radiodensity free-floating within ascitic fluid in the pelvis, which is of on certain etiology.. At the time of dictation, this had  been removed at laparoscopy.  • There are two enhancing lesions arising from the urinary bladder wall, suspicious for bladder neoplasms. These were subsequently evaluated at cystoscopy.  • Morphologic features of cirrhosis. 2.1 cm lesion in the right lobe of the liver, not definitively characterized on this single phase study, but suspicious for hepatocellular carcinoma. Recommend further evaluation with dedicated liver protocol CT or MRI as well as correlation with serum alpha-fetoprotein levels.    08/14/2018 - Cystoscopy biopsy:  URINARY BLADDER, LEFT, TRANSURETHRAL RESECTION:   • NONINVASIVE HIGH GRADE PAPILLARY UROTHELIAL CARCINOMA; NO MUSCULARIS PROPRIA PRESENT.    URINARY BLADER, RIGHT WALL, TRANSURETHRAL RESECTION:  • NONINVASIVE HIGH GRADE PAPILLARY UROTHELIAL CARCINOMA WITH EXTENSIVE CAUTERY ARTIFACT; NO MUSCULARIS PROPRIA PRESENT.       08/15/2018 - Discharged home with follow up appointments scheduled.     09/14/2018 - MRI Abdomen with and without contrast:  • There are 3 liver lesions, as described above. The largest lesion is suspicious for hepatocellular carcinoma. The 2 smaller lesions may be small atypical hepatocellular carcinomas or dysplastic nodules.  • Small cyst in the upper pole left kidney.  • Small amount of ascites. Varices along the proximal stomach.    09/28/2018 - TURBT - [facility]:  Urinary bladder, lateral tumor, transurethral resection:  • Noninvasive, high-grade papillary urothelial carcinoma  • Acute and chronic inflammation  • Muscularis propria is present and negative  Urinary bladder, posterior tumor, transurethral resection:  • Noninvasive, high-grade papillary urothelial carcinoma  • Chronic inflammation  • Muscularis propria is not identified  Urinary bladder, right lateral tumor, transurethral resection:   • Severe cautery artifact  • Intact urothelium is not visualized     01/21/2019 - TURBT - [facility]:  Bladder, posterior tumor #1, transurethral resection:  • Noninvasive high-grade  Previous Accession (Optional): [id number] papillary urothelial carcinoma, pTa.  • Detrusor muscle is not present for evaluation  Bladder, area of right ureteral orifice, biopsy:  • Urothelial carcinoma in situ, pTis.  Bladder, posterior tumor #2, transurethral resection:  • Noninvasive high-grade papillary urothelial carcinoma, pTa.  • Detrusor muscle is not present for evaluation    04/18/2019 - CT Abdomen/Pelvis ([facility] interpretation from outside facility)  • Redemonstration of a treated lesion in segment VII of the right  hepatic lobe, without evidence of arterial enhancement or washout,  compatible with a LR-TR nonviable.  • No new liver lesion is identified. Portal vein is patent, without  evidence of thrombus.  • Cirrhosis, with evidence of portal hypertension    05/20/2019 - TURBT - [facility]:  Urinary bladder, left wall, biopsy:  • Invasive urothelial carcinoma with nested features invading the lamina propria.  • No muscularis propria smooth muscle present for evaluation.  Urinary bladder, posterior wall, biopsy:  • Invasive urothelial carcinoma with nested features invading the lamina propria.  • No muscularis propria smooth muscle present for evaluation.  Urinary bladder, right wall, biopsy:   • Invasive papillary urothelial carcinoma, high-grade.   Comment: The tumor invades the muscularis propria.  Focally, invasive carcinoma is seen involving thin smooth muscle bundles.  It is difficult to distinguish whether these muscle bundles represent muscularis propria that has been partially destroyed by the invasive cancer or muscularis mucosa from the lamina propria.  Additional tissue sampling is recommended.  An extra departmental consultation was obtained from Dr. Jhonatan Parson at [facility].  His diagnostic impressions are reflected in the above diagnoses.  Please refer to the complete pathology report from Dr. Parson which is appended.    07/24/2019 - CT Chest/Abdomen/Pelvis:  • Post procedural changes related to prior TACE in segment  "seven of  the right hepatic lobe. There is a nodular focus of arterial enhancement at the posterior margin of the treated lesion, compatible  with LR-TR viable. Please note, on the pretreatment, outside MRI from 9/14/2018, this treated lesion had rim-like arterial phase hyperenhancement, and would have been best characterized as LI-RADS - M.  • 1.2 cm soft tissue nodule along the right posterior bladder wall, decreased in size since 8/14/2018, correlating with one of patient's known bladder neoplasms. A second previously seen lesion along the left posterior bladder wall is no longer clearly definable.  • No findings to suggest metastatic disease in the abdomen or pelvis.  • Small, nonspecific noncalcified nodule in the right upper lobe. Recommend attention on follow-up.  • Numerous additional findings as above.    07/24/2019 - TUBRT at [facility]:  URINARY BLADDER, RIGHT LATERAL WALL, TRANSURETHRAL RESECTION:  • INVASIVE HIGH GRADE UROTHELIAL CARCINOMA.  • TUMOR INVADES MUSCULARIS PROPRIA.  URINARY BLADDER, BASE, TRANSURETHRAL RESECTION:  • UROTHELIAL CARCINOMA IN SITU.  • MUSCULARIS PROPRIA IS PRESENT.    08/2019 -- Most recent hepatic chemoembolization    09/30/2019 - Appointment with  ([facility]):  • Today we discussed potential treatment options.   • Given that it sounds as if he is not felt to be a cystectomy candidate (which seems appropriate), we focused on conversation on concurrent chemo/radiation, radiation alone or less aggressive strategies.   • First we discussed concurrent chemo radiation. I think he may be able to tolerate 5FU/mitomycin with radiation. His counts are a little questionable given his cirrhosis but his overall liver function seems ok. If this is felt to be too aggressive, he could be considered for radiation alone. While not as likely to be curative, it may be a good \"in between\" option for him if he wishes to try to avoid excessive side effects. " Previous Accession (Optional): [id number]   • Finally, we discussed repeat TURBT on occasion without definitive therapy. While this is not felt to be potentially curative, it could potentially slow his time to progression/development of further symptoms.  • Mr. Mina is unsure of the route he wishes to take at this time, He is clear that he needs treatment closer to home so we will refer to oncology in [place].   • He will need a repeat TURBT prior to radiation should he choose this option.    09/30/2019 - Appointment with  (Radiation Oncology)  • Mr. Mina is a 69 year old male with multiple medical co-morbidities with newly diagnosed muscle invasive bladder cancer.   • We discussed the options for management of his bladder cancer including surgery, continued TURBTs, or radiation therapy with or without concurrent chemotherapy.  • Patient is likely a poor cystectomy candidate due to liver dysfunction in the setting of cirrhosis. We discussed that radiation therapy is a reasonable management option, however, continued discussion should be had about goals of care in setting of cirrhosis and other medical co-morbidities.   • Patient to meet Medical Oncology this afternoon to discuss concurrent chemotherapy candidacy.   • Patient wishes to receive treatment closer to home, so will place referral to Radiation Oncology in [place].   • Referral placed to Dr. Tylor Krause in [place], [place] for consultation and treatment planning.    10/01/2019 - CT Abdomen/Pelvis:  • No signs of residual disease, LR TR nonviable.  • No new lesions.  • Signs of portal hypertension as before.  (plans to re-image in 01/2020)    10/31/2019 CT chest- Staging  · No evidence of intrathoracic metastatic disease is identified.Moderate emphysema is present. There are a few focal areas of interstitial scarring in the lungs. There is a 3 mm noncalcified subpleural nodule in the right upper lobe which appears benign.  · Heavy coronary artery calcifications are present.  · Mild  "thickening of the wall of the distal esophagus which may be related to inflammation from reflux disease. Clinical correlation is recommended.  · Small 1 cm cyst at the upper pole of the left kidney.    10/31/2019 CT abdomen- Staging  · No evidence of intra-abdominal pelvic metastatic disease. There were 3 small liver masses seen on previous MRI, the to similar mass seen in segment 8 has regressed and appears calcified, compatible with the history of interventional chemoembolization. The other 2 liver lesions are not visualized.   · Poor distention of the bladder with mild bladder wall thickening and no discrete bladder mass.  · Mild sigmoid diverticulosis.  · Swirling of the central mesentery compatible with mild mesenteric volvulus, without complication.  · Heavy aortic atherosclerotic calcification and calcification within the proximal SMA, with no signs of mesenteric ischemia.    11/15/2019 SURGERY  TURBT Pathology  · Posterior wall bladder tumor, TUR:  High-grade urothelial carcinoma  · Right lateral wall bladder tumor, TUR:  High-grade urothelial carcinoma  · Dome bladder tumor, TUR:  Negative for tumor    History obtained from  PATIENT, FAMILY and CHART    PAST MEDICAL HISTORY   Past Medical History:   Diagnosis Date   • Anxiety    • Bladder cancer (CMS/HCC)    • Cataracts, bilateral    • Cirrhosis (CMS/HCC)    • COPD (chronic obstructive pulmonary disease) (CMS/HCC)    • Hematuria    • Liver cancer (CMS/HCC)    • Urinary retention       PAST SURGICAL HISTORY   Past Surgical History:   Procedure Laterality Date   • CYSTOSCOPY     • ENDOSCOPY N/A 9/20/2018    Procedure: ESOPHAGOGASTRODUODENOSCOPY WITH ANESTHESIA;  Surgeon: Sanford Barahona DO;  Location: [facility] ENDOSCOPY;  Service: Gastroenterology   • EXPLORATORY LAPAROTOMY      \"cyst\" removed   • EYE SURGERY Right     cataract   • LIVER SURGERY  12/06/2018    went up through groin to cancerous liver nodule and cut off its blood supply and injected chemo " into it   • TRANSURETHRAL RESECTION OF BLADDER TUMOR N/A 9/28/2018    Procedure: CYSTOSCOPY TRANSURETHRAL RESECTION OF BLADDER TUMOR;  Surgeon: Paul Giang MD;  Location:  [facility] OR;  Service: Urology   • TRANSURETHRAL RESECTION OF BLADDER TUMOR N/A 1/21/2019    Procedure: CYSTOSCOPY TRANSURETHRAL RESECTION OF BLADDER TUMOR;  Surgeon: Paul Giang MD;  Location:  [facility] OR;  Service: Urology   • TRANSURETHRAL RESECTION OF BLADDER TUMOR N/A 5/20/2019    Procedure: CYSTOSCOPY TRANSURETHRAL RESECTION OF BLADDER TUMOR;  Surgeon: Paul Giang MD;  Location:  [facility] OR;  Service: Urology   • TRANSURETHRAL RESECTION OF BLADDER TUMOR N/A 11/15/2019    Procedure: CYSTOSCOPY TRANSURETHRAL RESECTION OF BLADDER TUMOR;  Surgeon: Yordy France MD;  Location:  [facility] OR;  Service: Urology      FAMILY HISTORY  family history includes Cirrhosis in his mother; Colon cancer in his father; Emphysema in his brother; Liver cancer in his mother; Liver disease in his brother and brother; No Known Problems in his maternal grandfather, maternal grandmother, paternal grandfather, and paternal grandmother.    SOCIAL HISTORY   Social History     Tobacco Use   • Smoking status: Current Every Day Smoker     Packs/day: 1.00     Years: 40.00     Pack years: 40.00   • Smokeless tobacco: Never Used   Substance Use Topics   • Alcohol use: No     Frequency: Never   • Drug use: No      ALLERGIES   Penicillins     MEDICATIONS   Current Outpatient Medications   Medication Sig Dispense Refill   • ALPRAZolam (XANAX) 1 MG tablet Take 1 mg by mouth 2 (Two) Times a Day As Needed for Anxiety.     • budesonide-formoterol (SYMBICORT) 160-4.5 MCG/ACT inhaler Inhale 2 puffs Daily.     • ipratropium-albuterol (COMBIVENT RESPIMAT)  MCG/ACT inhaler Inhale 1 puff 4 (Four) Times a Day As Needed for Wheezing.     • O2 (OXYGEN) Inhale 2 L/min Take As Directed. nightly and when short of air     • oxybutynin (DITROPAN) 5 MG tablet Take 1  "tablet by mouth Every 8 (Eight) Hours As Needed (prn bladder spasms). 30 tablet 1   • phenazopyridine (PYRIDIUM) 100 MG tablet Take 1 tablet by mouth 3 (Three) Times a Day As Needed (urinary burning). 20 tablet 0   • traMADol (ULTRAM) 50 MG tablet Take 1 tablet by mouth Every 6 (Six) Hours As Needed for Moderate Pain  (POSTOP PAIN). 8 tablet 0     No current facility-administered medications for this visit.       The following portions of the patient's history were reviewed and updated as appropriate: allergies, current medications, past family history, past medical history, past social history, past surgical history and problem list.    REVIEW OF SYSTEMS   Review of Systems   Constitutional: Positive for fatigue. Negative for activity change, appetite change, chills, diaphoresis, fever and unexpected weight change.   HENT: Negative.    Eyes: Negative.    Respiratory: Positive for cough and shortness of breath. Negative for apnea, choking, chest tightness, wheezing and stridor.         Oxygen  COPD   Cardiovascular: Negative.    Gastrointestinal: Negative.    Endocrine: Negative.    Genitourinary: Positive for frequency and hematuria. Negative for decreased urine volume, discharge, dysuria, enuresis, flank pain, genital sores, penile pain, penile swelling, scrotal swelling, testicular pain and urgency.        Nocturia x 1  Blood is pinkish in urine   Musculoskeletal: Negative.    Skin: Negative.    Allergic/Immunologic: Negative.    Neurological: Negative.    Hematological: Negative.    Psychiatric/Behavioral: Negative.         Anxiety  Xanax     PHYSICAL EXAM   VITAL SIGNS:   Vitals:    11/22/19 1358   BP: 128/74   Weight: 75.3 kg (166 lb)   Height: 177.8 cm (70\")   PainSc: 0-No pain     General:  Alert and oriented, no acute distress, Vitals reviewed.  Head/Neck:  Normocephalic, without obvious abnormality, atraumatic.  The trachea is midline.   Nose/Sinuses:  Nares normal. Septum midline. Mucosa normal. No " drainage or sinus tenderness.  Mouth/Throat:  Mucosa moist, no lesions; pharynx without erythema, edema or exudate.  Neck:  supple, no mass, non-tender  Eyes: No gross abnormalities,  no scleral jaundice or erythema.    Ears: Ears appear intact with no abnormalities noted, hearing grossly normal  Chest:  Respiratory efforts are normal and unlabored, chest is clear to auscultation. Diminished breath sounds in lower lobes, wears oxygen per NC.  Cardiovascular: Regular rate and rhythm without murmurs, rubs, or gallops.   Abdomen:  Soft, non-tender, normal bowel sounds; no noted organomegaly or masses. no CVA tenderness   Extremities:  YO well, warm to touch, no evidence of cyanosis or edema.  Lymphatics:  No evidence of adenopathy in the cervical or supraclavicular areas.  Skin: No suspicious lesions or rashes of concern, skin color, texture, turgor are normal  Neurologic:  Alert and oriented, strength and sensation grossly normal  Psych: Mood and affect are appropriate for circumstance. Eye contact is appropriate.     Performance Status: ECOG (0) Fully active, able to carry on all predisease performance without restriction    Clinical Quality Measures  Elder Mina reports a pain score of 0/10. Given his pain assessment as noted, treatment options were discussed and the following options were decided upon as a follow-up plan to address the patient's pain: no pain, no plan given.  -Advanced Care Planning Care plan discussed, no care plan provided  -Body Mass Index Screening and Follow-Up Plan Patient's Body mass index is 23.89 kg/m². BMI is within normal parameters. No follow-up required..  -Tobacco Use: Screening and Cessation Intervention Social History    Tobacco Use      Smoking status: Current Every Day Smoker        Packs/day: 1.00        Years: 40.00        Pack years: 40      Smokeless tobacco: Never Used   Smoking cessation information given in after visit summary.    ASSESSMENT AND PLAN   1. Malignant  neoplasm of overlapping sites of bladder (CMS/HCC)    2. On antineoplastic chemotherapy    3. Current every day smoker    4. Encounter for consultation      RECOMMENDATIONS:   Elder Mina is a very pleasant 69 y.o. 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 He is now status post maximal resection TURBT on 11-15-19  which revealed High-grade urothelial carcinoma in the posterior and right lateral wall of the bladder.  He started Tecentriq today under Dr. Resendiz's care.  He now returns to our clinic for consideration of proceeding with RT planning and delivery.     We have discussed the indications and rationale of radiation therapy according to the NCCN Guidelines. I have extensively reviewed the risks, benefits and alternatives of therapy and progression of disease in spite of therapy with either local or systemic failure.  I have seen, examined and reviewed this patient's medication list, appropriate labs and imaging studies, reviewed relevant medical records and other physicians notes and discussed the goals/plans of care with the patient and family and answered all questions.  I agree with previously mentioned recommendations.  In the absence of definitive surgical options, definitive intent concomitant chemotherapy and radiation therapy versus radiation therapy alone can be attempted if patient wishes to undergo treatment at this time.  I explained the intent, benefits, course, precautions, and side effects (acute skin reactions, irritative urinary symptoms and possible urinary obstruction, altered bowel movement patterns, fistula formation, scar tissue formation, and others) of pelvic radiation therapy.    Systemic therapy was initiated today and he is here today for CT simulation to begin the treatment planning to the pelvis, likely 6480 cGy over 36 daily treatments, final plan to be  determined.The patient verbalizes understanding of this discussion and voices no further questions.  Patient will be CT simulated today in anticipation of starting his treatment in the next few days.    Elder Mina is a current cigarettes user.  He currently smokes 1 pack of cigarettes per day for a duration of 40 years. I have educated him on the risk of diseases from using tobacco products such as cancer, COPD and heart diease. I advised him to quit and he is not willing to quit. I spent >10 minutes counseling the patient.    Thank you for allowing me to assist in his care.    Todays appointment time was spent in counseling, coordination of care and surveillance related to patients diagnosis as well as radiation therapy possible and probable after effects.  I saw this patient in follow-up while covering for Dr. Tylor Krause, radiation oncologist.  Magno Perkins MD  11/22/2019

## (undated) DEVICE — PK CYSTO 30

## (undated) DEVICE — SYR CATH/TIP 50ML 2OZ STRL 1P/U

## (undated) DEVICE — GLV SURG BIOGEL M LTX PF 7 1/2

## (undated) DEVICE — EVAC BLDR UROVAC W ADAPT

## (undated) DEVICE — PAD GRND REM POLYHESIVE A/ DISP

## (undated) DEVICE — PK TURNOVER CYSTO RM

## (undated) DEVICE — CLTH CLENS READYCLEANSE PERI CARE PK/5

## (undated) DEVICE — ELECTRD LP CUT 24/26F YEL

## (undated) DEVICE — FRCP BX RADJAW4 NDL 2.8 240 STD OG

## (undated) DEVICE — CUFF,BP,DISP,1 TUBE,ADULT,HP: Brand: MEDLINE

## (undated) DEVICE — ENDOGATOR AUXILIARY WATER JET CONNECTOR: Brand: ENDOGATOR

## (undated) DEVICE — SENSR O2 OXIMAX FNGR A/ 18IN NONSTR

## (undated) DEVICE — THE CHANNEL CLEANING BRUSH IS A NYLON FLEXI BRUSH ATTACHED TO A FLEXIBLE PLASTIC SHEATH DESIGNED TO SAFELY REMOVE DEBRIS FROM FLEXIBLE ENDOSCOPES.

## (undated) DEVICE — CONMED SCOPE SAVER BITE BLOCK, 20X27 MM: Brand: SCOPE SAVER

## (undated) DEVICE — TBG SMPL FLTR LINE NASL 02/C02 A/ BX/100

## (undated) DEVICE — Device: Brand: DEFENDO AIR/WATER/SUCTION AND BIOPSY VALVE